# Patient Record
Sex: MALE | Race: WHITE
[De-identification: names, ages, dates, MRNs, and addresses within clinical notes are randomized per-mention and may not be internally consistent; named-entity substitution may affect disease eponyms.]

---

## 2017-01-13 RX ORDER — TEMAZEPAM 30 MG/1
CAPSULE ORAL
Qty: 30 CAPSULE | Refills: 0 | Status: SHIPPED | OUTPATIENT
Start: 2017-01-13 | End: 2017-02-10

## 2017-01-30 ENCOUNTER — PRIOR ORIGINAL RECORDS (OUTPATIENT)
Dept: OTHER | Age: 67
End: 2017-01-30

## 2017-01-31 RX ORDER — GABAPENTIN 100 MG/1
200 CAPSULE ORAL 2 TIMES DAILY
Qty: 120 CAPSULE | Refills: 5 | Status: SHIPPED | OUTPATIENT
Start: 2017-01-31 | End: 2017-08-02

## 2017-02-11 ENCOUNTER — OFFICE VISIT (OUTPATIENT)
Dept: INTERNAL MEDICINE CLINIC | Facility: CLINIC | Age: 67
End: 2017-02-11

## 2017-02-11 VITALS
BODY MASS INDEX: 26.04 KG/M2 | SYSTOLIC BLOOD PRESSURE: 132 MMHG | WEIGHT: 186 LBS | HEART RATE: 56 BPM | DIASTOLIC BLOOD PRESSURE: 76 MMHG | HEIGHT: 71 IN

## 2017-02-11 DIAGNOSIS — E78.00 HYPERCHOLESTEROLEMIA: ICD-10-CM

## 2017-02-11 DIAGNOSIS — I25.10 ASHD (ARTERIOSCLEROTIC HEART DISEASE): Primary | ICD-10-CM

## 2017-02-11 DIAGNOSIS — Z86.010 HISTORY OF COLON POLYPS: ICD-10-CM

## 2017-02-11 DIAGNOSIS — Z13.6 SCREENING FOR ABDOMINAL AORTIC ANEURYSM: ICD-10-CM

## 2017-02-11 DIAGNOSIS — I10 ESSENTIAL HYPERTENSION: ICD-10-CM

## 2017-02-11 DIAGNOSIS — M48.061 LUMBAR SPINAL STENOSIS: ICD-10-CM

## 2017-02-11 PROCEDURE — 99212 OFFICE O/P EST SF 10 MIN: CPT | Performed by: INTERNAL MEDICINE

## 2017-02-11 PROCEDURE — 99213 OFFICE O/P EST LOW 20 MIN: CPT | Performed by: INTERNAL MEDICINE

## 2017-02-11 NOTE — PROGRESS NOTES
Varun Gomez is a 69year old male. Patient presents with:  Low Back Pain: Gabapentin has helped a little bit, but hasn't cured it  HTN: Follow up  Leg Pain: Bump on left leg    HPI:   Mr. Nancy West presents this morning for follow-up.   Labs or daily.   Disp:  Rfl:    Atorvastatin Calcium (LIPITOR) 80 MG Oral Tab Take 80 mg by mouth daily.    Disp:  Rfl:      No Known Allergies   Past Medical History   Diagnosis Date   • ASHD (arteriosclerotic heart disease) 2013     Acute inferolateral MI s/p christal MD  2/11/2017  10:07 AM

## 2017-02-11 NOTE — PATIENT INSTRUCTIONS
Please continue your current medications. Please schedule an ultrasound to check for an abdominal aortic aneurysm. Please contact GI as you are due for repeat colonoscopy. Return visit in 6 months.

## 2017-02-14 ENCOUNTER — PATIENT MESSAGE (OUTPATIENT)
Dept: INTERNAL MEDICINE CLINIC | Facility: CLINIC | Age: 67
End: 2017-02-14

## 2017-02-15 RX ORDER — TEMAZEPAM 30 MG/1
CAPSULE ORAL
Qty: 30 CAPSULE | Refills: 0 | OUTPATIENT
Start: 2017-02-15 | End: 2017-03-15

## 2017-02-15 NOTE — TELEPHONE ENCOUNTER
From: Erick Ocasio  To: Gail Rendon MD  Sent: 2/14/2017 1:15 PM CST  Subject: Prescription Question    Could someone from this office get to the bottom of why no one can call in a script for my 's Temazapam!    He has one left for yovanny

## 2017-02-25 ENCOUNTER — HOSPITAL ENCOUNTER (OUTPATIENT)
Dept: ULTRASOUND IMAGING | Facility: HOSPITAL | Age: 67
Discharge: HOME | End: 2017-02-25
Attending: INTERNAL MEDICINE
Payer: COMMERCIAL

## 2017-02-25 DIAGNOSIS — Z13.6 SCREENING FOR ABDOMINAL AORTIC ANEURYSM: ICD-10-CM

## 2017-02-25 PROCEDURE — 76706 US ABDL AORTA SCREEN AAA: CPT

## 2017-03-15 RX ORDER — TEMAZEPAM 30 MG/1
CAPSULE ORAL
Qty: 30 CAPSULE | Refills: 0 | Status: SHIPPED | OUTPATIENT
Start: 2017-03-15 | End: 2017-04-08

## 2017-03-17 ENCOUNTER — PATIENT MESSAGE (OUTPATIENT)
Dept: INTERNAL MEDICINE CLINIC | Facility: CLINIC | Age: 67
End: 2017-03-17

## 2017-03-18 NOTE — TELEPHONE ENCOUNTER
Patient called to check on medication.    Temazepam called into the [facility] as ordered on 3/15/17 to [facility]

## 2017-03-20 RX ORDER — TEMAZEPAM 30 MG/1
CAPSULE ORAL
Qty: 30 CAPSULE | Refills: 0 | Status: CANCELLED | OUTPATIENT
Start: 2017-03-20

## 2017-03-20 NOTE — TELEPHONE ENCOUNTER
From: Alisa Ocasio  To: Wesley Tam MD  Sent: 3/15/2017 7:30 AM CDT  Subject: Medication Renewal Request    Original authorizing provider: MD Natalio Jacinto would like a refill of the following medications:  TEMAZEPAM

## 2017-03-22 NOTE — TELEPHONE ENCOUNTER
From: Erick Ocasio  To: Gail Rendon MD  Sent: 3/17/2017 6:57 PM CDT  Subject: Prescription Question    I am trying to get my 's Temazapam refilled. The pharmacy had requested it, I called on Thursday and now I am writing again.  He has o

## 2017-04-09 RX ORDER — TEMAZEPAM 30 MG/1
CAPSULE ORAL
Qty: 30 CAPSULE | Refills: 0 | Status: SHIPPED | OUTPATIENT
Start: 2017-04-09 | End: 2017-05-15

## 2017-05-01 ENCOUNTER — PATIENT MESSAGE (OUTPATIENT)
Dept: INTERNAL MEDICINE CLINIC | Facility: CLINIC | Age: 67
End: 2017-05-01

## 2017-05-03 ENCOUNTER — TELEPHONE (OUTPATIENT)
Dept: INTERNAL MEDICINE CLINIC | Facility: CLINIC | Age: 67
End: 2017-05-03

## 2017-05-03 NOTE — TELEPHONE ENCOUNTER
[facility]    Please transfer x [phone] Carlos Holland RN) until 4pm today or [phone] anytime. Thank you. Pt needs further triage. Noted copied from [address] below.     From: Maria L Ocasio  To: Andreas Goyal MD  Sent: 5/1/2017 9:23 AM CDT  Subject: Jaziel Villaseñor

## 2017-05-03 NOTE — TELEPHONE ENCOUNTER
Please see Acute telephone encounter 5/3/17. LMTCB for further triage. Message also sent via [employer] to call the office to speak with a nurse.         From: Cathy Ocasio  To: Tj Trivedi MD  Sent: 5/1/2017 9:23 AM CDT  Subject: Non-Urgent

## 2017-05-04 NOTE — TELEPHONE ENCOUNTER
Pt called, message per Dr given.   Verbalized understanding and compliance  Has appt pending 5/8/17 w/ Taylor Franco

## 2017-05-08 ENCOUNTER — OFFICE VISIT (OUTPATIENT)
Dept: SURGERY | Facility: CLINIC | Age: 67
End: 2017-05-08

## 2017-05-08 VITALS
RESPIRATION RATE: 20 BRPM | HEIGHT: 71 IN | BODY MASS INDEX: 26.18 KG/M2 | DIASTOLIC BLOOD PRESSURE: 82 MMHG | SYSTOLIC BLOOD PRESSURE: 130 MMHG | WEIGHT: 187 LBS | HEART RATE: 68 BPM

## 2017-05-08 DIAGNOSIS — K40.90 RIGHT INGUINAL HERNIA: Primary | ICD-10-CM

## 2017-05-08 PROCEDURE — 99244 OFF/OP CNSLTJ NEW/EST MOD 40: CPT | Performed by: SURGERY

## 2017-05-08 PROCEDURE — 99212 OFFICE O/P EST SF 10 MIN: CPT | Performed by: SURGERY

## 2017-05-08 NOTE — H&P
History and Physical      Varun Gomez is a 69year old male.       HPI   Patient presents with:  Consult: R inguinal hernia; work related injury 4/28    No fam hx hernias, discomfort 4/24/17 while lifting at work, seen by clinic doctorchristiane noti History    Marital Status:              Spouse Name:                       Years of Education:                 Number of children: 3             Occupational History  Occupation          [employer],* hernia. H/o CAD and cardiomyopathy, on ASA and plavix. Discussed in detail with patient and spouse, options reviewed and literature provided. Plan (open) R inguinal hernia repair with mesh - they understand risks.   They may call to schedule after f/u ca

## 2017-05-15 RX ORDER — TEMAZEPAM 30 MG/1
CAPSULE ORAL
Qty: 30 CAPSULE | Refills: 0 | Status: SHIPPED | OUTPATIENT
Start: 2017-05-15 | End: 2017-06-12

## 2017-05-24 ENCOUNTER — PRIOR ORIGINAL RECORDS (OUTPATIENT)
Dept: OTHER | Age: 67
End: 2017-05-24

## 2017-05-24 ENCOUNTER — TELEPHONE (OUTPATIENT)
Dept: SURGERY | Facility: CLINIC | Age: 67
End: 2017-05-24

## 2017-05-24 NOTE — TELEPHONE ENCOUNTER
Pts spouse would like to schedule hernia sx, pt has received clearance from cardiologist. Pls call thank you.

## 2017-05-24 NOTE — TELEPHONE ENCOUNTER
Contacted patient's wife, Violet Faria. Patient's cardiologist, Dr. Kerrie Green cleared patient for hernia surgery.  We have not yet received cardiology note, patient's wife states she will send via Mychart along with EKG or will drop off at office mg  tomorrow, 05/25

## 2017-05-25 ENCOUNTER — TELEPHONE (OUTPATIENT)
Dept: SURGERY | Facility: CLINIC | Age: 67
End: 2017-05-25

## 2017-05-25 NOTE — TELEPHONE ENCOUNTER
[address] [facility] wife Tyrone Torrez [facility]  5/25/17 - the results of his current Cardiology report for dr Colleen Moreno to review prior to patient surgery with General Surgery.

## 2017-05-30 ENCOUNTER — TELEPHONE (OUTPATIENT)
Dept: SURGERY | Facility: CLINIC | Age: 67
End: 2017-05-30

## 2017-05-30 ENCOUNTER — PATIENT MESSAGE (OUTPATIENT)
Dept: INTERNAL MEDICINE CLINIC | Facility: CLINIC | Age: 67
End: 2017-05-30

## 2017-05-30 NOTE — TELEPHONE ENCOUNTER
L/M that his surgery is on June 21st and the note that would come from Dr. Narciso Tracey would be for and time post operatively. Note prior to surgery would be directed to Dr. Sherri Rosado.

## 2017-06-02 ENCOUNTER — TELEPHONE (OUTPATIENT)
Dept: INTERNAL MEDICINE CLINIC | Facility: CLINIC | Age: 67
End: 2017-06-02

## 2017-06-02 NOTE — TELEPHONE ENCOUNTER
CS- please assist as pt wants to  at [facility]-  Thank you      Tiffany Siddiqui MD at 6/2/2017  7:48 AM        Status: Signed         Expand All Collapse All    OK to provide letter as requested                     Steven Marquez RN at 6/1/2017  7:5

## 2017-06-02 NOTE — TELEPHONE ENCOUNTER
From: Jarod Ocasio  To: Mounika Harman MD  Sent: 5/30/2017 3:40 PM CDT  Subject: Non-Urgent Medical Question    Hello! On June 21st. I will be having surgery to repair a hernia, by Dr. Gordon Jimenez.  I need a doctor's letter saying I need to be on ligh

## 2017-06-03 ENCOUNTER — PATIENT MESSAGE (OUTPATIENT)
Dept: INTERNAL MEDICINE CLINIC | Facility: CLINIC | Age: 67
End: 2017-06-03

## 2017-06-03 NOTE — TELEPHONE ENCOUNTER
From: Anaid Ocasio  To: Stephanie Lawrence MD  Sent: 6/3/2017 9:56 AM CDT  Subject: Other    Thank you! We finally got it printed. We're highly technological...not! Have a good one!

## 2017-06-12 RX ORDER — TEMAZEPAM 30 MG/1
CAPSULE ORAL
Qty: 30 CAPSULE | Refills: 0 | Status: SHIPPED | OUTPATIENT
Start: 2017-06-12 | End: 2017-07-16

## 2017-06-14 NOTE — TELEPHONE ENCOUNTER
Rx for TEMAZEPAM 30 MG Oral Cap called in to pharmacy via pharmacist - Alberto Thomason, per [facility]'s order.

## 2017-06-21 ENCOUNTER — ANESTHESIA (OUTPATIENT)
Dept: SURGERY | Facility: HOSPITAL | Age: 67
End: 2017-06-21
Payer: OTHER MISCELLANEOUS

## 2017-06-21 ENCOUNTER — SURGERY (OUTPATIENT)
Age: 67
End: 2017-06-21

## 2017-06-21 ENCOUNTER — HOSPITAL ENCOUNTER (OUTPATIENT)
Facility: HOSPITAL | Age: 67
Setting detail: HOSPITAL OUTPATIENT SURGERY
Discharge: HOME | End: 2017-06-21
Attending: SURGERY | Admitting: SURGERY
Payer: OTHER MISCELLANEOUS

## 2017-06-21 ENCOUNTER — ANESTHESIA EVENT (OUTPATIENT)
Dept: SURGERY | Facility: HOSPITAL | Age: 67
End: 2017-06-21
Payer: OTHER MISCELLANEOUS

## 2017-06-21 VITALS
OXYGEN SATURATION: 96 % | TEMPERATURE: 98 F | RESPIRATION RATE: 14 BRPM | SYSTOLIC BLOOD PRESSURE: 116 MMHG | HEART RATE: 62 BPM | BODY MASS INDEX: 26.06 KG/M2 | WEIGHT: 186.19 LBS | DIASTOLIC BLOOD PRESSURE: 68 MMHG | HEIGHT: 71 IN

## 2017-06-21 DIAGNOSIS — K40.90 RIGHT INGUINAL HERNIA: Primary | ICD-10-CM

## 2017-06-21 PROCEDURE — 49505 PRP I/HERN INIT REDUC >5 YR: CPT | Performed by: SURGERY

## 2017-06-21 PROCEDURE — 0YU50JZ SUPPLEMENT RIGHT INGUINAL REGION WITH SYNTHETIC SUBSTITUTE, OPEN APPROACH: ICD-10-PCS | Performed by: SURGERY

## 2017-06-21 DEVICE — POLYPROPLYLENE NONABSORBABLE SYNTHETIC SURGICAL MESH
Type: IMPLANTABLE DEVICE | Site: INGUINAL | Status: FUNCTIONAL
Brand: PROLENE

## 2017-06-21 RX ORDER — ONDANSETRON 2 MG/ML
4 INJECTION INTRAMUSCULAR; INTRAVENOUS ONCE AS NEEDED
Status: DISCONTINUED | OUTPATIENT
Start: 2017-06-21 | End: 2017-06-21

## 2017-06-21 RX ORDER — HYDROMORPHONE HYDROCHLORIDE 1 MG/ML
0.4 INJECTION, SOLUTION INTRAMUSCULAR; INTRAVENOUS; SUBCUTANEOUS EVERY 5 MIN PRN
Status: DISCONTINUED | OUTPATIENT
Start: 2017-06-21 | End: 2017-06-21

## 2017-06-21 RX ORDER — SODIUM CHLORIDE, SODIUM LACTATE, POTASSIUM CHLORIDE, CALCIUM CHLORIDE 600; 310; 30; 20 MG/100ML; MG/100ML; MG/100ML; MG/100ML
INJECTION, SOLUTION INTRAVENOUS CONTINUOUS
Status: DISCONTINUED | OUTPATIENT
Start: 2017-06-21 | End: 2017-06-21

## 2017-06-21 RX ORDER — HYDROMORPHONE HYDROCHLORIDE 1 MG/ML
0.2 INJECTION, SOLUTION INTRAMUSCULAR; INTRAVENOUS; SUBCUTANEOUS EVERY 5 MIN PRN
Status: DISCONTINUED | OUTPATIENT
Start: 2017-06-21 | End: 2017-06-21

## 2017-06-21 RX ORDER — METOCLOPRAMIDE 10 MG/1
10 TABLET ORAL ONCE
Status: DISCONTINUED | OUTPATIENT
Start: 2017-06-21 | End: 2017-06-21 | Stop reason: HOSPADM

## 2017-06-21 RX ORDER — HYDROMORPHONE HYDROCHLORIDE 1 MG/ML
0.6 INJECTION, SOLUTION INTRAMUSCULAR; INTRAVENOUS; SUBCUTANEOUS EVERY 5 MIN PRN
Status: DISCONTINUED | OUTPATIENT
Start: 2017-06-21 | End: 2017-06-21

## 2017-06-21 RX ORDER — MIDAZOLAM HYDROCHLORIDE 1 MG/ML
INJECTION INTRAMUSCULAR; INTRAVENOUS AS NEEDED
Status: DISCONTINUED | OUTPATIENT
Start: 2017-06-21 | End: 2017-06-21 | Stop reason: SURG

## 2017-06-21 RX ORDER — SUCCINYLCHOLINE CHLORIDE 20 MG/ML
INJECTION INTRAMUSCULAR; INTRAVENOUS AS NEEDED
Status: DISCONTINUED | OUTPATIENT
Start: 2017-06-21 | End: 2017-06-21 | Stop reason: SURG

## 2017-06-21 RX ORDER — MORPHINE SULFATE 2 MG/ML
2 INJECTION, SOLUTION INTRAMUSCULAR; INTRAVENOUS EVERY 10 MIN PRN
Status: DISCONTINUED | OUTPATIENT
Start: 2017-06-21 | End: 2017-06-21

## 2017-06-21 RX ORDER — MORPHINE SULFATE 4 MG/ML
4 INJECTION, SOLUTION INTRAMUSCULAR; INTRAVENOUS EVERY 10 MIN PRN
Status: DISCONTINUED | OUTPATIENT
Start: 2017-06-21 | End: 2017-06-21

## 2017-06-21 RX ORDER — HYDROCODONE BITARTRATE AND ACETAMINOPHEN 5; 325 MG/1; MG/1
2 TABLET ORAL AS NEEDED
Status: DISCONTINUED | OUTPATIENT
Start: 2017-06-21 | End: 2017-06-21

## 2017-06-21 RX ORDER — NALOXONE HYDROCHLORIDE 0.4 MG/ML
80 INJECTION, SOLUTION INTRAMUSCULAR; INTRAVENOUS; SUBCUTANEOUS AS NEEDED
Status: DISCONTINUED | OUTPATIENT
Start: 2017-06-21 | End: 2017-06-21

## 2017-06-21 RX ORDER — ACETAMINOPHEN 325 MG/1
650 TABLET ORAL ONCE
Status: COMPLETED | OUTPATIENT
Start: 2017-06-21 | End: 2017-06-21

## 2017-06-21 RX ORDER — EPHEDRINE SULFATE 50 MG/ML
INJECTION, SOLUTION INTRAVENOUS AS NEEDED
Status: DISCONTINUED | OUTPATIENT
Start: 2017-06-21 | End: 2017-06-21 | Stop reason: SURG

## 2017-06-21 RX ORDER — HALOPERIDOL 5 MG/ML
0.25 INJECTION INTRAMUSCULAR ONCE AS NEEDED
Status: DISCONTINUED | OUTPATIENT
Start: 2017-06-21 | End: 2017-06-21

## 2017-06-21 RX ORDER — ACETAMINOPHEN 500 MG
500 TABLET ORAL EVERY 6 HOURS PRN
COMMUNITY

## 2017-06-21 RX ORDER — HYDROCODONE BITARTRATE AND ACETAMINOPHEN 5; 325 MG/1; MG/1
1 TABLET ORAL EVERY 6 HOURS PRN
Qty: 30 TABLET | Refills: 0 | Status: SHIPPED | OUTPATIENT
Start: 2017-06-21 | End: 2017-08-14

## 2017-06-21 RX ORDER — METOPROLOL TARTRATE 5 MG/5ML
2.5 INJECTION INTRAVENOUS ONCE
Status: DISCONTINUED | OUTPATIENT
Start: 2017-06-21 | End: 2017-06-21

## 2017-06-21 RX ORDER — HYDROCODONE BITARTRATE AND ACETAMINOPHEN 5; 325 MG/1; MG/1
1 TABLET ORAL AS NEEDED
Status: DISCONTINUED | OUTPATIENT
Start: 2017-06-21 | End: 2017-06-21

## 2017-06-21 RX ORDER — ONDANSETRON 2 MG/ML
INJECTION INTRAMUSCULAR; INTRAVENOUS AS NEEDED
Status: DISCONTINUED | OUTPATIENT
Start: 2017-06-21 | End: 2017-06-21 | Stop reason: SURG

## 2017-06-21 RX ORDER — DEXAMETHASONE SODIUM PHOSPHATE 4 MG/ML
VIAL (ML) INJECTION AS NEEDED
Status: DISCONTINUED | OUTPATIENT
Start: 2017-06-21 | End: 2017-06-21 | Stop reason: SURG

## 2017-06-21 RX ORDER — LIDOCAINE HYDROCHLORIDE 10 MG/ML
INJECTION, SOLUTION EPIDURAL; INFILTRATION; INTRACAUDAL; PERINEURAL AS NEEDED
Status: DISCONTINUED | OUTPATIENT
Start: 2017-06-21 | End: 2017-06-21 | Stop reason: SURG

## 2017-06-21 RX ORDER — MORPHINE SULFATE 10 MG/ML
6 INJECTION, SOLUTION INTRAMUSCULAR; INTRAVENOUS EVERY 10 MIN PRN
Status: DISCONTINUED | OUTPATIENT
Start: 2017-06-21 | End: 2017-06-21

## 2017-06-21 RX ORDER — FAMOTIDINE 20 MG/1
20 TABLET ORAL ONCE
Status: DISCONTINUED | OUTPATIENT
Start: 2017-06-21 | End: 2017-06-21 | Stop reason: HOSPADM

## 2017-06-21 RX ORDER — BUPIVACAINE HYDROCHLORIDE AND EPINEPHRINE 5; 5 MG/ML; UG/ML
INJECTION, SOLUTION PERINEURAL AS NEEDED
Status: DISCONTINUED | OUTPATIENT
Start: 2017-06-21 | End: 2017-06-21 | Stop reason: HOSPADM

## 2017-06-21 RX ADMIN — EPHEDRINE SULFATE 10 MG: 50 INJECTION, SOLUTION INTRAVENOUS at 07:37:00

## 2017-06-21 RX ADMIN — SODIUM CHLORIDE, SODIUM LACTATE, POTASSIUM CHLORIDE, CALCIUM CHLORIDE: 600; 310; 30; 20 INJECTION, SOLUTION INTRAVENOUS at 08:49:00

## 2017-06-21 RX ADMIN — SODIUM CHLORIDE, SODIUM LACTATE, POTASSIUM CHLORIDE, CALCIUM CHLORIDE: 600; 310; 30; 20 INJECTION, SOLUTION INTRAVENOUS at 07:25:00

## 2017-06-21 RX ADMIN — DEXAMETHASONE SODIUM PHOSPHATE 4 MG: 4 MG/ML VIAL (ML) INJECTION at 07:50:00

## 2017-06-21 RX ADMIN — LIDOCAINE HYDROCHLORIDE 50 MG: 10 INJECTION, SOLUTION EPIDURAL; INFILTRATION; INTRACAUDAL; PERINEURAL at 07:32:00

## 2017-06-21 RX ADMIN — EPHEDRINE SULFATE 10 MG: 50 INJECTION, SOLUTION INTRAVENOUS at 08:11:00

## 2017-06-21 RX ADMIN — SUCCINYLCHOLINE CHLORIDE 120 MG: 20 INJECTION INTRAMUSCULAR; INTRAVENOUS at 07:32:00

## 2017-06-21 RX ADMIN — ONDANSETRON 4 MG: 2 INJECTION INTRAMUSCULAR; INTRAVENOUS at 07:50:00

## 2017-06-21 RX ADMIN — EPHEDRINE SULFATE 10 MG: 50 INJECTION, SOLUTION INTRAVENOUS at 08:25:00

## 2017-06-21 RX ADMIN — MIDAZOLAM HYDROCHLORIDE 2 MG: 1 INJECTION INTRAMUSCULAR; INTRAVENOUS at 07:32:00

## 2017-06-21 NOTE — ANESTHESIA PREPROCEDURE EVALUATION
Anesthesia PreOp Note    HPI:     Angella Aguilar is a 69year old male who presents for preoperative consultation requested by: Buzz Matos MD    Date of Surgery: 6/21/2017    Procedure(s):   HERNIA INGUINAL REPAIR ADULT  Indication: Right inguina times daily. Disp: 120 capsule Rfl: 5 6/21/2017 at 0400   hydrochlorothiazide 25 MG Oral Tab Take 1 tablet by mouth daily. Disp:  Rfl: 3 6/20/2017 at 0800   Clopidogrel Bisulfate 75 MG Oral Tab Take 75 mg by mouth every other day.  Disp:  Rfl: 3 6/17/2017 Not on file  Other Topics Concern    Caffeine Concern Yes    Comment: Coffee, 4 cups daily; Social History Narrative       Available pre-op labs reviewed. Vital Signs: Body mass index is 25.98 kg/(m^2).    height is 1.803 m (5' 11\") and

## 2017-06-21 NOTE — OPERATIVE REPORT
[facility]    PATIENT'S NAME: Brunilda Luque   ATTENDING PHYSICIAN: Brent Hartman MD   OPERATING PHYSICIAN: Brent Hartman MD   PATIENT ACCOUNT#:   [id number]    LOCATION:  [facility] [address] PACU [facility] 10  MEDICAL RECORD #:   [id number] placed into this space and the posterior disc deployed. Posterior wall fibers were then re-secured around the connector using 2-0 Vicryl suture. The onlay mesh was secured across the entire inguinal floor using interrupted 0 Vicryl suture.   Good coverage

## 2017-06-21 NOTE — BRIEF OP NOTE
Pre-Operative Diagnosis: Right inguinal hernia      Post-Operative Diagnosis: Right inguinal hernia      Procedure Performed:   Procedure(s):  Right inguinal hernia repair with mesh     Surgeon(s) and Role:     Giovanna Stafford MD - Primary    Assistant

## 2017-06-21 NOTE — H&P
Expand All Collapse All    History and Physical      Milla Mejia is a 69year old male.        HPI    Patient presents with:  Consult: R inguinal hernia; work related injury 4/28    No fam hx hernias, discomfort 4/24/17 while lifting at work, se Take 80 mg by mouth daily.    Disp:   Rfl:         ALLERGIES  No Known Allergies    SOCIAL HISTORY  Social History    Marital Status:              Spouse Name:                        Years of [place] Number of children: 2         age       DIAGNOSTICS        ASSESSMENT/PLAN    Assessment  Right inguinal hernia  (primary encounter diagnosis)    68 y/o male with a mildly symptomatic R inguinal hernia.  H/o CAD and cardiomyopathy, on ASA and plavix.   Discussed in detail with patient a

## 2017-06-21 NOTE — ANESTHESIA POSTPROCEDURE EVALUATION
Patient: Henry Matta    Procedure Summary     Date Anesthesia Start Anesthesia Stop Room / Location    06/21/17 0725 0849 [facility] MAIN OR 02 / 300 [facility] MAIN OR       Procedure Diagnosis Surgeon Responsible Provider    HERNIA INGUINAL REPAIR ADULT (Right )

## 2017-06-21 NOTE — INTERVAL H&P NOTE
Pre-op Diagnosis: Right inguinal hernia     The above referenced H&P was reviewed by Gissel Jackson MD on 6/21/2017, the patient was examined and no significant changes have occurred in the patient's condition since the H&P was performed.   I discussed with

## 2017-07-06 ENCOUNTER — OFFICE VISIT (OUTPATIENT)
Dept: SURGERY | Facility: CLINIC | Age: 67
End: 2017-07-06

## 2017-07-06 DIAGNOSIS — K40.90 RIGHT INGUINAL HERNIA: Primary | ICD-10-CM

## 2017-07-06 PROCEDURE — 99024 POSTOP FOLLOW-UP VISIT: CPT | Performed by: SURGERY

## 2017-07-06 PROCEDURE — 99212 OFFICE O/P EST SF 10 MIN: CPT | Performed by: SURGERY

## 2017-07-06 NOTE — PROGRESS NOTES
Postoperative Patient Follow-up      7/6/2017    Gloria Ocasio 69year old      HPI  Patient presents with:  Post-Op: First post op. S/P Right inguinal hernia repair with mesh on 06/21/2017.  Patient experiencing minimal pain currently, states pain

## 2017-07-18 RX ORDER — TEMAZEPAM 30 MG/1
30 CAPSULE ORAL NIGHTLY PRN
Qty: 30 CAPSULE | Refills: 0 | OUTPATIENT
Start: 2017-07-18 | End: 2017-08-14

## 2017-07-18 NOTE — TELEPHONE ENCOUNTER
Pt wife is calling back state that pt is unable to sleep really need prescription filled ASAP please

## 2017-07-19 NOTE — ANESTHESIA POSTPROCEDURE EVALUATION
Patient: Millicent Hobbs    Procedure Summary     Date:  06/21/17 Room / Location:  [address] [place] [address] MAIN OR 02 / 300 [facility] MAIN OR    Anesthesia Start:  [place] Anesthesia Stop:  5670    Procedure:   HERNIA INGUINAL REPAIR ADULT (Right ) Diagnosis:  (Right inguinal hernia

## 2017-07-19 NOTE — TELEPHONE ENCOUNTER
Contacted [facility] pharmacist in [facility] and stated tempazepam 30 mg was already called in by another clinical staff.

## 2017-07-27 ENCOUNTER — TELEPHONE (OUTPATIENT)
Dept: SURGERY | Facility: CLINIC | Age: 67
End: 2017-07-27

## 2017-08-01 ENCOUNTER — OFFICE VISIT (OUTPATIENT)
Dept: SURGERY | Facility: CLINIC | Age: 67
End: 2017-08-01

## 2017-08-01 VITALS — WEIGHT: 186 LBS | HEIGHT: 71 IN | BODY MASS INDEX: 26.04 KG/M2

## 2017-08-01 DIAGNOSIS — K40.90 RIGHT INGUINAL HERNIA: Primary | ICD-10-CM

## 2017-08-01 PROCEDURE — 99024 POSTOP FOLLOW-UP VISIT: CPT | Performed by: SURGERY

## 2017-08-01 PROCEDURE — 99212 OFFICE O/P EST SF 10 MIN: CPT | Performed by: SURGERY

## 2017-08-02 NOTE — PROGRESS NOTES
Postoperative Patient Follow-up      8/1/2017    Geraldo Ocasio 69year old      HPI  Patient presents with:  Post-Op: second post OP. S/p right inguinal hernia repair with mesh on 6/21/17.  Pt experiencing minimal pain, states increases throughout t

## 2017-08-03 RX ORDER — GABAPENTIN 100 MG/1
CAPSULE ORAL
Qty: 120 CAPSULE | Refills: 5 | Status: SHIPPED | OUTPATIENT
Start: 2017-08-03 | End: 2018-01-18

## 2017-08-14 ENCOUNTER — APPOINTMENT (OUTPATIENT)
Dept: LAB | Facility: HOSPITAL | Age: 67
End: 2017-08-14
Attending: INTERNAL MEDICINE
Payer: COMMERCIAL

## 2017-08-14 ENCOUNTER — OFFICE VISIT (OUTPATIENT)
Dept: INTERNAL MEDICINE CLINIC | Facility: CLINIC | Age: 67
End: 2017-08-14

## 2017-08-14 ENCOUNTER — TELEPHONE (OUTPATIENT)
Dept: INTERNAL MEDICINE CLINIC | Facility: CLINIC | Age: 67
End: 2017-08-14

## 2017-08-14 VITALS
WEIGHT: 185 LBS | DIASTOLIC BLOOD PRESSURE: 72 MMHG | BODY MASS INDEX: 25.9 KG/M2 | HEART RATE: 56 BPM | HEIGHT: 71 IN | SYSTOLIC BLOOD PRESSURE: 136 MMHG

## 2017-08-14 DIAGNOSIS — N50.89 SWELLING OF RIGHT TESTICLE: ICD-10-CM

## 2017-08-14 DIAGNOSIS — N50.89 SWELLING OF RIGHT TESTICLE: Primary | ICD-10-CM

## 2017-08-14 PROCEDURE — 99212 OFFICE O/P EST SF 10 MIN: CPT | Performed by: INTERNAL MEDICINE

## 2017-08-14 PROCEDURE — 99213 OFFICE O/P EST LOW 20 MIN: CPT | Performed by: INTERNAL MEDICINE

## 2017-08-14 RX ORDER — TEMAZEPAM 30 MG/1
30 CAPSULE ORAL NIGHTLY PRN
Qty: 30 CAPSULE | Refills: 0 | Status: SHIPPED | OUTPATIENT
Start: 2017-08-14 | End: 2017-09-16

## 2017-08-14 NOTE — PROGRESS NOTES
Carol Estes is a 69year old male. Patient presents with:  Swelling: Pt c/o swelling in his right testicle     HPI:   He underwent mesh repair of a right inguinal hernia in June.   Since that time, he has had persistent swelling of his right testi status: Former Smoker                                                              Packs/day: 1.00      Years: 50.00        Types: Cigarettes     Quit date: 9/3/2013  Alcohol use: Yes           0.0 oz/week     Comment: 3 beers weekly       EXAM:   GENERAL:

## 2017-08-15 ENCOUNTER — LAB ENCOUNTER (OUTPATIENT)
Dept: LAB | Facility: HOSPITAL | Age: 67
End: 2017-08-15
Attending: INTERNAL MEDICINE
Payer: COMMERCIAL

## 2017-08-15 DIAGNOSIS — N50.89 CHYLOCELE OF TUNICA VAGINALIS: Primary | ICD-10-CM

## 2017-08-15 LAB
BACTERIA UR QL AUTO: NEGATIVE /HPF
BILIRUB UR QL: NEGATIVE
CLARITY UR: CLEAR
COLOR UR: YELLOW
GLUCOSE UR-MCNC: NEGATIVE MG/DL
HGB UR QL STRIP.AUTO: NEGATIVE
KETONES UR-MCNC: NEGATIVE MG/DL
NITRITE UR QL STRIP.AUTO: NEGATIVE
PH UR: 5 [PH] (ref 5–8)
PROT UR-MCNC: NEGATIVE MG/DL
RBC #/AREA URNS AUTO: 1 /HPF
SP GR UR STRIP: 1.02 (ref 1–1.03)
UROBILINOGEN UR STRIP-ACNC: <2
VIT C UR-MCNC: NEGATIVE MG/DL
WBC #/AREA URNS AUTO: 6 /HPF

## 2017-08-15 PROCEDURE — 81001 URINALYSIS AUTO W/SCOPE: CPT

## 2017-08-15 PROCEDURE — 87086 URINE CULTURE/COLONY COUNT: CPT

## 2017-08-16 ENCOUNTER — HOSPITAL ENCOUNTER (OUTPATIENT)
Dept: ULTRASOUND IMAGING | Facility: HOSPITAL | Age: 67
Discharge: HOME | End: 2017-08-16
Attending: INTERNAL MEDICINE
Payer: COMMERCIAL

## 2017-08-16 DIAGNOSIS — N50.89 SWELLING OF RIGHT TESTICLE: ICD-10-CM

## 2017-08-16 PROCEDURE — 76870 US EXAM SCROTUM: CPT | Performed by: INTERNAL MEDICINE

## 2017-08-16 PROCEDURE — 93975 VASCULAR STUDY: CPT | Performed by: INTERNAL MEDICINE

## 2017-09-09 ENCOUNTER — APPOINTMENT (OUTPATIENT)
Dept: LAB | Facility: HOSPITAL | Age: 67
End: 2017-09-09
Attending: INTERNAL MEDICINE
Payer: COMMERCIAL

## 2017-09-09 ENCOUNTER — PRIOR ORIGINAL RECORDS (OUTPATIENT)
Dept: OTHER | Age: 67
End: 2017-09-09

## 2017-09-09 DIAGNOSIS — I10 ESSENTIAL HYPERTENSION: ICD-10-CM

## 2017-09-09 DIAGNOSIS — E78.00 HYPERCHOLESTEROLEMIA: ICD-10-CM

## 2017-09-09 LAB
ALT SERPL-CCNC: 19 U/L (ref 17–63)
ANION GAP SERPL CALC-SCNC: 7 MMOL/L (ref 0–18)
AST SERPL-CCNC: 26 U/L (ref 15–41)
BUN SERPL-MCNC: 19 MG/DL (ref 8–20)
BUN/CREAT SERPL: 17.4 (ref 10–20)
CALCIUM SERPL-MCNC: 9.2 MG/DL (ref 8.5–10.5)
CHLORIDE SERPL-SCNC: 105 MMOL/L (ref 95–110)
CHOLEST SERPL-MCNC: 119 MG/DL (ref 110–200)
CO2 SERPL-SCNC: 28 MMOL/L (ref 22–32)
CREAT SERPL-MCNC: 1.09 MG/DL (ref 0.5–1.5)
GLUCOSE SERPL-MCNC: 98 MG/DL (ref 70–99)
HDLC SERPL-MCNC: 33 MG/DL
LDLC SERPL CALC-MCNC: 65 MG/DL (ref 0–99)
NONHDLC SERPL-MCNC: 86 MG/DL
OSMOLALITY UR CALC.SUM OF ELEC: 292 MOSM/KG (ref 275–295)
POTASSIUM SERPL-SCNC: 4.1 MMOL/L (ref 3.3–5.1)
SODIUM SERPL-SCNC: 140 MMOL/L (ref 136–144)
TRIGL SERPL-MCNC: 107 MG/DL (ref 1–149)

## 2017-09-09 PROCEDURE — 36415 COLL VENOUS BLD VENIPUNCTURE: CPT

## 2017-09-09 PROCEDURE — 84460 ALANINE AMINO (ALT) (SGPT): CPT

## 2017-09-09 PROCEDURE — 84450 TRANSFERASE (AST) (SGOT): CPT

## 2017-09-09 PROCEDURE — 80048 BASIC METABOLIC PNL TOTAL CA: CPT

## 2017-09-09 PROCEDURE — 80061 LIPID PANEL: CPT

## 2017-09-13 ENCOUNTER — PATIENT MESSAGE (OUTPATIENT)
Dept: INTERNAL MEDICINE CLINIC | Facility: CLINIC | Age: 67
End: 2017-09-13

## 2017-09-13 RX ORDER — TEMAZEPAM 30 MG/1
30 CAPSULE ORAL NIGHTLY PRN
Qty: 30 CAPSULE | Refills: 0
Start: 2017-09-13

## 2017-09-14 NOTE — TELEPHONE ENCOUNTER
From: Derick Ocasio  To: Reyna Jacobs MD  Sent: 9/13/2017 3:19 PM CDT  Subject: Other    Could you ask Dr. Radha Olsen if he can submit whatever information is needed for me to get a handicap license [place].  I was told I could get one because of my

## 2017-09-14 NOTE — TELEPHONE ENCOUNTER
Recommend he obtain a handicapped parking form from the [place] and schedule an appointment with me to discuss

## 2017-09-15 ENCOUNTER — PATIENT MESSAGE (OUTPATIENT)
Dept: INTERNAL MEDICINE CLINIC | Facility: CLINIC | Age: 67
End: 2017-09-15

## 2017-09-15 RX ORDER — TEMAZEPAM 30 MG/1
30 CAPSULE ORAL NIGHTLY PRN
Qty: 30 CAPSULE | Refills: 0 | Status: CANCELLED
Start: 2017-09-15

## 2017-09-16 NOTE — TELEPHONE ENCOUNTER
Routed to Dr Kai Goldstein for advise, thanks.    8-14-17 # 30        Refill Protocol Appointment Criteria  · Appointment scheduled in the past 6 months or in the next 3 months  Recent Outpatient Visits            1 month ago Swelling of right testicle    Sunny

## 2017-09-16 NOTE — TELEPHONE ENCOUNTER
From: Gloria Ocasio  To: Krys Aparicio MD  Sent: 9/15/2017 8:03 PM CDT  Subject: Prescription Question    I don't understand why every month I have make so many calls, my chart requests, pharmacy calls to get this prescription filled.  Last month

## 2017-09-17 RX ORDER — TEMAZEPAM 30 MG/1
30 CAPSULE ORAL NIGHTLY PRN
Qty: 30 CAPSULE | Refills: 5 | OUTPATIENT
Start: 2017-09-17 | End: 2018-04-10

## 2017-09-30 ENCOUNTER — OFFICE VISIT (OUTPATIENT)
Dept: INTERNAL MEDICINE CLINIC | Facility: CLINIC | Age: 67
End: 2017-09-30

## 2017-09-30 VITALS
HEIGHT: 71 IN | HEART RATE: 59 BPM | BODY MASS INDEX: 26.32 KG/M2 | WEIGHT: 188 LBS | SYSTOLIC BLOOD PRESSURE: 134 MMHG | DIASTOLIC BLOOD PRESSURE: 76 MMHG

## 2017-09-30 DIAGNOSIS — M54.50 ACUTE BILATERAL LOW BACK PAIN WITHOUT SCIATICA: Primary | ICD-10-CM

## 2017-09-30 PROCEDURE — 99212 OFFICE O/P EST SF 10 MIN: CPT | Performed by: INTERNAL MEDICINE

## 2017-09-30 PROCEDURE — 99213 OFFICE O/P EST LOW 20 MIN: CPT | Performed by: INTERNAL MEDICINE

## 2017-09-30 RX ORDER — CYCLOBENZAPRINE HCL 10 MG
10 TABLET ORAL 3 TIMES DAILY PRN
Qty: 30 TABLET | Refills: 0 | Status: SHIPPED | OUTPATIENT
Start: 2017-09-30 | End: 2017-10-20

## 2017-09-30 RX ORDER — NAPROXEN 500 MG/1
500 TABLET ORAL 2 TIMES DAILY WITH MEALS
Qty: 30 TABLET | Refills: 0 | Status: SHIPPED | OUTPATIENT
Start: 2017-09-30 | End: 2017-10-10 | Stop reason: ALTCHOICE

## 2017-09-30 NOTE — PATIENT INSTRUCTIONS
Please rest your back and apply heat 3-4 times daily. Please take naproxen 500 mg twice daily with meals. Please take cyclobenzaprine 10 mg 3 times daily as needed, watching for drowsiness. Call if no better. Please schedule a physical for November.

## 2017-09-30 NOTE — PROGRESS NOTES
Marianna Baca is a 69year old male. Patient presents with:  Low Back Pain    HPI:   For the past week he has had persistent pain in his lower back bilaterally. He recalls no recent injury or unusual activity.   Pain is worse with movement such as (benign prostatic hyperplasia)    • Erectile dysfunction    • Essential hypertension    • History of colon polyps 4/2005   • Hypercholesterolemia    • Ischemic cardiomyopathy     EF 45-50% on 9/2015   • Lumbar spinal stenosis 9/2016   • Osteoarthritis

## 2017-10-04 ENCOUNTER — TELEPHONE (OUTPATIENT)
Dept: OTHER | Age: 67
End: 2017-10-04

## 2017-10-04 ENCOUNTER — HOSPITAL ENCOUNTER (EMERGENCY)
Facility: HOSPITAL | Age: 67
Discharge: HOME | End: 2017-10-04
Attending: EMERGENCY MEDICINE
Payer: COMMERCIAL

## 2017-10-04 ENCOUNTER — APPOINTMENT (OUTPATIENT)
Dept: GENERAL RADIOLOGY | Facility: HOSPITAL | Age: 67
End: 2017-10-04
Attending: EMERGENCY MEDICINE
Payer: COMMERCIAL

## 2017-10-04 VITALS
OXYGEN SATURATION: 96 % | BODY MASS INDEX: 25.2 KG/M2 | RESPIRATION RATE: 18 BRPM | SYSTOLIC BLOOD PRESSURE: 139 MMHG | HEIGHT: 71 IN | HEART RATE: 50 BPM | DIASTOLIC BLOOD PRESSURE: 84 MMHG | WEIGHT: 180 LBS

## 2017-10-04 DIAGNOSIS — M54.41 ACUTE BILATERAL LOW BACK PAIN WITH RIGHT-SIDED SCIATICA: Primary | ICD-10-CM

## 2017-10-04 PROCEDURE — 96374 THER/PROPH/DIAG INJ IV PUSH: CPT

## 2017-10-04 PROCEDURE — 96375 TX/PRO/DX INJ NEW DRUG ADDON: CPT

## 2017-10-04 PROCEDURE — 99284 EMERGENCY DEPT VISIT MOD MDM: CPT

## 2017-10-04 PROCEDURE — 96376 TX/PRO/DX INJ SAME DRUG ADON: CPT

## 2017-10-04 PROCEDURE — 72100 X-RAY EXAM L-S SPINE 2/3 VWS: CPT | Performed by: EMERGENCY MEDICINE

## 2017-10-04 RX ORDER — MORPHINE SULFATE 4 MG/ML
4 INJECTION, SOLUTION INTRAMUSCULAR; INTRAVENOUS ONCE
Status: COMPLETED | OUTPATIENT
Start: 2017-10-04 | End: 2017-10-04

## 2017-10-04 RX ORDER — HYDROCODONE BITARTRATE AND ACETAMINOPHEN 5; 325 MG/1; MG/1
1-2 TABLET ORAL EVERY 4 HOURS PRN
Qty: 20 TABLET | Refills: 0 | Status: SHIPPED | OUTPATIENT
Start: 2017-10-04 | End: 2017-10-10

## 2017-10-04 RX ORDER — KETOROLAC TROMETHAMINE 15 MG/ML
15 INJECTION, SOLUTION INTRAMUSCULAR; INTRAVENOUS ONCE
Status: COMPLETED | OUTPATIENT
Start: 2017-10-04 | End: 2017-10-04

## 2017-10-04 NOTE — ED PROVIDER NOTES
Patient Seen in: [facility] AND [facility] Emergency Department    History   Patient presents with:  Back Pain (musculoskeletal)    Stated Complaint: Back Pain    HPI    20-year-old male with history of  coronary artery disease post myocardial infarction and mul Packs/day: 1.00      Years: 50.00        Types: Cigarettes     Quit date: 9/3/2013  Smokeless tobacco: Never Used                      Alcohol use: Yes           0.0 oz/week     Comment: 3 maidas weekly      Review of Systems    Positiv tract infection        ED Course   Labs Reviewed - No data to display    ============================================================  ED Course  ------------------------------------------------------------  MDM     X-ray results noted.   Patient reports so

## 2017-10-04 NOTE — TELEPHONE ENCOUNTER
Spouse making F/U call. Pt prescribed naproxen and cyclobenzaprine on 9/30/17, taking as directed and feeling no relief. Sciatic pain score 10/10 cannot walk. Advised spouse when pain score is 10/10 needs immediate eval in ED.   Verbalized understanding

## 2017-10-04 NOTE — ED INITIAL ASSESSMENT (HPI)
C/O low back pain for a few weeks now. Onset 1ye ago. No relief with pain meds.  Sent to ED by his PMD

## 2017-10-06 ENCOUNTER — PATIENT MESSAGE (OUTPATIENT)
Dept: INTERNAL MEDICINE CLINIC | Facility: CLINIC | Age: 67
End: 2017-10-06

## 2017-10-10 ENCOUNTER — OFFICE VISIT (OUTPATIENT)
Dept: INTERNAL MEDICINE CLINIC | Facility: CLINIC | Age: 67
End: 2017-10-10

## 2017-10-10 VITALS
WEIGHT: 191 LBS | SYSTOLIC BLOOD PRESSURE: 144 MMHG | HEIGHT: 71 IN | DIASTOLIC BLOOD PRESSURE: 82 MMHG | HEART RATE: 59 BPM | BODY MASS INDEX: 26.74 KG/M2

## 2017-10-10 DIAGNOSIS — M54.50 ACUTE BILATERAL LOW BACK PAIN WITHOUT SCIATICA: Primary | ICD-10-CM

## 2017-10-10 PROCEDURE — 99212 OFFICE O/P EST SF 10 MIN: CPT | Performed by: INTERNAL MEDICINE

## 2017-10-10 PROCEDURE — 99213 OFFICE O/P EST LOW 20 MIN: CPT | Performed by: INTERNAL MEDICINE

## 2017-10-10 RX ORDER — CYCLOBENZAPRINE HCL 10 MG
10 TABLET ORAL 3 TIMES DAILY PRN
Qty: 30 TABLET | Refills: 0 | Status: SHIPPED | OUTPATIENT
Start: 2017-10-10 | End: 2017-10-31

## 2017-10-10 RX ORDER — NABUMETONE 750 MG/1
750 TABLET, FILM COATED ORAL 2 TIMES DAILY WITH MEALS
Qty: 30 TABLET | Refills: 0 | Status: SHIPPED | OUTPATIENT
Start: 2017-10-10 | End: 2017-11-13

## 2017-10-10 NOTE — PROGRESS NOTES
Moira Schwarz is a 69year old male. Patient presents with:  ER F/U: Pt stts he was seen at the ER on 10/4 due to back pain    HPI:   Mr. Rene Bustamante presents this morning for ER follow-up.     After his last visit with me September 30, low back pa Tablet 24 Hr Take 25 mg by mouth daily. Disp:  Rfl: 3   Cholecalciferol (VITAMIN D) 1000 UNITS Oral Cap Take 1 capsule by mouth daily. Disp:  Rfl:    Atorvastatin Calcium (LIPITOR) 80 MG Oral Tab Take 80 mg by mouth nightly.    Disp:  Rfl:      No Known better. Note provided for his employer requesting he lift nothing heavier than 10 pounds. Physical in November. The patient indicates understanding of these issues and agrees to the plan.     Maribel Rodgers MD  10/10/2017  10:29 AM

## 2017-10-10 NOTE — PATIENT INSTRUCTIONS
Continue to rest your back and apply heat 3 times daily. Please take nabumetone 750 mg twice daily with meals. Please take cyclobenzaprine 10 mg 3 times daily as needed, watching for drowsiness. Lift nothing heavier than 10 pounds at work.   Schedule migue

## 2017-10-10 NOTE — TELEPHONE ENCOUNTER
From: Cisco Ocasio  To: Cary Mehta MD  Sent: 10/6/2017 3:20 PM CDT  Subject: Prescription Question    Hello,  I went to the ER as was told to do on Wednesday. They gave me Norco 325mg which I will be out of on Monday.  Could you please call in

## 2017-10-23 ENCOUNTER — PATIENT MESSAGE (OUTPATIENT)
Dept: INTERNAL MEDICINE CLINIC | Facility: CLINIC | Age: 67
End: 2017-10-23

## 2017-10-24 NOTE — TELEPHONE ENCOUNTER
From: Portia Ocasio  To: Anay Wang MD  Sent: 10/23/2017 8:07 PM CDT  Subject: Non-Urgent Medical Question    I have gone to three rehab visits with little to no relief.  The PT said there is a shot they can give in my spine that sometimes help

## 2017-10-24 NOTE — TELEPHONE ENCOUNTER
Recommend he schedule an appointment with Dr. Roland Pepper or with Dr. Stephen Tavares of Physiatry, who can evaluate him for a corticosteroid injection. Please provide him with contact information.

## 2017-10-27 ENCOUNTER — MED REC SCAN ONLY (OUTPATIENT)
Dept: INTERNAL MEDICINE CLINIC | Facility: CLINIC | Age: 67
End: 2017-10-27

## 2017-10-31 RX ORDER — CYCLOBENZAPRINE HCL 10 MG
TABLET ORAL
Qty: 30 TABLET | Refills: 0 | Status: SHIPPED | OUTPATIENT
Start: 2017-10-31 | End: 2017-11-13

## 2017-11-09 ENCOUNTER — APPOINTMENT (OUTPATIENT)
Dept: PHYSICAL THERAPY | Facility: HOSPITAL | Age: 67
End: 2017-11-09
Attending: INTERNAL MEDICINE
Payer: COMMERCIAL

## 2017-11-13 ENCOUNTER — MED REC SCAN ONLY (OUTPATIENT)
Dept: INTERNAL MEDICINE CLINIC | Facility: CLINIC | Age: 67
End: 2017-11-13

## 2017-11-13 ENCOUNTER — OFFICE VISIT (OUTPATIENT)
Dept: INTERNAL MEDICINE CLINIC | Facility: CLINIC | Age: 67
End: 2017-11-13

## 2017-11-13 ENCOUNTER — APPOINTMENT (OUTPATIENT)
Dept: PHYSICAL THERAPY | Facility: HOSPITAL | Age: 67
End: 2017-11-13
Attending: INTERNAL MEDICINE
Payer: COMMERCIAL

## 2017-11-13 VITALS
BODY MASS INDEX: 31.92 KG/M2 | HEIGHT: 64 IN | DIASTOLIC BLOOD PRESSURE: 82 MMHG | SYSTOLIC BLOOD PRESSURE: 128 MMHG | WEIGHT: 187 LBS | HEART RATE: 42 BPM

## 2017-11-13 DIAGNOSIS — I70.0 ATHEROSCLEROSIS OF AORTA (HCC): ICD-10-CM

## 2017-11-13 DIAGNOSIS — E78.00 HYPERCHOLESTEROLEMIA: ICD-10-CM

## 2017-11-13 DIAGNOSIS — Z86.010 HISTORY OF COLON POLYPS: ICD-10-CM

## 2017-11-13 DIAGNOSIS — M48.061 SPINAL STENOSIS OF LUMBAR REGION, UNSPECIFIED WHETHER NEUROGENIC CLAUDICATION PRESENT: ICD-10-CM

## 2017-11-13 DIAGNOSIS — I10 ESSENTIAL HYPERTENSION: ICD-10-CM

## 2017-11-13 DIAGNOSIS — I25.10 ASHD (ARTERIOSCLEROTIC HEART DISEASE): ICD-10-CM

## 2017-11-13 DIAGNOSIS — Z00.00 ANNUAL PHYSICAL EXAM: Primary | ICD-10-CM

## 2017-11-13 DIAGNOSIS — N40.0 BENIGN PROSTATIC HYPERPLASIA WITHOUT LOWER URINARY TRACT SYMPTOMS: ICD-10-CM

## 2017-11-13 DIAGNOSIS — I25.5 ISCHEMIC CARDIOMYOPATHY: ICD-10-CM

## 2017-11-13 PROCEDURE — 99397 PER PM REEVAL EST PAT 65+ YR: CPT | Performed by: INTERNAL MEDICINE

## 2017-11-13 RX ORDER — CLOPIDOGREL BISULFATE 75 MG/1
75 TABLET ORAL DAILY
COMMUNITY
End: 2021-02-12

## 2017-11-13 NOTE — PATIENT INSTRUCTIONS
Please continue your current medications. Await  upcoming appointment with Dr. Kennedi Alfaro. Return visit in 6 months.

## 2017-11-13 NOTE — H&P
Ramya Cornelius is a 69year old male who presents for a complete physical exam.   HPI:   Symptoms of lumbar spinal stenosis persist, and have been refractory to NSAIDs, muscle relaxants and physical therapy.   He has an appointment scheduled with  Rfl: 5   Metoprolol Succinate ER (TOPROL XL) 25 MG Oral Tablet 24 Hr Take 25 mg by mouth daily. Disp:  Rfl: 3   Cholecalciferol (VITAMIN D) 1000 UNITS Oral Cap Take 1 capsule by mouth daily.    Disp:  Rfl:    Atorvastatin Calcium (LIPITOR) 80 MG Oral Tab Anicteric, conjunctiva pink, oropharynx normal except poor dentition  NECK: Supple without mass or thyromegaly  NODES: No peripheral adenopathy  LUNGS: Resonant to percussion and clear to auscultation  CARDIAC: Rhythm regular S1 S2 normal without murmur or

## 2017-11-15 ENCOUNTER — APPOINTMENT (OUTPATIENT)
Dept: PHYSICAL THERAPY | Facility: HOSPITAL | Age: 67
End: 2017-11-15
Attending: INTERNAL MEDICINE
Payer: COMMERCIAL

## 2017-11-20 ENCOUNTER — APPOINTMENT (OUTPATIENT)
Dept: PHYSICAL THERAPY | Facility: HOSPITAL | Age: 67
End: 2017-11-20
Attending: INTERNAL MEDICINE
Payer: COMMERCIAL

## 2017-11-21 ENCOUNTER — PRIOR ORIGINAL RECORDS (OUTPATIENT)
Dept: OTHER | Age: 67
End: 2017-11-21

## 2017-11-21 LAB
CHOLESTEROL, TOTAL: 119 MG/DL
HDL CHOLESTEROL: 33 MG/DL
LDL CHOLESTEROL: 65 MG/DL
NON-HDL CHOLESTEROL: 86 MG/DL
TRIGLYCERIDES: 107 MG/DL

## 2017-11-22 ENCOUNTER — APPOINTMENT (OUTPATIENT)
Dept: PHYSICAL THERAPY | Facility: HOSPITAL | Age: 67
End: 2017-11-22
Attending: INTERNAL MEDICINE
Payer: COMMERCIAL

## 2017-11-24 ENCOUNTER — LAB ENCOUNTER (OUTPATIENT)
Dept: LAB | Facility: HOSPITAL | Age: 67
End: 2017-11-24
Attending: PHYSICAL MEDICINE & REHABILITATION
Payer: COMMERCIAL

## 2017-11-24 DIAGNOSIS — Z51.81 ENCOUNTER FOR THERAPEUTIC DRUG LEVEL MONITORING: Primary | ICD-10-CM

## 2017-11-24 PROCEDURE — 36415 COLL VENOUS BLD VENIPUNCTURE: CPT

## 2017-11-24 PROCEDURE — 85610 PROTHROMBIN TIME: CPT

## 2017-11-24 PROCEDURE — 85730 THROMBOPLASTIN TIME PARTIAL: CPT

## 2017-11-27 ENCOUNTER — PRIOR ORIGINAL RECORDS (OUTPATIENT)
Dept: OTHER | Age: 67
End: 2017-11-27

## 2017-11-28 ENCOUNTER — APPOINTMENT (OUTPATIENT)
Dept: PHYSICAL THERAPY | Facility: HOSPITAL | Age: 67
End: 2017-11-28
Attending: INTERNAL MEDICINE
Payer: COMMERCIAL

## 2017-11-30 ENCOUNTER — APPOINTMENT (OUTPATIENT)
Dept: PHYSICAL THERAPY | Facility: HOSPITAL | Age: 67
End: 2017-11-30
Attending: INTERNAL MEDICINE
Payer: COMMERCIAL

## 2017-12-20 ENCOUNTER — PATIENT MESSAGE (OUTPATIENT)
Dept: INTERNAL MEDICINE CLINIC | Facility: CLINIC | Age: 67
End: 2017-12-20

## 2017-12-20 RX ORDER — HYDROCODONE BITARTRATE AND ACETAMINOPHEN 5; 325 MG/1; MG/1
1 TABLET ORAL 2 TIMES DAILY PRN
Qty: 20 TABLET | Refills: 0 | Status: SHIPPED | OUTPATIENT
Start: 2017-12-20 | End: 2018-02-01

## 2017-12-20 NOTE — TELEPHONE ENCOUNTER
From: Alisa Ocasio  To: Wesley Tam MD  Sent: 12/20/2017 9:35 AM CST  Subject: Prescription Question    Can Dr. Franklin Guevara prescribe a pain med for me.  I still have not received my cortisone shot as I have to go thru subrogation with workmans comp

## 2017-12-20 NOTE — TELEPHONE ENCOUNTER
Prescription for hydrocodone/acetaminophen 5/325 mg #20 1 pill twice daily as needed signed, after querying the [facility] Prescription Drug monitoring website. He will need to come in to  the prescription.

## 2017-12-21 NOTE — TELEPHONE ENCOUNTER
Contacted pt and informed him that rx has been signed and ready for . Will place at [facility] THE [facility]  for .   He verbalized understanding

## 2018-01-18 RX ORDER — GABAPENTIN 100 MG/1
CAPSULE ORAL
Qty: 120 CAPSULE | Refills: 4 | Status: SHIPPED | OUTPATIENT
Start: 2018-01-18 | End: 2018-06-24

## 2018-02-03 RX ORDER — HYDROCODONE BITARTRATE AND ACETAMINOPHEN 5; 325 MG/1; MG/1
1 TABLET ORAL 2 TIMES DAILY PRN
Qty: 20 TABLET | Refills: 0
Start: 2018-02-03 | End: 2018-02-03

## 2018-02-03 RX ORDER — HYDROCODONE BITARTRATE AND ACETAMINOPHEN 5; 325 MG/1; MG/1
1 TABLET ORAL 2 TIMES DAILY PRN
Qty: 20 TABLET | Refills: 0 | Status: SHIPPED | OUTPATIENT
Start: 2018-02-03 | End: 2018-04-10

## 2018-02-03 NOTE — TELEPHONE ENCOUNTER
Prescription for generic Norco 5/325 mg #20 tablets signed, after Sylvia's queried. He will need to come in to  prescription.   Recommend appointment in the office for reevaluation prior to any additional refills

## 2018-02-05 NOTE — TELEPHONE ENCOUNTER
Pls call patient and inform him that script has been signed and ready for  at [facility] [employer] .   Also, schedule an appt for reevaluation prior to any additional refills

## 2018-02-26 ENCOUNTER — PRIOR ORIGINAL RECORDS (OUTPATIENT)
Dept: OTHER | Age: 68
End: 2018-02-26

## 2018-04-02 RX ORDER — HYDROCODONE BITARTRATE AND ACETAMINOPHEN 5; 325 MG/1; MG/1
1 TABLET ORAL 2 TIMES DAILY PRN
Qty: 20 TABLET | Refills: 0 | OUTPATIENT
Start: 2018-04-02

## 2018-04-02 NOTE — TELEPHONE ENCOUNTER
LOV: 11/13/17 Last Rx: 2/3/18    Please advise in regards to refill request. Thank You    Please respond to pool: [facility] LPN/ANTONIA

## 2018-04-06 RX ORDER — HYDROCODONE BITARTRATE AND ACETAMINOPHEN 5; 325 MG/1; MG/1
1 TABLET ORAL 2 TIMES DAILY PRN
Qty: 20 TABLET | Refills: 0 | OUTPATIENT
Start: 2018-04-06

## 2018-04-06 NOTE — TELEPHONE ENCOUNTER
Pt is calling for status of his medication refill request. Per pt he is out of medication and can be reached at [phone].

## 2018-04-10 ENCOUNTER — OFFICE VISIT (OUTPATIENT)
Dept: INTERNAL MEDICINE CLINIC | Facility: CLINIC | Age: 68
End: 2018-04-10

## 2018-04-10 VITALS
DIASTOLIC BLOOD PRESSURE: 84 MMHG | HEIGHT: 64 IN | WEIGHT: 203 LBS | HEART RATE: 63 BPM | BODY MASS INDEX: 34.66 KG/M2 | SYSTOLIC BLOOD PRESSURE: 125 MMHG

## 2018-04-10 DIAGNOSIS — M48.061 SPINAL STENOSIS OF LUMBAR REGION, UNSPECIFIED WHETHER NEUROGENIC CLAUDICATION PRESENT: Primary | ICD-10-CM

## 2018-04-10 PROCEDURE — 99213 OFFICE O/P EST LOW 20 MIN: CPT | Performed by: INTERNAL MEDICINE

## 2018-04-10 PROCEDURE — 99212 OFFICE O/P EST SF 10 MIN: CPT | Performed by: INTERNAL MEDICINE

## 2018-04-10 RX ORDER — TEMAZEPAM 30 MG/1
CAPSULE ORAL
Qty: 30 CAPSULE | Refills: 4 | Status: CANCELLED | OUTPATIENT
Start: 2018-04-10

## 2018-04-10 RX ORDER — HYDROCODONE BITARTRATE AND ACETAMINOPHEN 5; 325 MG/1; MG/1
1 TABLET ORAL
Qty: 10 TABLET | Refills: 0 | Status: SHIPPED | OUTPATIENT
Start: 2018-04-10 | End: 2018-08-14

## 2018-04-10 RX ORDER — TEMAZEPAM 30 MG/1
30 CAPSULE ORAL NIGHTLY PRN
Qty: 30 CAPSULE | Refills: 5 | Status: SHIPPED | OUTPATIENT
Start: 2018-04-10 | End: 2018-09-26

## 2018-04-10 NOTE — PROGRESS NOTES
Natalio Crawley is a 77year old male. Patient presents with:  Neck Pain  Ear Pain    HPI:   After his last visit with me in November, he saw Dr. Gabriella Dickinson of Physiatry once. A lumbar epidural steroid injection was recommended.   However, treatment is on Rfl: 3   Cholecalciferol (VITAMIN D) 1000 UNITS Oral Cap Take 1 capsule by mouth daily. Disp:  Rfl:    Atorvastatin Calcium (LIPITOR) 80 MG Oral Tab Take 80 mg by mouth nightly.    Disp:  Rfl:      No Known Allergies   Past Medical History:   Diagnosis Da treatment of lumbar spinal stenosis. Hopefully LESI soon. Prescription for hydrocodone/acetaminophen 5/325 mg #10 pills given, with recommendations to moderate use. Prescription refill for 6 months of temazepam given.   Both prescriptions given after que

## 2018-06-24 RX ORDER — GABAPENTIN 100 MG/1
CAPSULE ORAL
Qty: 60 CAPSULE | Refills: 3 | Status: SHIPPED | OUTPATIENT
Start: 2018-06-24 | End: 2018-08-24

## 2018-08-14 ENCOUNTER — TELEPHONE (OUTPATIENT)
Dept: NEUROLOGY | Facility: CLINIC | Age: 68
End: 2018-08-14

## 2018-08-14 ENCOUNTER — OFFICE VISIT (OUTPATIENT)
Dept: NEUROLOGY | Facility: CLINIC | Age: 68
End: 2018-08-14
Payer: COMMERCIAL

## 2018-08-14 VITALS
DIASTOLIC BLOOD PRESSURE: 74 MMHG | WEIGHT: 195 LBS | RESPIRATION RATE: 16 BRPM | HEIGHT: 71 IN | HEART RATE: 64 BPM | SYSTOLIC BLOOD PRESSURE: 110 MMHG | BODY MASS INDEX: 27.3 KG/M2

## 2018-08-14 DIAGNOSIS — G89.29 CHRONIC RIGHT-SIDED LOW BACK PAIN WITHOUT SCIATICA: Primary | ICD-10-CM

## 2018-08-14 DIAGNOSIS — M54.50 CHRONIC RIGHT-SIDED LOW BACK PAIN WITHOUT SCIATICA: Primary | ICD-10-CM

## 2018-08-14 PROCEDURE — 99204 OFFICE O/P NEW MOD 45 MIN: CPT | Performed by: PHYSICAL MEDICINE & REHABILITATION

## 2018-08-14 RX ORDER — HYDROCODONE BITARTRATE AND ACETAMINOPHEN 5; 325 MG/1; MG/1
1 TABLET ORAL 2 TIMES DAILY PRN
Qty: 40 TABLET | Refills: 0 | Status: SHIPPED | OUTPATIENT
Start: 2018-08-14 | End: 2018-10-09

## 2018-08-14 NOTE — H&P
[address], [address], [facility]    History and Physical    [facility] Patient Status:  No patient class for patient encounter    1950 MRN [id number]   Location [address] [place] [address] 200mg BID. No lumbosacral injections or surgery. Norco 5/325 1 tab as needed. He was using this infrequently, and not every day.   Current Pain: 8      History     Past Medical History:   Diagnosis Date   • ASHD (arteriosclerotic heart disease) 2013    Ac 16, height 71\", weight 195 lb. Nursing note and vitals reviewed. Constitutional: He appears well-developed and well-nourished. HENT:   Head: Normocephalic.    Eyes: Conjunctivae and EOM are normal.   Cardiovascular: Normal rate, regular rhythm and n interruption of the expected lumbar lordosis with                        slight anterolisthesis of L4 on L5. BONES:                No fracture or suspicious osseous lesion is evident. Heterogeneous marrow signal is demonstrated. associated buckling         of the ligamentum flavum. These findings result in moderate to         severe spinal canal stenosis and severe bilateral neuroforaminal         encroachment.  There is bilateral subarticular zone impingement of         the descen through L5-S1. No apparent spondylolysis. Atherosclerotic calcifications abdominal aorta without apparent aneurysmal dilatation.        Dictated by (CST): Waleska Montes MD on 10/04/2017 at 17:40       Approved by (CST): Waleska Montes MD on 10/04/2017

## 2018-08-14 NOTE — TELEPHONE ENCOUNTER
[employer] for authorization of approval for MRI L-spine wo. Approval was given with  Authorization # [id number] effective 08/14/18 to 09/12/18. Will call Pt. To inform. L/m advising of approval. Can proceed with scheduling appt.

## 2018-08-14 NOTE — PATIENT INSTRUCTIONS
1) Ask your cardiologist if you can hold the plavix for 7 days for an epidural steroid injection. You may continue the low dose aspirin for the procedure. 2) Have the MRI of the lumbar spine done so I know where best to do the injection.      3) Norco 5 procedure days in the hospitals. · Patient must present photo ID at time of . If a designated family member will be picking up prescription, office must be given name of individual in advance and they must present an ID as well.   · The name of the

## 2018-08-20 ENCOUNTER — PRIOR ORIGINAL RECORDS (OUTPATIENT)
Dept: OTHER | Age: 68
End: 2018-08-20

## 2018-08-20 ENCOUNTER — LAB ENCOUNTER (OUTPATIENT)
Dept: LAB | Facility: HOSPITAL | Age: 68
End: 2018-08-20
Attending: INTERNAL MEDICINE
Payer: COMMERCIAL

## 2018-08-20 DIAGNOSIS — E78.00 PURE HYPERCHOLESTEROLEMIA: Primary | ICD-10-CM

## 2018-08-20 LAB
ALBUMIN SERPL BCP-MCNC: 3.6 G/DL (ref 3.5–4.8)
ALBUMIN/GLOB SERPL: 1.3 {RATIO} (ref 1–2)
ALP SERPL-CCNC: 84 U/L (ref 32–100)
ALT SERPL-CCNC: 10 U/L (ref 17–63)
ANION GAP SERPL CALC-SCNC: 6 MMOL/L (ref 0–18)
AST SERPL-CCNC: 20 U/L (ref 15–41)
BILIRUB SERPL-MCNC: 0.9 MG/DL (ref 0.3–1.2)
BUN SERPL-MCNC: 17 MG/DL (ref 8–20)
BUN/CREAT SERPL: 15.7 (ref 10–20)
CALCIUM SERPL-MCNC: 9.2 MG/DL (ref 8.5–10.5)
CHLORIDE SERPL-SCNC: 101 MMOL/L (ref 95–110)
CHOLEST SERPL-MCNC: 119 MG/DL (ref 110–200)
CO2 SERPL-SCNC: 29 MMOL/L (ref 22–32)
CREAT SERPL-MCNC: 1.08 MG/DL (ref 0.5–1.5)
GLOBULIN PLAS-MCNC: 2.8 G/DL (ref 2.5–3.7)
GLUCOSE SERPL-MCNC: 95 MG/DL (ref 70–99)
HDLC SERPL-MCNC: 30 MG/DL
LDLC SERPL CALC-MCNC: 58 MG/DL (ref 0–99)
NONHDLC SERPL-MCNC: 89 MG/DL
OSMOLALITY UR CALC.SUM OF ELEC: 283 MOSM/KG (ref 275–295)
PATIENT FASTING: YES
POTASSIUM SERPL-SCNC: 3.6 MMOL/L (ref 3.3–5.1)
PROT SERPL-MCNC: 6.4 G/DL (ref 5.9–8.4)
SODIUM SERPL-SCNC: 136 MMOL/L (ref 136–144)
TRIGL SERPL-MCNC: 154 MG/DL (ref 1–149)

## 2018-08-20 PROCEDURE — 36415 COLL VENOUS BLD VENIPUNCTURE: CPT

## 2018-08-20 PROCEDURE — 80053 COMPREHEN METABOLIC PANEL: CPT

## 2018-08-20 PROCEDURE — 80061 LIPID PANEL: CPT

## 2018-08-22 ENCOUNTER — PRIOR ORIGINAL RECORDS (OUTPATIENT)
Dept: OTHER | Age: 68
End: 2018-08-22

## 2018-08-22 LAB
ALBUMIN: 3.6 G/DL
ALKALINE PHOSPHATATE(ALK PHOS): 84 IU/L
ALT (SGPT): 21 U/L
AST (SGOT): 20 U/L
BILIRUBIN TOTAL: 0.9 MG/DL
BUN: 17 MG/DL
CALCIUM: 9.2 MG/DL
CHLORIDE: 101 MEQ/L
CHOLESTEROL, TOTAL: 119 MG/DL
CREATININE, SERUM: 1.08 MG/DL
GLOBULIN: 2.8 G/DL
GLUCOSE: 95 MG/DL
GLUCOSE: 95 MG/DL
HDL CHOLESTEROL: 30 MG/DL
LDL CHOLESTEROL: 58 MG/DL
POTASSIUM, SERUM: 3.6 MEQ/L
PROTEIN, TOTAL: 6.4 G/DL
SGOT (AST): 20 IU/L
SGPT (ALT): 21 IU/L
SODIUM: 136 MEQ/L
TRIGLYCERIDES: 154 MG/DL

## 2018-08-23 ENCOUNTER — HOSPITAL ENCOUNTER (OUTPATIENT)
Dept: MRI IMAGING | Facility: HOSPITAL | Age: 68
Discharge: HOME | End: 2018-08-23
Attending: PHYSICAL MEDICINE & REHABILITATION
Payer: COMMERCIAL

## 2018-08-23 DIAGNOSIS — M54.50 CHRONIC RIGHT-SIDED LOW BACK PAIN WITHOUT SCIATICA: ICD-10-CM

## 2018-08-23 DIAGNOSIS — G89.29 CHRONIC RIGHT-SIDED LOW BACK PAIN WITHOUT SCIATICA: ICD-10-CM

## 2018-08-23 PROCEDURE — 72148 MRI LUMBAR SPINE W/O DYE: CPT | Performed by: PHYSICAL MEDICINE & REHABILITATION

## 2018-08-24 ENCOUNTER — PRIOR ORIGINAL RECORDS (OUTPATIENT)
Dept: OTHER | Age: 68
End: 2018-08-24

## 2018-08-24 ENCOUNTER — TELEPHONE (OUTPATIENT)
Dept: NEUROLOGY | Facility: CLINIC | Age: 68
End: 2018-08-24

## 2018-08-24 DIAGNOSIS — M48.062 LUMBAR STENOSIS WITH NEUROGENIC CLAUDICATION: Primary | ICD-10-CM

## 2018-08-24 RX ORDER — GABAPENTIN 100 MG/1
CAPSULE ORAL
Qty: 60 CAPSULE | Refills: 2 | Status: SHIPPED | OUTPATIENT
Start: 2018-08-24 | End: 2018-10-06

## 2018-08-24 NOTE — TELEPHONE ENCOUNTER
Right L4 TFESI ordered due to L3-4 severe central stenosis. Needs permission to hold plavix prior to scheduling.

## 2018-08-24 NOTE — TELEPHONE ENCOUNTER
Right L4 transforaminal epidural steroid injection under fluoroscopy. Please note that he needs permission to hold plavix for 7 days prior to scheduling him for procedure.     With CPT codes 51989 / [facility] [phone] for Authorization of Ap

## 2018-08-27 ENCOUNTER — TELEPHONE (OUTPATIENT)
Dept: NEUROLOGY | Facility: CLINIC | Age: 68
End: 2018-08-27

## 2018-08-27 ENCOUNTER — MED REC SCAN ONLY (OUTPATIENT)
Dept: NEUROLOGY | Facility: CLINIC | Age: 68
End: 2018-08-27

## 2018-08-27 NOTE — TELEPHONE ENCOUNTER
Spoke to patient's wife and notified her of below. She was understanding. She states that they did check with Dr. Mcginnis Handing and received okay to hold plavix 7 days prior to procedure. Per Dr. Brad Ervin, okay for patient to continue with low dose aspirin.  Al

## 2018-08-27 NOTE — TELEPHONE ENCOUNTER
Sent him a [employer] message on 8/24. I've copy/pasted the message below:    Good morning,   I've reviewed the lumbar spine results and you have narrowing or stenosis at several levels of the lumbar spine.  In most levels the narrowing is mild, but at one par

## 2018-08-28 NOTE — TELEPHONE ENCOUNTER
Called [facility] to check on status  for authorization of approval of right L4 TFESI. Talked to [id number]. who states it is still pending. Procedure is scheduled on 09/05/18.

## 2018-08-30 NOTE — TELEPHONE ENCOUNTER
Called [facility] to check on status of approval for right L4 TFESI. T/t Alonso Negron states it was approved with Authorization # [id number] for DOS: 09/05/18.

## 2018-09-04 NOTE — TELEPHONE ENCOUNTER
Called [facility] Intake to verify DOS for this patients' procedure, t/t Claudia WATT, stated that they are showing DOS 09/05/2018

## 2018-09-05 ENCOUNTER — OFFICE VISIT (OUTPATIENT)
Dept: SURGERY | Facility: CLINIC | Age: 68
End: 2018-09-05
Payer: COMMERCIAL

## 2018-09-05 DIAGNOSIS — M48.062 LUMBAR STENOSIS WITH NEUROGENIC CLAUDICATION: Primary | ICD-10-CM

## 2018-09-05 PROCEDURE — 64483 NJX AA&/STRD TFRM EPI L/S 1: CPT | Performed by: PHYSICAL MEDICINE & REHABILITATION

## 2018-09-05 NOTE — PROCEDURES
[address].    LUMBAR TRANSFORAMINAL   NAME:  Marianna Baca    MR #:    [id number] :  1950     PHYSICIAN:  Nolene Saint        Operative Report    DATE OF PROCEDURE: 2018   PREOPERATIVE DIAGNOSES: 1.  Lumbar stenos in the clinic as scheduled. Throughout the whole procedure, the patient's pulse oximetry and vital signs were monitored and they remained completely stable.   Also, throughout the whole procedure, prior to injection of any medication, aspiration was perfor

## 2018-09-27 RX ORDER — TEMAZEPAM 30 MG/1
30 CAPSULE ORAL NIGHTLY PRN
Qty: 30 CAPSULE | Refills: 0 | OUTPATIENT
Start: 2018-09-27 | End: 2018-10-27

## 2018-09-27 NOTE — TELEPHONE ENCOUNTER
OK to refill temazepam once. Please call in.  [facility] prescription drug monitoring website queried

## 2018-09-29 RX ORDER — TEMAZEPAM 30 MG/1
CAPSULE ORAL
Qty: 30 CAPSULE | Refills: 4 | OUTPATIENT
Start: 2018-09-29

## 2018-10-06 RX ORDER — GABAPENTIN 100 MG/1
CAPSULE ORAL
Qty: 60 CAPSULE | Refills: 0 | Status: SHIPPED | OUTPATIENT
Start: 2018-10-06 | End: 2018-10-20

## 2018-10-09 ENCOUNTER — TELEPHONE (OUTPATIENT)
Dept: NEUROLOGY | Facility: CLINIC | Age: 68
End: 2018-10-09

## 2018-10-09 ENCOUNTER — OFFICE VISIT (OUTPATIENT)
Dept: NEUROLOGY | Facility: CLINIC | Age: 68
End: 2018-10-09
Payer: COMMERCIAL

## 2018-10-09 VITALS
SYSTOLIC BLOOD PRESSURE: 98 MMHG | HEIGHT: 71 IN | HEART RATE: 50 BPM | BODY MASS INDEX: 26.6 KG/M2 | WEIGHT: 190 LBS | RESPIRATION RATE: 16 BRPM | DIASTOLIC BLOOD PRESSURE: 62 MMHG

## 2018-10-09 DIAGNOSIS — M71.38 SYNOVIAL CYST OF LUMBAR FACET JOINT: ICD-10-CM

## 2018-10-09 DIAGNOSIS — M71.38 SYNOVIAL CYST OF LUMBAR FACET JOINT: Primary | ICD-10-CM

## 2018-10-09 DIAGNOSIS — M48.062 LUMBAR STENOSIS WITH NEUROGENIC CLAUDICATION: ICD-10-CM

## 2018-10-09 PROCEDURE — 99213 OFFICE O/P EST LOW 20 MIN: CPT | Performed by: PHYSICAL MEDICINE & REHABILITATION

## 2018-10-09 RX ORDER — HYDROCODONE BITARTRATE AND ACETAMINOPHEN 5; 325 MG/1; MG/1
1 TABLET ORAL 2 TIMES DAILY PRN
Qty: 60 TABLET | Refills: 0 | Status: SHIPPED | OUTPATIENT
Start: 2018-10-09 | End: 2018-12-04

## 2018-10-09 NOTE — PROGRESS NOTES
HPI:    Patient ID: Ramya Cornelius is a 77year old male. He presents with axial right-sided low back pain that worsens with ambulation, improves with sitting.   MRI of the lumbar spine shows a right L1-L2 medially directed synovial cyst with mi Cholecalciferol (VITAMIN D) 1000 UNITS Oral Cap Take 1 capsule by mouth daily. Disp:  Rfl:    Atorvastatin Calcium (LIPITOR) 80 MG Oral Tab Take 80 mg by mouth nightly.    Disp:  Rfl:    acetaminophen 500 MG Oral Tab Take 500 mg by mouth every 6 (six) h

## 2018-10-09 NOTE — TELEPHONE ENCOUNTER
Called [facility] for authorization of approval of right L1-2 facet joint aspiration and injection cpt  code 37102. Office is now closed.  Will call in the am.

## 2018-10-10 ENCOUNTER — PRIOR ORIGINAL RECORDS (OUTPATIENT)
Dept: OTHER | Age: 68
End: 2018-10-10

## 2018-10-10 NOTE — TELEPHONE ENCOUNTER
Called [facility] for authorization of approval of right L1-2 facet joint aspiration and injection cpt  code 09710,27913. T/t Damián Schulz who initiated request. Reference # [id number], will fax clinical notes. Faxed notes pending approval.

## 2018-10-12 ENCOUNTER — MED REC SCAN ONLY (OUTPATIENT)
Dept: INTERNAL MEDICINE CLINIC | Facility: CLINIC | Age: 68
End: 2018-10-12

## 2018-10-12 ENCOUNTER — PRIOR ORIGINAL RECORDS (OUTPATIENT)
Dept: OTHER | Age: 68
End: 2018-10-12

## 2018-10-12 ENCOUNTER — OFFICE VISIT (OUTPATIENT)
Dept: INTERNAL MEDICINE CLINIC | Facility: CLINIC | Age: 68
End: 2018-10-12
Payer: COMMERCIAL

## 2018-10-12 ENCOUNTER — MYAURORA ACCOUNT LINK (OUTPATIENT)
Dept: OTHER | Age: 68
End: 2018-10-12

## 2018-10-12 VITALS
HEIGHT: 71 IN | BODY MASS INDEX: 27.3 KG/M2 | WEIGHT: 195 LBS | SYSTOLIC BLOOD PRESSURE: 128 MMHG | HEART RATE: 49 BPM | DIASTOLIC BLOOD PRESSURE: 68 MMHG

## 2018-10-12 DIAGNOSIS — I70.0 ATHEROSCLEROSIS OF AORTA (HCC): ICD-10-CM

## 2018-10-12 DIAGNOSIS — I25.5 ISCHEMIC CARDIOMYOPATHY: ICD-10-CM

## 2018-10-12 DIAGNOSIS — M48.062 LUMBAR STENOSIS WITH NEUROGENIC CLAUDICATION: ICD-10-CM

## 2018-10-12 DIAGNOSIS — I25.10 ASHD (ARTERIOSCLEROTIC HEART DISEASE): Primary | ICD-10-CM

## 2018-10-12 DIAGNOSIS — E78.00 HYPERCHOLESTEROLEMIA: ICD-10-CM

## 2018-10-12 DIAGNOSIS — I10 ESSENTIAL HYPERTENSION: ICD-10-CM

## 2018-10-12 DIAGNOSIS — Z86.010 HISTORY OF COLON POLYPS: ICD-10-CM

## 2018-10-12 PROCEDURE — 90472 IMMUNIZATION ADMIN EACH ADD: CPT | Performed by: INTERNAL MEDICINE

## 2018-10-12 PROCEDURE — 99214 OFFICE O/P EST MOD 30 MIN: CPT | Performed by: INTERNAL MEDICINE

## 2018-10-12 PROCEDURE — 99212 OFFICE O/P EST SF 10 MIN: CPT | Performed by: INTERNAL MEDICINE

## 2018-10-12 PROCEDURE — 90653 IIV ADJUVANT VACCINE IM: CPT | Performed by: INTERNAL MEDICINE

## 2018-10-12 PROCEDURE — 90670 PCV13 VACCINE IM: CPT | Performed by: INTERNAL MEDICINE

## 2018-10-12 PROCEDURE — 90471 IMMUNIZATION ADMIN: CPT | Performed by: INTERNAL MEDICINE

## 2018-10-12 NOTE — H&P
Yulia Mendoza is a 77year old male who presents this morning for follow-up of ASHD, ischemic cardiomyopathy, hypertension and hypercholesterolemia. HPI:   Lately he has been feeling fairly well.   Chronic low back pain without leg pain numbness ti Rfl:    hydrochlorothiazide 25 MG Oral Tab Take 1 tablet by mouth daily. Disp:  Rfl: 3   lisinopril 20 MG Oral Tab Take 20 mg by mouth daily. Disp:  Rfl: 5   Metoprolol Succinate ER (TOPROL XL) 25 MG Oral Tablet 24 Hr Take 25 mg by mouth daily.    Disp:  Rf cough wheezing or shortness of breath  CARDIAC: No lightheadedness palpitations or chest pain  GI: No anorexia heartburn dysphagia nausea vomiting abdominal pain diarrhea constipation or rectal bleeding  : No urinary frequency dysuria or hematuria  MUSCU

## 2018-10-12 NOTE — PATIENT INSTRUCTIONS
You received a flu shot and Prevnar, the second pneumonia shot, today. Continue current medications. Continue follow up with Dr. Wing Russell and Dr. Anjelica Crystal. Return visit in 6 months.

## 2018-10-15 ENCOUNTER — TELEPHONE (OUTPATIENT)
Dept: NEUROLOGY | Facility: CLINIC | Age: 68
End: 2018-10-15

## 2018-10-15 NOTE — TELEPHONE ENCOUNTER
Received note from Dr. Seamus Sosa office stating it is okay for patient to hold plavix for 7 days prior to procedure and aspirin 3 days prior to procedure. Will await approval before scheduling patient.

## 2018-10-16 ENCOUNTER — MED REC SCAN ONLY (OUTPATIENT)
Dept: NEUROLOGY | Facility: CLINIC | Age: 68
End: 2018-10-16

## 2018-10-16 NOTE — TELEPHONE ENCOUNTER
Called [facility] for authorization of approval of right L1-2 facet joint aspiration and injection cpt  code 21512,94410. T/t Bessy Garner Nurse reviewer who approved with Authorization # [id number] for one unit/DOS. . Will need appt/DOS to complete authorization.  Will info

## 2018-10-17 NOTE — TELEPHONE ENCOUNTER
Spoke to patient and his wife and notified them of approval.     Patient was scheduled for right L1-2 facet joint aspiration and injection under fluoroscopy on Wednesday, October 24, 2018. Medications and allergies reviewed.  Patient informed to hold aspir

## 2018-10-20 RX ORDER — GABAPENTIN 100 MG/1
CAPSULE ORAL
Qty: 120 CAPSULE | Refills: 5 | Status: SHIPPED | OUTPATIENT
Start: 2018-10-20 | End: 2019-04-29

## 2018-10-22 NOTE — TELEPHONE ENCOUNTER
Called [facility] to provide DOS. T/t Guillermo SAENZ who was informed right L1-2 facet joint injection was scheduled on 10/24/18.

## 2018-10-24 ENCOUNTER — OFFICE VISIT (OUTPATIENT)
Dept: SURGERY | Facility: CLINIC | Age: 68
End: 2018-10-24

## 2018-10-24 DIAGNOSIS — M71.38 SYNOVIAL CYST OF LUMBAR FACET JOINT: Primary | ICD-10-CM

## 2018-10-24 PROCEDURE — 64493 INJ PARAVERT F JNT L/S 1 LEV: CPT | Performed by: PHYSICAL MEDICINE & REHABILITATION

## 2018-10-24 NOTE — PROCEDURES
[facility] Z-JOINT/FACET INJECTIONS  NAME:  Monique Chen    MR #:    [id number] :  1950     PHYSICIAN:  Kip Everett        Operative Report    DATE OF PROCEDURE: 10/24/2018   PREOPERATIVE DIAGNOSES: 1.  Ri whole procedure, the patient's pulse oximetry and vital signs were monitored and they remained completely stable. Also, throughout the whole procedure, prior to injection of any medication, aspiration was performed.   No blood, fluid, or air was aspirated

## 2018-10-27 RX ORDER — TEMAZEPAM 30 MG/1
CAPSULE ORAL
Qty: 30 CAPSULE | Refills: 0 | OUTPATIENT
Start: 2018-10-27 | End: 2018-11-23

## 2018-10-27 NOTE — TELEPHONE ENCOUNTER
No Protocol on this med.        Requested Prescriptions     Pending Prescriptions Disp Refills   • TEMAZEPAM 30 MG Oral Cap [Pharmacy Med Name: Temazepam Oral Capsule 30 MG] 30 capsule 0     Sig: TAKE ONE CAPSULE BY MOUTH AT BEDTIME AS NEEDED FOR SLEEP

## 2018-10-28 NOTE — TELEPHONE ENCOUNTER
pls see MD message on 10-27-18.   Duplicate request.         Requested Prescriptions     Pending Prescriptions Disp Refills   • TEMAZEPAM 30 MG Oral Cap [Pharmacy Med Name: Temazepam Oral Capsule 30 MG] 30 capsule 0     Sig: TAKE ONE CAPSULE BY MOUTH AT BED

## 2018-10-29 NOTE — TELEPHONE ENCOUNTER
RX Temazepam phoned into [address] spoke to [facility] pharmacist.

## 2018-11-05 ENCOUNTER — PRIOR ORIGINAL RECORDS (OUTPATIENT)
Dept: OTHER | Age: 68
End: 2018-11-05

## 2018-11-23 RX ORDER — TEMAZEPAM 30 MG/1
CAPSULE ORAL
Qty: 30 CAPSULE | Refills: 0 | OUTPATIENT
Start: 2018-11-23 | End: 2018-12-22

## 2018-11-24 RX ORDER — TEMAZEPAM 30 MG/1
CAPSULE ORAL
Qty: 30 CAPSULE | Refills: 0 | OUTPATIENT
Start: 2018-11-24

## 2018-12-04 ENCOUNTER — TELEPHONE (OUTPATIENT)
Dept: NEUROLOGY | Facility: CLINIC | Age: 68
End: 2018-12-04

## 2018-12-04 ENCOUNTER — OFFICE VISIT (OUTPATIENT)
Dept: NEUROLOGY | Facility: CLINIC | Age: 68
End: 2018-12-04
Payer: COMMERCIAL

## 2018-12-04 ENCOUNTER — MED REC SCAN ONLY (OUTPATIENT)
Dept: NEUROLOGY | Facility: CLINIC | Age: 68
End: 2018-12-04

## 2018-12-04 VITALS
HEART RATE: 60 BPM | RESPIRATION RATE: 16 BRPM | WEIGHT: 180 LBS | BODY MASS INDEX: 25.2 KG/M2 | HEIGHT: 71 IN | DIASTOLIC BLOOD PRESSURE: 62 MMHG | SYSTOLIC BLOOD PRESSURE: 102 MMHG

## 2018-12-04 DIAGNOSIS — M48.062 LUMBAR STENOSIS WITH NEUROGENIC CLAUDICATION: ICD-10-CM

## 2018-12-04 DIAGNOSIS — M79.18 MYOFASCIAL PAIN: Primary | ICD-10-CM

## 2018-12-04 PROCEDURE — 99213 OFFICE O/P EST LOW 20 MIN: CPT | Performed by: PHYSICAL MEDICINE & REHABILITATION

## 2018-12-04 PROCEDURE — 20552 NJX 1/MLT TRIGGER POINT 1/2: CPT | Performed by: PHYSICAL MEDICINE & REHABILITATION

## 2018-12-04 RX ORDER — LIDOCAINE HYDROCHLORIDE 10 MG/ML
5 INJECTION, SOLUTION INFILTRATION; PERINEURAL ONCE
Status: COMPLETED | OUTPATIENT
Start: 2018-12-04 | End: 2018-12-04

## 2018-12-04 RX ORDER — HYDROCODONE BITARTRATE AND ACETAMINOPHEN 5; 325 MG/1; MG/1
1 TABLET ORAL 2 TIMES DAILY PRN
Qty: 60 TABLET | Refills: 0 | Status: SHIPPED | OUTPATIENT
Start: 2018-12-04 | End: 2019-04-12

## 2018-12-04 NOTE — TELEPHONE ENCOUNTER
Called [facility] for authorization of approval of Left lumbar paraspinal trigger point injection cpt code . Call center is now closed.  Will call in the am.

## 2018-12-04 NOTE — PATIENT INSTRUCTIONS
Call the office in one week for a status update. If needed we can either repeat the trigger point injection or the epidural steroid injection.

## 2018-12-04 NOTE — PROGRESS NOTES
HPI:    Patient ID: Evaristo Moreno is a 77year old male. He has a history of CAD and is on dual antiplatelet therapy. He presents with axial right-sided low back pain that worsens with ambulation, improves with sitting.   MRI of the lumbar spine found. No orders of the defined types were placed in this encounter.       Meds This Visit:  Requested Prescriptions      No prescriptions requested or ordered in this encounter       Imaging & Referrals:  None       #[phone]

## 2018-12-05 NOTE — TELEPHONE ENCOUNTER
Called [facility] for authorization of retro approval of Left lumbar paraspinal trigger point injection cpt code . T/t Jp Garcia who initiated request. Reference #  [id number], will fax clinical notes pending approval. Printed referral/order,clinical note.  Will i

## 2018-12-23 RX ORDER — TEMAZEPAM 30 MG/1
CAPSULE ORAL
Qty: 30 CAPSULE | Refills: 0 | OUTPATIENT
Start: 2018-12-23 | End: 2018-12-23

## 2018-12-24 RX ORDER — TEMAZEPAM 30 MG/1
CAPSULE ORAL
Qty: 30 CAPSULE | Refills: 0 | Status: SHIPPED | OUTPATIENT
Start: 2018-12-24 | End: 2019-01-23

## 2018-12-27 NOTE — TELEPHONE ENCOUNTER
Called [facility] to check on status for authorization of retro approval of Left lumbar paraspinal trigger point injection cpt code . Reference #  [id number], T/t   Neris SWANSONwho states they never received clinicals. Will ask JOE Diaz PSR to fax with above refer

## 2019-01-02 NOTE — TELEPHONE ENCOUNTER
Called [facility] to check on status for authorization of retro approval of Left lumbar paraspinal trigger point injection cpt code . Case # [id number]  T/t Destiny Simmons. who states she will send for medical review. They will advise of status after review.

## 2019-01-10 NOTE — TELEPHONE ENCOUNTER
Called [facility] to check status of TPI approval. Spoke to Yulissa 26 1-10-19 who stated the procedure has been denied. Transferred to Kassie Phan RN and confirmed that clinicals were sent. She checked for documentation and informed me that a  spoke to ANKIT BERGER

## 2019-01-21 NOTE — TELEPHONE ENCOUNTER
From: Ernesto Zambrano  Sent: 4/2/2018 8:36 AM CDT  Subject: Medication Renewal Request    Ernesto Zambrano would like a refill of the following medications:     HYDROcodone-acetaminophen 5-325 MG Oral Tab Lyle Power MD]    Preferred pharmacy: [facility] [address] [phone] OT 5C  Discharge Planner OT   Patient plan for discharge: Home  Current status: Eval complete, treatment indicated. Pt dependently transferred from wc to bed with A x2 and lift. Pt completed PROM of BUE while supine in bed. Max A for bilateral rolling in bed.  Barriers to return to prior living situation: weakness, acute medical needs, deconditioning  Recommendations for discharge: Home with current level of PCA services pending completion of a safe pivot transfer with A x1  Rationale for recommendations: Pt is near functional baseline, however would benefit from continued skilled therapy to increase independence with transfers.        Entered by: Kathryn Ventura 01/21/2019 3:56 PM

## 2019-01-23 RX ORDER — TEMAZEPAM 30 MG/1
CAPSULE ORAL
Qty: 30 CAPSULE | Refills: 0 | OUTPATIENT
Start: 2019-01-23 | End: 2019-02-19

## 2019-01-25 RX ORDER — TEMAZEPAM 30 MG/1
CAPSULE ORAL
Qty: 30 CAPSULE | Refills: 0 | OUTPATIENT
Start: 2019-01-25

## 2019-02-19 RX ORDER — TEMAZEPAM 30 MG/1
CAPSULE ORAL
Qty: 30 CAPSULE | Refills: 0 | OUTPATIENT
Start: 2019-02-19 | End: 2019-02-20

## 2019-02-19 NOTE — TELEPHONE ENCOUNTER
Okay to refill temazepam once. [facility] prescription drug monitoring website queried.   Please phone in to pharmacy

## 2019-02-20 RX ORDER — TEMAZEPAM 30 MG/1
CAPSULE ORAL
Qty: 30 CAPSULE | Refills: 0 | OUTPATIENT
Start: 2019-02-20 | End: 2019-03-21

## 2019-02-21 NOTE — TELEPHONE ENCOUNTER
Please respond to pool: EM [facility] LPN/ANTONIA-- please call in medication    Pharmacy     [id number] - [facility] [place] [phone]   Medication Detail      Disp Refills Start End    TEMAZEPAM 30 MG Oral Cap 30 capsule 0 2/20/2019     Sig: TAKE ONE CAPSULE BY MOUTH AT BEDTIME AS NEEDED FOR SLEEP     Class: Phone in

## 2019-02-28 VITALS
HEART RATE: 54 BPM | SYSTOLIC BLOOD PRESSURE: 110 MMHG | WEIGHT: 194 LBS | HEIGHT: 71 IN | BODY MASS INDEX: 27.16 KG/M2 | DIASTOLIC BLOOD PRESSURE: 60 MMHG

## 2019-02-28 VITALS
DIASTOLIC BLOOD PRESSURE: 80 MMHG | BODY MASS INDEX: 25.9 KG/M2 | SYSTOLIC BLOOD PRESSURE: 118 MMHG | OXYGEN SATURATION: 95 % | HEIGHT: 71 IN | HEART RATE: 74 BPM | WEIGHT: 185 LBS

## 2019-02-28 VITALS
HEART RATE: 62 BPM | DIASTOLIC BLOOD PRESSURE: 60 MMHG | BODY MASS INDEX: 27.72 KG/M2 | WEIGHT: 198 LBS | HEIGHT: 71 IN | SYSTOLIC BLOOD PRESSURE: 100 MMHG | OXYGEN SATURATION: 97 %

## 2019-03-01 VITALS
WEIGHT: 187 LBS | RESPIRATION RATE: 16 BRPM | SYSTOLIC BLOOD PRESSURE: 128 MMHG | DIASTOLIC BLOOD PRESSURE: 74 MMHG | HEIGHT: 71 IN | HEART RATE: 53 BPM | OXYGEN SATURATION: 96 % | BODY MASS INDEX: 26.18 KG/M2

## 2019-03-01 VITALS
SYSTOLIC BLOOD PRESSURE: 116 MMHG | HEART RATE: 59 BPM | WEIGHT: 190 LBS | DIASTOLIC BLOOD PRESSURE: 74 MMHG | BODY MASS INDEX: 26.6 KG/M2 | OXYGEN SATURATION: 98 % | HEIGHT: 71 IN

## 2019-03-18 RX ORDER — GABAPENTIN 100 MG/1
CAPSULE ORAL
COMMUNITY
Start: 2017-01-30

## 2019-03-18 RX ORDER — ATORVASTATIN CALCIUM 80 MG/1
1 TABLET, FILM COATED ORAL AT BEDTIME
COMMUNITY
Start: 2019-01-23 | End: 2019-04-17 | Stop reason: SDUPTHER

## 2019-03-18 RX ORDER — HYDROCHLOROTHIAZIDE 25 MG/1
TABLET ORAL
COMMUNITY
Start: 2019-02-04 | End: 2019-04-29 | Stop reason: SDUPTHER

## 2019-03-18 RX ORDER — METOPROLOL SUCCINATE 25 MG/1
1 TABLET, EXTENDED RELEASE ORAL DAILY
COMMUNITY
Start: 2019-01-23 | End: 2019-06-01 | Stop reason: SDUPTHER

## 2019-03-18 RX ORDER — LISINOPRIL 20 MG/1
TABLET ORAL
COMMUNITY
Start: 2018-10-25 | End: 2019-11-05 | Stop reason: SDUPTHER

## 2019-03-18 RX ORDER — CLOPIDOGREL BISULFATE 75 MG/1
1 TABLET ORAL EVERY OTHER DAY
COMMUNITY
Start: 2018-05-07 | End: 2019-07-25 | Stop reason: SDUPTHER

## 2019-03-21 RX ORDER — TEMAZEPAM 30 MG/1
CAPSULE ORAL
Qty: 30 CAPSULE | Refills: 0 | OUTPATIENT
Start: 2019-03-21 | End: 2019-04-18

## 2019-03-21 NOTE — TELEPHONE ENCOUNTER
Clinical staff please call in Rx and notify pt, thank you.  Please respond to pool: [facility] LPN/ANTONIA

## 2019-04-12 ENCOUNTER — OFFICE VISIT (OUTPATIENT)
Dept: INTERNAL MEDICINE CLINIC | Facility: CLINIC | Age: 69
End: 2019-04-12
Payer: COMMERCIAL

## 2019-04-12 VITALS
WEIGHT: 200.38 LBS | SYSTOLIC BLOOD PRESSURE: 118 MMHG | DIASTOLIC BLOOD PRESSURE: 64 MMHG | HEIGHT: 71 IN | BODY MASS INDEX: 28.05 KG/M2 | HEART RATE: 57 BPM

## 2019-04-12 DIAGNOSIS — I25.10 ASHD (ARTERIOSCLEROTIC HEART DISEASE): Primary | ICD-10-CM

## 2019-04-12 DIAGNOSIS — I10 ESSENTIAL HYPERTENSION: ICD-10-CM

## 2019-04-12 DIAGNOSIS — Z86.010 HISTORY OF COLON POLYPS: ICD-10-CM

## 2019-04-12 PROCEDURE — 99212 OFFICE O/P EST SF 10 MIN: CPT | Performed by: INTERNAL MEDICINE

## 2019-04-12 PROCEDURE — 99213 OFFICE O/P EST LOW 20 MIN: CPT | Performed by: INTERNAL MEDICINE

## 2019-04-12 NOTE — PROGRESS NOTES
Kenia Early is a 68year old male. Patient presents with: Follow - Up    HPI:   Mr. Angelica Hall presents this morning for follow-up of ASHD and hypertension. Lately he has been feeling well.   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

## 2019-04-12 NOTE — PATIENT INSTRUCTIONS
Please continue your current medications. Continue regular exercise. Return visit in 6 months. Please contact GI for repeat colonoscopy.

## 2019-04-17 RX ORDER — ATORVASTATIN CALCIUM 80 MG/1
TABLET, FILM COATED ORAL
Qty: 30 TABLET | Refills: 0 | Status: SHIPPED | OUTPATIENT
Start: 2019-04-17 | End: 2019-05-14 | Stop reason: SDUPTHER

## 2019-04-17 RX ORDER — TEMAZEPAM 30 MG/1
30 CAPSULE ORAL DAILY
Refills: 0 | COMMUNITY
Start: 2019-03-23

## 2019-04-17 RX ORDER — FAMOTIDINE 20 MG
1000 TABLET ORAL DAILY
COMMUNITY
Start: 2015-08-31

## 2019-04-18 RX ORDER — TEMAZEPAM 30 MG/1
CAPSULE ORAL
Qty: 30 CAPSULE | Refills: 0 | OUTPATIENT
Start: 2019-04-18 | End: 2019-05-20

## 2019-04-22 PROBLEM — R94.31 ABNORMAL EKG: Status: ACTIVE | Noted: 2019-04-22

## 2019-04-22 PROBLEM — I25.10 CAD (CORONARY ARTERY DISEASE): Status: ACTIVE | Noted: 2019-04-22

## 2019-04-22 PROBLEM — E78.2 HYPERLIPIDEMIA, MIXED: Status: ACTIVE | Noted: 2019-04-22

## 2019-04-22 PROBLEM — I10 BENIGN HYPERTENSION: Status: ACTIVE | Noted: 2019-04-22

## 2019-04-24 ENCOUNTER — OFFICE VISIT (OUTPATIENT)
Dept: CARDIOLOGY | Age: 69
End: 2019-04-24

## 2019-04-24 VITALS
WEIGHT: 196 LBS | BODY MASS INDEX: 27.44 KG/M2 | SYSTOLIC BLOOD PRESSURE: 102 MMHG | OXYGEN SATURATION: 93 % | DIASTOLIC BLOOD PRESSURE: 62 MMHG | HEIGHT: 71 IN | HEART RATE: 63 BPM

## 2019-04-24 DIAGNOSIS — R94.31 ABNORMAL EKG: ICD-10-CM

## 2019-04-24 DIAGNOSIS — I10 BENIGN HYPERTENSION: ICD-10-CM

## 2019-04-24 DIAGNOSIS — I25.10 CORONARY ARTERY DISEASE INVOLVING NATIVE CORONARY ARTERY OF NATIVE HEART WITHOUT ANGINA PECTORIS: Primary | ICD-10-CM

## 2019-04-24 DIAGNOSIS — E78.2 HYPERLIPIDEMIA, MIXED: ICD-10-CM

## 2019-04-24 PROCEDURE — 3078F DIAST BP <80 MM HG: CPT | Performed by: INTERNAL MEDICINE

## 2019-04-24 PROCEDURE — 3074F SYST BP LT 130 MM HG: CPT | Performed by: INTERNAL MEDICINE

## 2019-04-24 PROCEDURE — 99214 OFFICE O/P EST MOD 30 MIN: CPT | Performed by: INTERNAL MEDICINE

## 2019-04-29 RX ORDER — GABAPENTIN 100 MG/1
CAPSULE ORAL
Qty: 120 CAPSULE | Refills: 5 | Status: SHIPPED | OUTPATIENT
Start: 2019-04-29 | End: 2019-11-05

## 2019-04-30 RX ORDER — HYDROCHLOROTHIAZIDE 25 MG/1
TABLET ORAL
Qty: 30 TABLET | Refills: 11 | Status: SHIPPED | OUTPATIENT
Start: 2019-04-30 | End: 2020-04-01 | Stop reason: SDUPTHER

## 2019-05-14 RX ORDER — ATORVASTATIN CALCIUM 80 MG/1
TABLET, FILM COATED ORAL
Qty: 90 TABLET | Refills: 3 | Status: SHIPPED | OUTPATIENT
Start: 2019-05-14 | End: 2020-04-01 | Stop reason: SDUPTHER

## 2019-05-20 RX ORDER — TEMAZEPAM 30 MG/1
CAPSULE ORAL
Qty: 30 CAPSULE | Refills: 0 | OUTPATIENT
Start: 2019-05-20 | End: 2019-06-17

## 2019-05-20 NOTE — TELEPHONE ENCOUNTER
Review pended refill request as it does not fall under a protocol.     Last Rx: 4/18/19 #30    Refill Protocol Appointment Criteria  · Appointment scheduled in the past 6 months or in the next 3 months  Recent Outpatient Visits            1 month ago ANTONIO Portillo

## 2019-05-30 ENCOUNTER — LAB ENCOUNTER (OUTPATIENT)
Dept: LAB | Facility: HOSPITAL | Age: 69
End: 2019-05-30
Attending: INTERNAL MEDICINE
Payer: COMMERCIAL

## 2019-05-30 DIAGNOSIS — E78.00 PURE HYPERCHOLESTEROLEMIA: Primary | ICD-10-CM

## 2019-05-30 LAB
ALBUMIN SERPL-MCNC: 3.8 G/DL
ALBUMIN/GLOB SERPL: NORMAL {RATIO}
ALP SERPL-CCNC: 122 U/L
ALT SERPL-CCNC: 16 U/L
ANION GAP SERPL CALC-SCNC: NORMAL MMOL/L
AST SERPL-CCNC: 22 U/L
BILIRUB SERPL-MCNC: 0.6 MG/DL
BUN SERPL-MCNC: 22 MG/DL
BUN/CREAT SERPL: NORMAL
CALCIUM SERPL-MCNC: 9.5 MG/DL
CHLORIDE SERPL-SCNC: 103 MMOL/L
CHOLEST SERPL-MCNC: 112 MG/DL
CHOLEST/HDLC SERPL: NORMAL {RATIO}
CO2 SERPL-SCNC: NORMAL MMOL/L
CREAT SERPL-MCNC: 1.28 MG/DL
GLOBULIN SER-MCNC: 3.4 G/DL
GLUCOSE SERPL-MCNC: 95 MG/DL
HDLC SERPL-MCNC: 41 MG/DL
LDLC SERPL CALC-MCNC: 51 MG/DL
LENGTH OF FAST TIME PATIENT: NORMAL H
LENGTH OF FAST TIME PATIENT: NORMAL H
NONHDLC SERPL-MCNC: NORMAL MG/DL
POTASSIUM SERPL-SCNC: 3.7 MMOL/L
PROT SERPL-MCNC: 7.2 G/DL
SODIUM SERPL-SCNC: 138 MMOL/L
TRIGL SERPL-MCNC: 100 MG/DL
VLDLC SERPL CALC-MCNC: NORMAL MG/DL

## 2019-05-30 PROCEDURE — 36415 COLL VENOUS BLD VENIPUNCTURE: CPT

## 2019-05-30 PROCEDURE — 80053 COMPREHEN METABOLIC PANEL: CPT

## 2019-05-30 PROCEDURE — 80061 LIPID PANEL: CPT

## 2019-05-31 ENCOUNTER — CLINICAL ABSTRACT (OUTPATIENT)
Dept: CARDIOLOGY | Age: 69
End: 2019-05-31

## 2019-06-03 ENCOUNTER — TELEPHONE (OUTPATIENT)
Dept: CARDIOLOGY | Age: 69
End: 2019-06-03

## 2019-06-03 RX ORDER — METOPROLOL SUCCINATE 25 MG/1
TABLET, EXTENDED RELEASE ORAL
Qty: 30 TABLET | Refills: 6 | Status: SHIPPED | OUTPATIENT
Start: 2019-06-03 | End: 2020-01-06 | Stop reason: SDUPTHER

## 2019-06-18 RX ORDER — TEMAZEPAM 30 MG/1
CAPSULE ORAL
Qty: 30 CAPSULE | Refills: 0 | OUTPATIENT
Start: 2019-06-18 | End: 2019-07-15

## 2019-06-18 NOTE — TELEPHONE ENCOUNTER
Review pended refill request as it does not fall under a protocol.     Last Rx: 5/20/19 #30  Requested Prescriptions     Pending Prescriptions Disp Refills   • TEMAZEPAM 30 MG Oral Cap [Pharmacy Med Name: Temazepam 30 Mg Cap Asce] 30 capsule 0     Sig: TAKE

## 2019-06-20 RX ORDER — TEMAZEPAM 30 MG/1
CAPSULE ORAL
Qty: 30 CAPSULE | Refills: 0 | OUTPATIENT
Start: 2019-06-20

## 2019-07-16 RX ORDER — TEMAZEPAM 30 MG/1
CAPSULE ORAL
Qty: 30 CAPSULE | Refills: 0 | OUTPATIENT
Start: 2019-07-16 | End: 2019-08-14

## 2019-07-16 NOTE — TELEPHONE ENCOUNTER
Controlled medications pending for review. If approved needs to be called in or faxed by on site staff.     Last Rx: 6-18-19 # 30  LOV: 4-12-19    Requested Prescriptions     Pending Prescriptions Disp Refills   • TEMAZEPAM 30 MG Oral Cap [Pharmacy Med Name

## 2019-07-16 NOTE — TELEPHONE ENCOUNTER
Spoke to [place] pharmacist and gave verbal order to fill refill as authorized.   She verbalized understanding

## 2019-07-25 ENCOUNTER — TELEPHONE (OUTPATIENT)
Dept: CARDIOLOGY | Age: 69
End: 2019-07-25

## 2019-07-25 RX ORDER — CLOPIDOGREL BISULFATE 75 MG/1
75 TABLET ORAL EVERY OTHER DAY
Qty: 16 TABLET | Refills: 8 | Status: SHIPPED | OUTPATIENT
Start: 2019-07-25 | End: 2020-04-10

## 2019-08-14 RX ORDER — TEMAZEPAM 30 MG/1
CAPSULE ORAL
Qty: 30 CAPSULE | Refills: 0 | OUTPATIENT
Start: 2019-08-14 | End: 2019-09-09

## 2019-08-14 NOTE — TELEPHONE ENCOUNTER
Review pended refill request as it does not fall under a protocol.     Last Rx: 7/16/19 #30  Requested Prescriptions     Pending Prescriptions Disp Refills   • TEMAZEPAM 30 MG Oral Cap [Pharmacy Med Name: Temazepam 30 Mg Cap Asce] 30 capsule 0     Sig: TAKE

## 2019-09-10 RX ORDER — TEMAZEPAM 30 MG/1
CAPSULE ORAL
Qty: 30 CAPSULE | Refills: 0 | OUTPATIENT
Start: 2019-09-10 | End: 2019-10-09

## 2019-09-10 NOTE — TELEPHONE ENCOUNTER
Controlled medications pending for review. If approved needs to be called in or faxed by on site staff.     Last Rx: 8-14-19 # 30  LOV: 4-12-19    Requested Prescriptions     Pending Prescriptions Disp Refills   • TEMAZEPAM 30 MG Oral Cap [Pharmacy Med Name

## 2019-10-10 RX ORDER — TEMAZEPAM 30 MG/1
CAPSULE ORAL
Qty: 30 CAPSULE | Refills: 0 | OUTPATIENT
Start: 2019-10-10 | End: 2019-11-08

## 2019-10-10 NOTE — TELEPHONE ENCOUNTER
Controlled medication pending for review. Please change to phone in, fax, or print script if not being sent electronically.     Requested Prescriptions     Pending Prescriptions Disp Refills   • TEMAZEPAM 30 MG Oral Cap [Pharmacy Med Name: Temazepam 30 Mg

## 2019-11-05 RX ORDER — LISINOPRIL 20 MG/1
TABLET ORAL
Qty: 30 TABLET | Refills: 2 | Status: SHIPPED | OUTPATIENT
Start: 2019-11-05 | End: 2020-02-05 | Stop reason: SDUPTHER

## 2019-11-06 RX ORDER — GABAPENTIN 100 MG/1
CAPSULE ORAL
Qty: 120 CAPSULE | Refills: 3 | Status: SHIPPED | OUTPATIENT
Start: 2019-11-06 | End: 2020-03-07

## 2019-11-06 NOTE — TELEPHONE ENCOUNTER
Gabapentin 100 mg PASSED Neurology protocol. Rx filled per protocol.      Refill Protocol Appointment Criteria  · Appointment scheduled in the past 6 months or in the next 3 months  Recent Outpatient Visits            6 months ago ASHD (arteriosclerotic hea

## 2019-11-08 RX ORDER — TEMAZEPAM 30 MG/1
CAPSULE ORAL
Qty: 30 CAPSULE | Refills: 0 | OUTPATIENT
Start: 2019-11-08 | End: 2019-12-10

## 2019-11-08 NOTE — TELEPHONE ENCOUNTER
Okay to refill temazepam once. [facility] prescription drug monitoring website queried. Please phone in.

## 2019-11-08 NOTE — TELEPHONE ENCOUNTER
Controlled medication pending for review. Please change to phone in, fax, or print script if not being sent electronically.     Last Rx: 10/10/19  LOV: 4/12/19    Requested Prescriptions   Pending Prescriptions Disp Refills   • TEMAZEPAM 30 MG Oral Cap [Ph

## 2019-11-18 ENCOUNTER — OFFICE VISIT (OUTPATIENT)
Dept: INTERNAL MEDICINE CLINIC | Facility: CLINIC | Age: 69
End: 2019-11-18

## 2019-11-18 VITALS
DIASTOLIC BLOOD PRESSURE: 78 MMHG | BODY MASS INDEX: 26.83 KG/M2 | HEIGHT: 71 IN | WEIGHT: 191.63 LBS | SYSTOLIC BLOOD PRESSURE: 132 MMHG | HEART RATE: 52 BPM

## 2019-11-18 DIAGNOSIS — I70.0 ATHEROSCLEROSIS OF AORTA (HCC): ICD-10-CM

## 2019-11-18 DIAGNOSIS — E78.00 HYPERCHOLESTEROLEMIA: ICD-10-CM

## 2019-11-18 DIAGNOSIS — I25.10 ASHD (ARTERIOSCLEROTIC HEART DISEASE): Primary | ICD-10-CM

## 2019-11-18 DIAGNOSIS — Z86.010 HISTORY OF COLON POLYPS: ICD-10-CM

## 2019-11-18 DIAGNOSIS — I10 ESSENTIAL HYPERTENSION: ICD-10-CM

## 2019-11-18 DIAGNOSIS — I25.5 ISCHEMIC CARDIOMYOPATHY: ICD-10-CM

## 2019-11-18 PROCEDURE — 99214 OFFICE O/P EST MOD 30 MIN: CPT | Performed by: INTERNAL MEDICINE

## 2019-11-18 NOTE — H&P
Moira Schwarz is a 68year old male who presents this afternoon for follow-up of ASHD, ischemic cardiomyopathy, hypertension, hypercholesterolemia and prior colon polyps.   He is also due for his annual exam.  HPI:   Currently he is without medical Take 1 tablet by mouth daily. 3   • lisinopril 20 MG Oral Tab Take 20 mg by mouth daily. 5   • Metoprolol Succinate ER (TOPROL XL) 25 MG Oral Tablet 24 Hr Take 25 mg by mouth daily.     3   • Cholecalciferol (VITAMIN D) 1000 UNITS Oral Cap Take 1 capsule palpitations or chest pain  GI: No anorexia heartburn dysphagia nausea vomiting abdominal pain diarrhea constipation or rectal bleeding  : No urinary frequency dysuria or hematuria  MUSCULOSKELETAL: Chronic intermittent low back pain with certain activit

## 2019-11-18 NOTE — PATIENT INSTRUCTIONS
Continue current medications. You should get a flu shot soon. You are overdue for colonoscopy. Continue follow-up with Dr. Norton Overall. Return visit in 6 months.

## 2019-12-02 ENCOUNTER — OFFICE VISIT (OUTPATIENT)
Dept: CARDIOLOGY | Age: 69
End: 2019-12-02

## 2019-12-02 VITALS
SYSTOLIC BLOOD PRESSURE: 104 MMHG | DIASTOLIC BLOOD PRESSURE: 56 MMHG | HEART RATE: 55 BPM | HEIGHT: 71 IN | WEIGHT: 190 LBS | BODY MASS INDEX: 26.6 KG/M2 | OXYGEN SATURATION: 97 %

## 2019-12-02 DIAGNOSIS — I25.10 CORONARY ARTERY DISEASE INVOLVING NATIVE CORONARY ARTERY OF NATIVE HEART WITHOUT ANGINA PECTORIS: Primary | ICD-10-CM

## 2019-12-02 DIAGNOSIS — R94.31 ABNORMAL EKG: ICD-10-CM

## 2019-12-02 DIAGNOSIS — I10 BENIGN HYPERTENSION: ICD-10-CM

## 2019-12-02 DIAGNOSIS — E78.2 HYPERLIPIDEMIA, MIXED: ICD-10-CM

## 2019-12-02 PROCEDURE — 3074F SYST BP LT 130 MM HG: CPT | Performed by: INTERNAL MEDICINE

## 2019-12-02 PROCEDURE — 99214 OFFICE O/P EST MOD 30 MIN: CPT | Performed by: INTERNAL MEDICINE

## 2019-12-02 PROCEDURE — 3078F DIAST BP <80 MM HG: CPT | Performed by: INTERNAL MEDICINE

## 2019-12-02 SDOH — HEALTH STABILITY: MENTAL HEALTH: HOW MANY STANDARD DRINKS CONTAINING ALCOHOL DO YOU HAVE ON A TYPICAL DAY?: 1 OR 2

## 2019-12-02 SDOH — HEALTH STABILITY: MENTAL HEALTH: HOW OFTEN DO YOU HAVE A DRINK CONTAINING ALCOHOL?: MONTHLY OR LESS

## 2019-12-02 SDOH — HEALTH STABILITY: MENTAL HEALTH: HOW OFTEN DO YOU HAVE 6 OR MORE DRINKS ON ONE OCCASION?: NEVER

## 2019-12-02 ASSESSMENT — PATIENT HEALTH QUESTIONNAIRE - PHQ9
1. LITTLE INTEREST OR PLEASURE IN DOING THINGS: NOT AT ALL
SUM OF ALL RESPONSES TO PHQ9 QUESTIONS 1 AND 2: 0
2. FEELING DOWN, DEPRESSED OR HOPELESS: NOT AT ALL
SUM OF ALL RESPONSES TO PHQ9 QUESTIONS 1 AND 2: 0

## 2019-12-10 RX ORDER — TEMAZEPAM 30 MG/1
CAPSULE ORAL
Qty: 30 CAPSULE | Refills: 0 | OUTPATIENT
Start: 2019-12-10 | End: 2020-01-07

## 2019-12-10 NOTE — TELEPHONE ENCOUNTER
Okay to refill temazepam once. [facility] prescription drug monitoring website queried.   Please call in

## 2019-12-10 NOTE — TELEPHONE ENCOUNTER
Controlled medication pending for review. Please change to phone in, fax, or print script if not being sent electronically.     Last Rx: 11/08/19  LOV: 11/18/19

## 2020-01-06 RX ORDER — METOPROLOL SUCCINATE 25 MG/1
TABLET, EXTENDED RELEASE ORAL
Qty: 30 TABLET | Refills: 11 | Status: SHIPPED | OUTPATIENT
Start: 2020-01-06 | End: 2020-04-01 | Stop reason: SDUPTHER

## 2020-01-08 RX ORDER — TEMAZEPAM 30 MG/1
CAPSULE ORAL
Qty: 30 CAPSULE | Refills: 0 | OUTPATIENT
Start: 2020-01-08 | End: 2020-02-09

## 2020-02-05 RX ORDER — LISINOPRIL 20 MG/1
TABLET ORAL
Qty: 30 TABLET | Refills: 0 | Status: SHIPPED | OUTPATIENT
Start: 2020-02-05 | End: 2020-02-10 | Stop reason: SDUPTHER

## 2020-02-09 NOTE — TELEPHONE ENCOUNTER
Review pended refill request as it does not fall under a protocol.     Last Rx: 1/8/20  LOV: 2 months ago

## 2020-02-10 RX ORDER — TEMAZEPAM 30 MG/1
CAPSULE ORAL
Qty: 30 CAPSULE | Refills: 0 | OUTPATIENT
Start: 2020-02-10 | End: 2020-03-11

## 2020-02-10 RX ORDER — LISINOPRIL 20 MG/1
20 TABLET ORAL DAILY
Qty: 30 TABLET | Refills: 3 | Status: SHIPPED | OUTPATIENT
Start: 2020-02-10 | End: 2020-04-01 | Stop reason: SDUPTHER

## 2020-02-11 RX ORDER — TEMAZEPAM 30 MG/1
CAPSULE ORAL
Qty: 30 CAPSULE | Refills: 0 | OUTPATIENT
Start: 2020-02-11

## 2020-03-07 RX ORDER — GABAPENTIN 100 MG/1
CAPSULE ORAL
Qty: 120 CAPSULE | Refills: 0 | Status: SHIPPED | OUTPATIENT
Start: 2020-03-07 | End: 2020-04-02

## 2020-03-07 NOTE — TELEPHONE ENCOUNTER
Refill passed per [facility] [place] protocol.     Refill Protocol Appointment Criteria  · Appointment scheduled in the past 6 months or in the next 3 months  Recent Outpatient Visits            3 months ago ASHD (arteriosclerotic heart disease)    Zohreh Rascon

## 2020-03-11 RX ORDER — TEMAZEPAM 30 MG/1
CAPSULE ORAL
Qty: 30 CAPSULE | Refills: 0 | OUTPATIENT
Start: 2020-03-11

## 2020-03-11 RX ORDER — TEMAZEPAM 30 MG/1
CAPSULE ORAL
Qty: 30 CAPSULE | Refills: 0 | OUTPATIENT
Start: 2020-03-11 | End: 2020-04-04

## 2020-03-11 NOTE — TELEPHONE ENCOUNTER
Rx Temazepam phoned in to [facility] spoke to [facility] pharmacist.

## 2020-03-11 NOTE — TELEPHONE ENCOUNTER
· Please advise on refill. Thanks.     Last rx:  TEMAZEPAM 30 MG Oral Cap 30 capsule 0 2/10/2020    Sig:   TAKE ONE CAPSULE BY MOUTH AT BEDTIME AS NEEDED FOR SLEEP         Recent Outpatient Visits            3 months ago ASHD (arteriosclerotic heart disease

## 2020-04-01 ENCOUNTER — TELEPHONE (OUTPATIENT)
Dept: CARDIOLOGY | Age: 70
End: 2020-04-01

## 2020-04-01 RX ORDER — METOPROLOL SUCCINATE 25 MG/1
25 TABLET, EXTENDED RELEASE ORAL DAILY
Qty: 90 TABLET | Refills: 3 | Status: SHIPPED | OUTPATIENT
Start: 2020-04-01 | End: 2020-12-23

## 2020-04-01 RX ORDER — ATORVASTATIN CALCIUM 80 MG/1
80 TABLET, FILM COATED ORAL AT BEDTIME
Qty: 90 TABLET | Refills: 3 | Status: SHIPPED | OUTPATIENT
Start: 2020-04-01 | End: 2020-04-28

## 2020-04-01 RX ORDER — HYDROCHLOROTHIAZIDE 25 MG/1
25 TABLET ORAL DAILY
Qty: 90 TABLET | Refills: 3 | Status: SHIPPED | OUTPATIENT
Start: 2020-04-01 | End: 2020-04-28

## 2020-04-01 RX ORDER — LISINOPRIL 20 MG/1
20 TABLET ORAL DAILY
Qty: 90 TABLET | Refills: 3 | Status: SHIPPED | OUTPATIENT
Start: 2020-04-01 | End: 2020-06-30

## 2020-04-02 RX ORDER — GABAPENTIN 100 MG/1
CAPSULE ORAL
Qty: 120 CAPSULE | Refills: 5 | Status: SHIPPED | OUTPATIENT
Start: 2020-04-02 | End: 2020-10-01

## 2020-04-04 RX ORDER — TEMAZEPAM 30 MG/1
30 CAPSULE ORAL NIGHTLY PRN
Qty: 30 CAPSULE | Refills: 0 | OUTPATIENT
Start: 2020-04-04 | End: 2020-05-06

## 2020-04-06 NOTE — TELEPHONE ENCOUNTER
SUBJECTIVE:   Amanda Richardson is a 53 year old female who presents to clinic today for the following health issues:    Medication Followup - MS - Chronic pain - lumbar stenosis - stable    Taking Medication as prescribed: yes    Side Effects:  None    Medication Helping Symptoms:  Yes    Elavil/gabapentin/baclofen/cymblata/losartan-hctz/metoprolol/oxycodone     Gabapentin 600 mg three times daily.     HX T Cell Lymphoma -  No issues    Prediabetes    Lab Results   Component Value Date    A1C 6.5 10/02/2017    A1C 6.6 05/02/2016     Glucose   Date Value Ref Range Status   09/28/2017 150 (H) 70 - 99 mg/dL Final     Htn    BP Readings from Last 3 Encounters:   06/13/18 124/64   02/12/18 134/72   11/01/17 110/62     Creatinine   Date Value Ref Range Status   09/28/2017 0.59 0.52 - 1.04 mg/dL Final     Depression -  No issues    Tobacco Use -- Amanda is working on tobacco cessation - still smoking some. She thinks she has smoked about 1 pack a day for ~30 years.     Past/recent records reviewed and discussed for -- medications, allergies.      Problem list and histories reviewed & adjusted, as indicated.  Additional history: as documented    Reviewed and updated as needed this visit by clinical staff  Tobacco  Allergies  Meds  Med Hx  Surg Hx  Fam Hx  Soc Hx      Reviewed and updated as needed this visit by Provider       Wt Readings from Last 4 Encounters:   06/13/18 260 lb (117.9 kg)   02/12/18 260 lb (117.9 kg)   11/01/17 250 lb (113.4 kg)   10/17/17 250 lb (113.4 kg)       Health Maintenance    Health Maintenance Due   Topic Date Due     FIT Q1 YR  05/08/1973     DEPRESSION ACTION PLAN Q1 YR  05/08/1981     HIV SCREEN (SYSTEM ASSIGNED)  05/08/1981     HEPATITIS C SCREENING  05/08/1981     LIPID MONITORING Q1 YEAR  05/02/2017     WELLNESS VISIT Q1 YR  05/02/2017     DEXA Q5 YR  06/26/2017     MAMMO Q1 YR  08/16/2017       Current Problem List    Patient Active Problem List   Diagnosis     MS (multiple  RX called into pharm and talked to Charisma Neville sclerosis) (H)     Non Hodgkin's lymphoma (H)     Leukocytosis     Gallstone     Hypertension goal BP (blood pressure) < 140/90     Spinal stenosis, lumbar     Lumbago     Abnormality of gait     Nonallopathic lesion of thoracic region     Nonallopathic lesion of cervical region     Pain medication agreement- signed Nov 20,2012     Lumbar radiculopathy     Colon polyps     Neuralgia, neuritis, and radiculitis, unspecified     Encounter for long-term current use of medication     Health Care Home     Moderate depressive episode (H)     Prediabetes     Leg muscle spasm     Chronic pain syndrome     Controlled substance agreement signed     Severe obesity with body mass index (BMI) of 35.0 to 35.9 and comorbidity (H)     T-cell lymphoma (H)     Tobacco abuse       Past Medical History    Past Medical History:   Diagnosis Date     Abnormality of gait 7/27/2012     CARDIOVASCULAR SCREENING; LDL GOAL LESS THAN 100      Chronic pain     [facility] - yearly, next in summer 2018     Colon polyps 1/15    tubular adenomas x 2     Gallstone 6/11/2012     Hypertension goal BP (blood pressure) < 140/90      Leukocytosis 6/11/2012     Moderate depressive episode (H)      MS (multiple sclerosis) (H) 2003    Dr Vigil - [facility]     Non Hodgkin's lymphoma (H) 06/11/2012    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 - Dr Erickson - Stage IA - CD20 negative     Tobacco abuse 06/11/2012    former       Past Surgical History    Past Surgical  History:   Procedure Laterality Date     COLONOSCOPY N/A 1/7/2015    tubular adenomas x 2 - due 5 yrs     FUSION LUMBAR ANTERIOR, FUSION LUMBAR POSTERIOR TWO LEVELS, COMBINED  10/17/2013    lumbar fusion - Dr Floyd     INSERT PUMP BACLOFEN  04/2017    intrathecal baclofen pump implantation     IRRIGATION AND DEBRIDEMENT LOWER EXTREMITY, COMBINED Right 2015    Right ankle I&D d/t infection     OPEN REDUCTION INTERNAL FIXATION ANKLE  5/15    Right Bimalleolar ankle fx ORIF     OPEN REDUCTION INTERNAL FIXATION ANKLE Right 11/2015    Revision due to spasms pulling screws out of ankle     SINUS SURGERY  2011    Non hodgkins lymphoma - T cell - left nasal sinus     XR LUMBAR EPIDURAL INJECTION INCL IMAGING  3/14    Left L4-5 Epidural Dr Winter       Current Medications    Current Outpatient Prescriptions   Medication Sig Dispense Refill     amitriptyline (ELAVIL) 100 MG tablet Take 1 tablet (100 mg) by mouth At Bedtime 90 tablet 3     ASPIRIN PO Take 162 mg by mouth daily       baclofen (LIORESAL) 10 MG tablet Take 1 tablet (10 mg) by mouth 3 times daily May take an additional 10 mg as needed qd for total of 40 mg per day 120 tablet 1     Cholecalciferol (VITAMIN D PO) Take 1 tablet by mouth daily 1000 IU daily       DULoxetine (CYMBALTA) 30 MG EC capsule Take 2 capsules (60 mg) by mouth 2 times daily 360 capsule 3     gabapentin (NEURONTIN) 600 MG tablet TAKE ONE TABLET BY MOUTH THREE TIMES DAILY AND MAY TAKE ONE ADDITIONAL AS NEEDED 270 tablet 1     losartan-hydrochlorothiazide (HYZAAR) 100-25 MG per tablet TAKE ONE TABLET BY MOUTH ONCE DAILY 90 tablet 3     metoprolol succinate (TOPROL-XL) 50 MG 24 hr tablet TAKE ONE TABLET BY MOUTH ONCE DAILY 90 tablet 0     Multiple Vitamin (MULTI-VITAMIN PO) Take 1 tablet by mouth daily        omega-3 fatty acids (FISH OIL) 1200 MG capsule Take 1 capsule by mouth daily.       oxyCODONE IR (ROXICODONE) 15 MG tablet Take 0.5-1 tablets (7.5-15 mg) by mouth every 4 hours as  needed for moderate to severe pain Limit 4 tablet(s) per day. 120 tablet 0       Allergies    Allergies   Allergen Reactions     Lisinopril      Lip swelling     Cyclobenzaprine Other (See Comments)     Per 4-10-17 H&P by Yanna Dugan PA-C.     Flexeril [Cyclobenzaprine Hcl]      Got confused        Immunizations    Immunization History   Administered Date(s) Administered     Influenza (H1N1) 12/10/2009     Influenza (IIV3) PF 12/10/2009, 11/06/2012, 12/08/2014     Influenza Vaccine IM 3yrs+ 4 Valent IIV4 10/21/2013     Influenza Vaccine, 3 YRS +, IM (QUADRIVALENT W/PRESERVATIVES) 09/28/2017     Pneumococcal 23 valent 01/05/2011     Tdap (Adacel,Boostrix) 09/17/2008       Family History    Family History   Problem Relation Age of Onset     C.A.D. Father      with CHF      CANCER Father      ? unsure type - abdominal      Hypertension Mother      Thyroid Disease Mother      goiter      Breast Cancer Sister 58     Thyroid Disease Son      Cancer - colorectal No family hx of        Social History    Social History     Social History     Marital status:      Spouse name: Fernando     Number of children: 3     Years of education: 14     Occupational History      Unemployed     Social History Main Topics     Smoking status: Current Every Day Smoker     Packs/day: 0.50     Types: Cigarettes     Last attempt to quit: 8/4/2013     Smokeless tobacco: Never Used      Comment: since age 17     Alcohol use No     Drug use: No     Sexual activity: Yes     Partners: Male     Other Topics Concern     Parent/Sibling W/ Cabg, Mi Or Angioplasty Before 65f 55m? No     Caffeine Concern Yes     occas     Exercise Yes     as able     Seat Belt Yes     Social History Narrative       All above reviewed and updated, all stable unless otherwise noted    Recent labs reviewed    ROS:  Constitutional, HEENT, cardiovascular, pulmonary, GI, , musculoskeletal, neuro, skin, endocrine and psych systems are negative, except as in HPI or  "otherwise noted     This document serves as a record of the services and decisions personally performed and made by Julius Hassan MD [facility]. It was created on their behalf by Malick Lopez, a trained medical scribe. The creation of this document is based the provider's statements to the medical scribe.  Malick Lopez June 13, 2018 4:48 PM      OBJECTIVE:                                                    /64  Pulse 85  Temp 97.7  F (36.5  C) (Oral)  Ht 5' 8.5\" (1.74 m)  Wt 260 lb (117.9 kg)  LMP 12/11/2011  SpO2 94%  BMI 38.96 kg/m2  Body mass index is 38.96 kg/(m^2).  GENERAL: healthy, alert and no distress, morbidly obese, pt in wheelchair  RESP: lungs clear to auscultation - no rales, no rhonchi, no wheezes  CV: regular rates and rhythm, normal S1 S2, no S3 or S4 and no murmur, no click or rub -  SKIN: no suspicious lesions, no rashes to visible skin  NEURO: mentation intact and speech normal  PSYCH: affect normal    DIAGNOSTICS/PROCEDURES:                                                      No results found for this or any previous visit (from the past 24 hour(s)).     ASSESSMENT/PLAN:                                                        ICD-10-CM    1. MS (multiple sclerosis) (H) G35 Comprehensive metabolic panel     CBC with platelets     PHYSICAL THERAPY REFERRAL     OCCUPATIONAL THERAPY REFERRAL   2. Chronic pain of both lower extremities M79.604 baclofen (LIORESAL) 10 MG tablet    M79.605 Comprehensive metabolic panel    G89.29 CBC with platelets   3. Abnormality of gait R26.9 Comprehensive metabolic panel     CBC with platelets     PHYSICAL THERAPY REFERRAL     OCCUPATIONAL THERAPY REFERRAL   4. Other specified type of non-Hodgkin lymphoma of head (H) C85.81 Comprehensive metabolic panel     CBC with platelets   5. T-cell lymphoma (H) C85.90 Comprehensive metabolic panel     CBC with platelets   6. Prediabetes R73.03 Comprehensive metabolic panel     Lipid panel reflex to direct LDL Fasting     " Hemoglobin A1c     Albumin Random Urine Quantitative with Creat Ratio     *UA reflex to Microscopic and Culture ([facility] (except [place] and [place])   7. Spinal stenosis of lumbar region, unspecified whether neurogenic claudication present M48.061    8. Hypertension goal BP (blood pressure) < 140/90 I10    9. Moderate depressive episode (H) F32.1    10. Severe obesity with body mass index (BMI) of 35.0 to 35.9 and comorbidity (H) E66.01     Z68.35    11. Tobacco abuse Z72.0    12. Medication monitoring encounter Z51.81 Comprehensive metabolic panel     Lipid panel reflex to direct LDL Fasting     CK total     CBC with platelets     TSH with free T4 reflex     Hemoglobin A1c     Albumin Random Urine Quantitative with Creat Ratio     *UA reflex to Microscopic and Culture ([facility] (except [place] and [place])     Drug  Screen Comprehensive , Urine with Reported Meds ([facility]) (Pain Care Package)   13. Screen for colon cancer Z12.11 Fecal colorectal cancer screen (FIT)   14. Screening for breast cancer Z12.31 *MA Screening Digital Bilateral   15. Screening for osteoporosis Z13.820 DX Hip/Pelvis/Spine   16. Need for hepatitis C screening test Z11.59 Hepatitis C Screen Reflex to HCV RNA Quant and Genotype   17. Screening for human immunodeficiency virus Z11.4 **HIV Antigen Antibody Combo FUTURE anytime     Discussed treatment/modality options, including risk and benefits, she desires further health care maintenance, further lab(s), further imaging (Mammo and DEXA), medication refill(s), OTC meds, referrals (OT), and observation. All diagnosis above reviewed and noted above, otherwise stable.  See [facility] orders for further details.      Return in about 3 months (around 9/13/2018), or if symptoms worsen or fail to improve, for Lab Work, Med Check.    Health Maintenance Due   Topic Date Due     FIT Q1 YR  05/08/1973     DEPRESSION ACTION PLAN Q1 YR  05/08/1981     HIV SCREEN  (SYSTEM ASSIGNED)  05/08/1981     HEPATITIS C SCREENING  05/08/1981     LIPID MONITORING Q1 YEAR  05/02/2017     WELLNESS VISIT Q1 YR  05/02/2017     DEXA Q5 YR  06/26/2017     MAMMO Q1 YR  08/16/2017     Patient Instructions     Follow up for lab-only fasting appointment, when able     Follow up with Occupational Therapy to be fitted for a 4-wheeled mobility scooter     Follow up for Mammogram and DEXA bone density scan, as discussed    The information in this document, created by the medical scribe for me, accurately reflects the services I personally performed and the decisions made by me. I have reviewed and approved this document for accuracy.   Julius Hassan MD [facility]            Julius Hassan MD [address]  [id number] [phone] Fax

## 2020-04-10 RX ORDER — CLOPIDOGREL BISULFATE 75 MG/1
75 TABLET ORAL EVERY OTHER DAY
Qty: 16 TABLET | Refills: 3 | Status: SHIPPED | OUTPATIENT
Start: 2020-04-10 | End: 2020-08-03

## 2020-04-28 RX ORDER — HYDROCHLOROTHIAZIDE 25 MG/1
TABLET ORAL
Qty: 90 TABLET | Refills: 0 | Status: SHIPPED | OUTPATIENT
Start: 2020-04-28 | End: 2020-08-04

## 2020-04-28 RX ORDER — ATORVASTATIN CALCIUM 80 MG/1
TABLET, FILM COATED ORAL
Qty: 90 TABLET | Refills: 0 | Status: SHIPPED | OUTPATIENT
Start: 2020-04-28 | End: 2020-08-06

## 2020-05-06 RX ORDER — TEMAZEPAM 30 MG/1
CAPSULE ORAL
Qty: 30 CAPSULE | Refills: 1 | OUTPATIENT
Start: 2020-05-06 | End: 2020-07-06

## 2020-06-30 RX ORDER — LISINOPRIL 20 MG/1
TABLET ORAL
Qty: 30 TABLET | Refills: 0 | Status: SHIPPED | OUTPATIENT
Start: 2020-06-30 | End: 2020-07-07

## 2020-07-05 DIAGNOSIS — I25.10 CORONARY ARTERY DISEASE INVOLVING NATIVE CORONARY ARTERY OF NATIVE HEART WITHOUT ANGINA PECTORIS: Primary | ICD-10-CM

## 2020-07-05 DIAGNOSIS — I10 BENIGN HYPERTENSION: ICD-10-CM

## 2020-07-06 RX ORDER — TEMAZEPAM 30 MG/1
CAPSULE ORAL
Qty: 30 CAPSULE | Refills: 0 | OUTPATIENT
Start: 2020-07-06

## 2020-07-06 RX ORDER — TEMAZEPAM 30 MG/1
CAPSULE ORAL
Qty: 30 CAPSULE | Refills: 0 | OUTPATIENT
Start: 2020-07-06 | End: 2020-08-05

## 2020-07-06 NOTE — TELEPHONE ENCOUNTER
Temazepam called into the [address] as ordered. Left message to call back please transfer to triage. A mychart message was also sent. Call center please assist in scheduling the appointment. Thank You.

## 2020-07-07 RX ORDER — LISINOPRIL 20 MG/1
20 TABLET ORAL DAILY
Qty: 30 TABLET | Refills: 0 | Status: SHIPPED | OUTPATIENT
Start: 2020-07-07 | End: 2020-08-03

## 2020-07-13 ENCOUNTER — OFFICE VISIT (OUTPATIENT)
Dept: INTERNAL MEDICINE CLINIC | Facility: CLINIC | Age: 70
End: 2020-07-13
Payer: MEDICARE

## 2020-07-13 VITALS
SYSTOLIC BLOOD PRESSURE: 136 MMHG | BODY MASS INDEX: 27.3 KG/M2 | HEART RATE: 58 BPM | DIASTOLIC BLOOD PRESSURE: 72 MMHG | HEIGHT: 71 IN | WEIGHT: 195 LBS

## 2020-07-13 DIAGNOSIS — I10 ESSENTIAL HYPERTENSION: ICD-10-CM

## 2020-07-13 DIAGNOSIS — I25.10 ASHD (ARTERIOSCLEROTIC HEART DISEASE): Primary | ICD-10-CM

## 2020-07-13 DIAGNOSIS — E78.00 HYPERCHOLESTEROLEMIA: ICD-10-CM

## 2020-07-13 DIAGNOSIS — N50.3 EPIDIDYMAL CYST: ICD-10-CM

## 2020-07-13 PROCEDURE — G0463 HOSPITAL OUTPT CLINIC VISIT: HCPCS | Performed by: INTERNAL MEDICINE

## 2020-07-13 PROCEDURE — 99213 OFFICE O/P EST LOW 20 MIN: CPT | Performed by: INTERNAL MEDICINE

## 2020-07-13 NOTE — PATIENT INSTRUCTIONS
Please continue current medications. Follow-up soon with Cardiology. Please obtain blood tests soon. Call if your burning discomfort does not soon improve. Return visit in 6 months.

## 2020-07-13 NOTE — PROGRESS NOTES
Lynne Mcclain is a 65year old male. Patient presents with:  Medication Follow-Up    HPI:   His last visit was November 2019. Last labs were done in May 2019.   For the past 1 month he has had intermittent \"burning\" discomfort in his perineal are prostatic hyperplasia)    • Erectile dysfunction    • Essential hypertension    • History of colon polyps 4/2005   • Hypercholesterolemia    • Ischemic cardiomyopathy     EF 45-50% on 9/2015   • Lumbar spinal stenosis 09/2016    MRI 8-18 with lumbar spinal labs  - COMP METABOLIC PANEL (14); Future  - LIPID PANEL; Future    4. Epididymal cyst  Monitor symptoms closely. Recommend he call soon if symptoms do not soon improve at which time he would be referred to Urology.       The patient indicates understandin

## 2020-07-15 ENCOUNTER — LAB ENCOUNTER (OUTPATIENT)
Dept: LAB | Facility: HOSPITAL | Age: 70
End: 2020-07-15
Attending: INTERNAL MEDICINE
Payer: MEDICARE

## 2020-07-15 DIAGNOSIS — I25.10 CORONARY ATHEROSCLEROSIS OF NATIVE CORONARY ARTERY: Primary | ICD-10-CM

## 2020-07-15 DIAGNOSIS — I25.10 ASHD (ARTERIOSCLEROTIC HEART DISEASE): ICD-10-CM

## 2020-07-15 DIAGNOSIS — I10 BENIGN HYPERTENSION: ICD-10-CM

## 2020-07-15 DIAGNOSIS — E78.00 HYPERCHOLESTEROLEMIA: ICD-10-CM

## 2020-07-15 LAB
ALBUMIN SERPL-MCNC: 3.6 G/DL
ALBUMIN SERPL-MCNC: 3.6 G/DL (ref 3.4–5)
ALBUMIN/GLOB SERPL: 1 {RATIO}
ALBUMIN/GLOB SERPL: 1 {RATIO} (ref 1–2)
ALP LIVER SERPL-CCNC: 99 U/L (ref 45–117)
ALP SERPL-CCNC: 99 U/L
ALT SERPL-CCNC: 16 U/L (ref 16–61)
ALT SERPL-CCNC: 16 UNITS/L
ANION GAP SERPL CALC-SCNC: 6 MMOL/L
ANION GAP SERPL CALC-SCNC: 6 MMOL/L (ref 0–18)
AST SERPL-CCNC: 21 U/L (ref 15–37)
AST SERPL-CCNC: 21 UNITS/L
BILIRUB SERPL-MCNC: 0.9 MG/DL
BILIRUB SERPL-MCNC: 0.9 MG/DL (ref 0.1–2)
BUN BLD-MCNC: 19 MG/DL (ref 7–18)
BUN SERPL-MCNC: 19 MG/DL
BUN/CREAT SERPL: 15
BUN/CREAT SERPL: 15 (ref 10–20)
CALCIUM BLD-MCNC: 9.4 MG/DL (ref 8.5–10.1)
CALCIUM SERPL-MCNC: 9.4 MG/DL
CHLORIDE SERPL-SCNC: 102 MMOL/L
CHLORIDE SERPL-SCNC: 102 MMOL/L (ref 98–112)
CHOLEST SERPL-MCNC: 112 MG/DL
CHOLEST SMN-MCNC: 112 MG/DL (ref ?–200)
CO2 SERPL-SCNC: 31 MMOL/L
CO2 SERPL-SCNC: 31 MMOL/L (ref 21–32)
CREAT BLD-MCNC: 1.27 MG/DL (ref 0.7–1.3)
CREAT SERPL-MCNC: 1.27 MG/DL
DEPRECATED RDW RBC AUTO: 44.9 FL (ref 35.1–46.3)
ERYTHROCYTE [DISTWIDTH] IN BLOOD BY AUTOMATED COUNT: 12.6 % (ref 11–15)
GLOBULIN PLAS-MCNC: 3.5 G/DL (ref 2.8–4.4)
GLOBULIN SER-MCNC: 3.5 G/DL
GLUCOSE BLD-MCNC: 93 MG/DL (ref 70–99)
GLUCOSE SERPL-MCNC: 93 MG/DL
HCT VFR BLD AUTO: 45.7 % (ref 39–53)
HCT VFR BLD CALC: 45.7 %
HDLC SERPL-MCNC: 33 MG/DL
HDLC SERPL-MCNC: 33 MG/DL (ref 40–59)
HGB BLD-MCNC: 15.6 G/DL
HGB BLD-MCNC: 15.6 G/DL (ref 13–17.5)
LDLC SERPL CALC-MCNC: 40 MG/DL
LDLC SERPL CALC-MCNC: 40 MG/DL (ref ?–100)
LENGTH OF FAST TIME PATIENT: YES H
LENGTH OF FAST TIME PATIENT: YES H
M PROTEIN MFR SERPL ELPH: 7.1 G/DL (ref 6.4–8.2)
MCH RBC QN AUTO: 32.8 PG
MCH RBC QN AUTO: 32.8 PG (ref 26–34)
MCHC RBC AUTO-ENTMCNC: 34.1 G/DL
MCHC RBC AUTO-ENTMCNC: 34.1 G/DL (ref 31–37)
MCV RBC AUTO: 96.2 FL
MCV RBC AUTO: 96.2 FL (ref 80–100)
NONHDLC SERPL-MCNC: 79 MG/DL
NONHDLC SERPL-MCNC: 79 MG/DL (ref ?–130)
OSMOLALITY SERPL CALC.SUM OF ELEC: 290 MOSM/KG (ref 275–295)
PATIENT FASTING Y/N/NP: YES
PATIENT FASTING Y/N/NP: YES
PLATELET # BLD AUTO: 209 10(3)UL (ref 150–450)
PLATELET # BLD: 209 K/MCL
POTASSIUM SERPL-SCNC: 3.8 MMOL/L
POTASSIUM SERPL-SCNC: 3.8 MMOL/L (ref 3.5–5.1)
PROT SERPL-MCNC: 7.1 G/DL
RBC # BLD AUTO: 4.75 X10(6)UL (ref 3.8–5.8)
RBC # BLD: 4.75 10*6/UL
SODIUM SERPL-SCNC: 139 MMOL/L
SODIUM SERPL-SCNC: 139 MMOL/L (ref 136–145)
TRIGL SERPL-MCNC: 197 MG/DL
TRIGL SERPL-MCNC: 197 MG/DL (ref 30–149)
VLDLC SERPL CALC-MCNC: 39 MG/DL
VLDLC SERPL CALC-MCNC: 39 MG/DL (ref 0–30)
WBC # BLD AUTO: 6.9 X10(3) UL (ref 4–11)
WBC # BLD: 6.9 K/MCL

## 2020-07-15 PROCEDURE — 80053 COMPREHEN METABOLIC PANEL: CPT

## 2020-07-15 PROCEDURE — 80061 LIPID PANEL: CPT

## 2020-07-15 PROCEDURE — 36415 COLL VENOUS BLD VENIPUNCTURE: CPT

## 2020-07-15 PROCEDURE — 85027 COMPLETE CBC AUTOMATED: CPT

## 2020-08-03 ENCOUNTER — TELEPHONE (OUTPATIENT)
Dept: CARDIOLOGY | Age: 70
End: 2020-08-03

## 2020-08-03 RX ORDER — CLOPIDOGREL BISULFATE 75 MG/1
TABLET ORAL
Qty: 16 TABLET | Refills: 4 | Status: SHIPPED | OUTPATIENT
Start: 2020-08-03 | End: 2020-09-25 | Stop reason: SDUPTHER

## 2020-08-03 RX ORDER — LISINOPRIL 20 MG/1
TABLET ORAL
Qty: 30 TABLET | Refills: 0 | Status: SHIPPED | OUTPATIENT
Start: 2020-08-03 | End: 2020-09-01

## 2020-08-04 ENCOUNTER — TELEPHONE (OUTPATIENT)
Dept: CARDIOLOGY | Age: 70
End: 2020-08-04

## 2020-08-04 RX ORDER — HYDROCHLOROTHIAZIDE 25 MG/1
TABLET ORAL
Qty: 90 TABLET | Refills: 0 | Status: SHIPPED | OUTPATIENT
Start: 2020-08-04 | End: 2020-10-29

## 2020-08-05 RX ORDER — TEMAZEPAM 30 MG/1
CAPSULE ORAL
Qty: 30 CAPSULE | Refills: 0 | Status: SHIPPED | OUTPATIENT
Start: 2020-08-05 | End: 2020-09-06

## 2020-08-06 RX ORDER — ATORVASTATIN CALCIUM 80 MG/1
TABLET, FILM COATED ORAL
Qty: 90 TABLET | Refills: 0 | Status: SHIPPED | OUTPATIENT
Start: 2020-08-06 | End: 2020-11-02

## 2020-08-31 ENCOUNTER — OFFICE VISIT (OUTPATIENT)
Dept: CARDIOLOGY | Age: 70
End: 2020-08-31

## 2020-08-31 VITALS
HEIGHT: 71 IN | WEIGHT: 195 LBS | OXYGEN SATURATION: 95 % | DIASTOLIC BLOOD PRESSURE: 64 MMHG | HEART RATE: 58 BPM | BODY MASS INDEX: 27.3 KG/M2 | SYSTOLIC BLOOD PRESSURE: 110 MMHG

## 2020-08-31 DIAGNOSIS — I25.10 CORONARY ARTERY DISEASE INVOLVING NATIVE CORONARY ARTERY OF NATIVE HEART WITHOUT ANGINA PECTORIS: Primary | ICD-10-CM

## 2020-08-31 DIAGNOSIS — E78.2 HYPERLIPIDEMIA, MIXED: ICD-10-CM

## 2020-08-31 DIAGNOSIS — R94.31 ABNORMAL EKG: ICD-10-CM

## 2020-08-31 DIAGNOSIS — I10 BENIGN HYPERTENSION: ICD-10-CM

## 2020-08-31 PROCEDURE — 99214 OFFICE O/P EST MOD 30 MIN: CPT | Performed by: INTERNAL MEDICINE

## 2020-08-31 ASSESSMENT — PATIENT HEALTH QUESTIONNAIRE - PHQ9
1. LITTLE INTEREST OR PLEASURE IN DOING THINGS: NOT AT ALL
CLINICAL INTERPRETATION OF PHQ9 SCORE: NO FURTHER SCREENING NEEDED
CLINICAL INTERPRETATION OF PHQ2 SCORE: NO FURTHER SCREENING NEEDED
SUM OF ALL RESPONSES TO PHQ9 QUESTIONS 1 AND 2: 0
SUM OF ALL RESPONSES TO PHQ9 QUESTIONS 1 AND 2: 0
2. FEELING DOWN, DEPRESSED OR HOPELESS: NOT AT ALL

## 2020-09-01 ENCOUNTER — MED REC SCAN ONLY (OUTPATIENT)
Dept: INTERNAL MEDICINE CLINIC | Facility: CLINIC | Age: 70
End: 2020-09-01

## 2020-09-01 RX ORDER — LISINOPRIL 20 MG/1
TABLET ORAL
Qty: 30 TABLET | Refills: 0 | Status: SHIPPED | OUTPATIENT
Start: 2020-09-01 | End: 2020-10-02

## 2020-09-06 RX ORDER — TEMAZEPAM 30 MG/1
CAPSULE ORAL
Qty: 30 CAPSULE | Refills: 0 | Status: SHIPPED | OUTPATIENT
Start: 2020-09-06 | End: 2020-10-04

## 2020-09-25 ENCOUNTER — TELEPHONE (OUTPATIENT)
Dept: CARDIOLOGY | Age: 70
End: 2020-09-25

## 2020-09-25 RX ORDER — CLOPIDOGREL BISULFATE 75 MG/1
75 TABLET ORAL EVERY OTHER DAY
Qty: 45 TABLET | Refills: 2 | Status: SHIPPED | OUTPATIENT
Start: 2020-09-25 | End: 2020-12-23

## 2020-10-01 RX ORDER — GABAPENTIN 100 MG/1
200 CAPSULE ORAL 2 TIMES DAILY
Qty: 120 CAPSULE | Refills: 5 | Status: SHIPPED | OUTPATIENT
Start: 2020-10-01 | End: 2021-03-27

## 2020-10-02 RX ORDER — LISINOPRIL 20 MG/1
TABLET ORAL
Qty: 30 TABLET | Refills: 5 | Status: SHIPPED | OUTPATIENT
Start: 2020-10-02 | End: 2021-03-01 | Stop reason: DRUGHIGH

## 2020-10-04 RX ORDER — TEMAZEPAM 30 MG/1
CAPSULE ORAL
Qty: 30 CAPSULE | Refills: 0 | Status: SHIPPED | OUTPATIENT
Start: 2020-10-04 | End: 2020-11-02

## 2020-10-29 RX ORDER — HYDROCHLOROTHIAZIDE 25 MG/1
TABLET ORAL
Qty: 90 TABLET | Refills: 0 | Status: SHIPPED | OUTPATIENT
Start: 2020-10-29 | End: 2021-01-28

## 2020-11-02 RX ORDER — TEMAZEPAM 30 MG/1
CAPSULE ORAL
Qty: 30 CAPSULE | Refills: 0 | Status: SHIPPED | OUTPATIENT
Start: 2020-11-02 | End: 2020-11-30

## 2020-11-02 RX ORDER — ATORVASTATIN CALCIUM 80 MG/1
TABLET, FILM COATED ORAL
Qty: 90 TABLET | Refills: 0 | Status: SHIPPED | OUTPATIENT
Start: 2020-11-02 | End: 2021-01-28

## 2020-11-30 RX ORDER — TEMAZEPAM 30 MG/1
CAPSULE ORAL
Qty: 30 CAPSULE | Refills: 0 | Status: SHIPPED | OUTPATIENT
Start: 2020-11-30 | End: 2021-01-03

## 2020-12-23 RX ORDER — CLOPIDOGREL BISULFATE 75 MG/1
TABLET ORAL
Qty: 16 TABLET | Refills: 0 | Status: SHIPPED | OUTPATIENT
Start: 2020-12-23 | End: 2021-01-28

## 2020-12-23 RX ORDER — METOPROLOL SUCCINATE 25 MG/1
TABLET, EXTENDED RELEASE ORAL
Qty: 30 TABLET | Refills: 0 | Status: SHIPPED | OUTPATIENT
Start: 2020-12-23

## 2021-01-03 RX ORDER — TEMAZEPAM 30 MG/1
CAPSULE ORAL
Qty: 30 CAPSULE | Refills: 0 | Status: SHIPPED | OUTPATIENT
Start: 2021-01-03 | End: 2021-01-27

## 2021-01-27 RX ORDER — TEMAZEPAM 30 MG/1
CAPSULE ORAL
Qty: 30 CAPSULE | Refills: 0 | Status: SHIPPED | OUTPATIENT
Start: 2021-01-27 | End: 2021-02-12

## 2021-01-28 RX ORDER — HYDROCHLOROTHIAZIDE 25 MG/1
TABLET ORAL
Qty: 90 TABLET | Refills: 0 | Status: SHIPPED | OUTPATIENT
Start: 2021-01-28

## 2021-01-28 RX ORDER — ATORVASTATIN CALCIUM 80 MG/1
TABLET, FILM COATED ORAL
Qty: 90 TABLET | Refills: 0 | Status: SHIPPED | OUTPATIENT
Start: 2021-01-28

## 2021-01-28 RX ORDER — CLOPIDOGREL BISULFATE 75 MG/1
TABLET ORAL
Qty: 16 TABLET | Refills: 0 | Status: SHIPPED | OUTPATIENT
Start: 2021-01-28

## 2021-02-02 ENCOUNTER — TELEPHONE (OUTPATIENT)
Dept: CARDIOLOGY | Age: 71
End: 2021-02-02

## 2021-02-02 RX ORDER — METOPROLOL SUCCINATE 25 MG/1
25 TABLET, EXTENDED RELEASE ORAL DAILY
Qty: 90 TABLET | Refills: 1 | Status: SHIPPED | OUTPATIENT
Start: 2021-02-02

## 2021-02-02 RX ORDER — METOPROLOL SUCCINATE 25 MG/1
TABLET, EXTENDED RELEASE ORAL
Qty: 30 TABLET | Refills: 0 | OUTPATIENT
Start: 2021-02-02

## 2021-02-12 ENCOUNTER — TELEPHONE (OUTPATIENT)
Dept: INTERNAL MEDICINE CLINIC | Facility: CLINIC | Age: 71
End: 2021-02-12

## 2021-02-12 ENCOUNTER — OFFICE VISIT (OUTPATIENT)
Dept: INTERNAL MEDICINE CLINIC | Facility: CLINIC | Age: 71
End: 2021-02-12
Payer: MEDICARE

## 2021-02-12 VITALS
WEIGHT: 202 LBS | BODY MASS INDEX: 28.28 KG/M2 | HEIGHT: 71 IN | RESPIRATION RATE: 16 BRPM | HEART RATE: 62 BPM | SYSTOLIC BLOOD PRESSURE: 134 MMHG | DIASTOLIC BLOOD PRESSURE: 72 MMHG

## 2021-02-12 DIAGNOSIS — Z00.00 ANNUAL PHYSICAL EXAM: Primary | ICD-10-CM

## 2021-02-12 DIAGNOSIS — I70.0 ATHEROSCLEROSIS OF AORTA (HCC): ICD-10-CM

## 2021-02-12 DIAGNOSIS — E78.00 HYPERCHOLESTEROLEMIA: ICD-10-CM

## 2021-02-12 DIAGNOSIS — I10 ESSENTIAL HYPERTENSION: ICD-10-CM

## 2021-02-12 DIAGNOSIS — Z86.010 HISTORY OF COLON POLYPS: ICD-10-CM

## 2021-02-12 DIAGNOSIS — Z12.5 PROSTATE CANCER SCREENING: ICD-10-CM

## 2021-02-12 DIAGNOSIS — I25.5 ISCHEMIC CARDIOMYOPATHY: ICD-10-CM

## 2021-02-12 DIAGNOSIS — I25.10 ASHD (ARTERIOSCLEROTIC HEART DISEASE): ICD-10-CM

## 2021-02-12 DIAGNOSIS — Z00.00 ENCOUNTER FOR ANNUAL HEALTH EXAMINATION: ICD-10-CM

## 2021-02-12 PROCEDURE — G0439 PPPS, SUBSEQ VISIT: HCPCS | Performed by: INTERNAL MEDICINE

## 2021-02-12 PROCEDURE — 96160 PT-FOCUSED HLTH RISK ASSMT: CPT | Performed by: INTERNAL MEDICINE

## 2021-02-12 PROCEDURE — 99397 PER PM REEVAL EST PAT 65+ YR: CPT | Performed by: INTERNAL MEDICINE

## 2021-02-12 RX ORDER — METOPROLOL SUCCINATE 25 MG/1
25 TABLET, EXTENDED RELEASE ORAL DAILY
Qty: 90 TABLET | Refills: 1 | Status: SHIPPED | OUTPATIENT
Start: 2021-02-12 | End: 2022-01-15

## 2021-02-12 RX ORDER — ATORVASTATIN CALCIUM 80 MG/1
80 TABLET, FILM COATED ORAL NIGHTLY
Qty: 90 TABLET | Refills: 1 | Status: SHIPPED | OUTPATIENT
Start: 2021-02-12 | End: 2021-10-20

## 2021-02-12 RX ORDER — TEMAZEPAM 30 MG/1
30 CAPSULE ORAL NIGHTLY PRN
Qty: 30 CAPSULE | Refills: 0 | Status: SHIPPED | OUTPATIENT
Start: 2021-02-12 | End: 2021-03-29

## 2021-02-12 RX ORDER — HYDROCHLOROTHIAZIDE 25 MG/1
25 TABLET ORAL DAILY
Qty: 90 TABLET | Refills: 1 | Status: SHIPPED | OUTPATIENT
Start: 2021-02-12 | End: 2021-10-20

## 2021-02-12 RX ORDER — LISINOPRIL 20 MG/1
20 TABLET ORAL DAILY
Qty: 90 TABLET | Refills: 1 | Status: SHIPPED | OUTPATIENT
Start: 2021-02-12 | End: 2021-08-06

## 2021-02-12 RX ORDER — CLOPIDOGREL BISULFATE 75 MG/1
75 TABLET ORAL DAILY
Qty: 90 TABLET | Refills: 1 | Status: SHIPPED | OUTPATIENT
Start: 2021-02-12 | End: 2022-01-15

## 2021-02-12 NOTE — TELEPHONE ENCOUNTER
Informed [facility] of correct dosage per Dr. Jamison Briggs - every other day.

## 2021-02-12 NOTE — PROGRESS NOTES
HPI:   Moira Schwarz is a 66year old male who presents this morning for a Medicare Subsequent Annual Wellness visit (Pt already had Initial Annual Wellness). His last physical was November 2019. Lately he has been feeling well.   No specific is Smoker        Packs/day: 1.00        Years: 50.00        Pack years: 48        Types: Cigarettes        Quit date: 9/3/2013        Years since quittin.4      Smokeless tobacco: Never Used       Mr. Nancy West already takes aspirin and has it on his m Succinate ER 25 MG Oral Tablet 24 Hr, Take 1 tablet (25 mg total) by mouth daily. •  atorvastatin (LIPITOR) 80 MG Oral Tab, Take 1 tablet (80 mg total) by mouth nightly.     •  Clopidogrel Bisulfate 75 MG Oral Tab, Take 1 tablet (75 mg total) by mouth da bleeding  : Chronically weak urinary stream.  No urinary frequency dysuria or hematuria  MUSCULOSKELETAL: No leg swelling  NEURO: Occasional intention tremor left hand.   No headaches        EXAM:   /72   Pulse 62   Resp 16   Ht 5' 11\" (1.803 m) Chart Acuity: 20/40   Able To Tolerate Visual Acuity: Yes        EXAM:   GENERAL: Pleasant male appearing well in no distress  /72   Pulse 62   Resp 16   Ht 5' 11\" (1.803 m)   Wt 202 lb (91.6 kg)   BMI 28.17 kg/m²   HEENT: Anicteric, conjunctiva pin visit in 6 months. ASHD (arteriosclerotic heart disease)  Asymptomatic. Continue current medications including statin, clopidogrel and aspirin.   Regular Cardiology follow-up    Ischemic cardiomyopathy  Compensated without symptoms or signs of CHF  - None  How would you describe your current health state?: Fair  How do you maintain positive mental well-being?: Visiting Family    This section provided for quick review of chart, separate sheet to patient  PREVENTATIVE SERVICES  INDICATIONS AND SCHEDULE I drug abusers     Tetanus Toxoid  Only covered with a cut with metal- TD and TDaP Not covered by Medicare Part B) No vaccine history found This may be covered with your prescription benefits, but Medicare does not cover unless Medically needed    Zoster   N

## 2021-02-12 NOTE — PATIENT INSTRUCTIONS
Pediatric Well Child Exam: 24 Months of Age    Chief Complaint   Patient presents with   • Well Child     mom consurn about her sleeping patterns.       SUBJECTIVE:  Divya Mancini is a 31 month old female who presents for a 24 month well child exam.  Patient presents with Father and Mother.    CONCERNS RAISED TODAY: none    SLEEP:  Change in sleeping pattern 10-12    DIET:   Good, balance, fruit, veggies    VOIDING/STOOLING:  BM- 1x-2Voids well     CAREGIVER:   mom    FAMILY/HOME ENVIRONMENT:  good    SCREEN TIME:   1-2    DEVELOPMENT:  [x]  YES     []  NO     []  UNKNOWN  Walks up and down steps without help?   [x]  YES     []  NO     []  UNKNOWN  Removes shoes?   [x]  YES     []  NO     []  UNKNOWN   Builds tower of seven blocks?   [x]  YES     []  NO     []  UNKNOWN   Turns pages one at a time?   [x]  YES     []  NO     []  UNKNOWN   Imitates stroke with pen/pencil?  [x]  YES     []  NO     []  UNKNOWN  Follows 2-step command?   [x]  YES     []  NO     []  UNKNOWN  Uses pronouns inappropriately?   [x]  YES     []  NO     []  UNKNOWN   Two word sentences?   [x]  YES     []  NO     []  UNKNOWN   50-word vocabulary?  [x]  YES     []  NO     []  UNKNOWN   Parallel play?     Developmental milestones were reviewed.    ASQ:  ASQ-3 Screen    Results:    Communication:  Score: 60   Gross Motor:  Score: 60   Fine Motor:  Score: 60   Problem Solving:  Score: 60   Personal-Social:  Score: 60   Other Concerns:             Disposition:    M-CHAT Performed?: No            OBJECTIVE:  BIRTH HX:  No birth history on file.    SURGICAL HX:  History reviewed. No pertinent surgical history.     FAMILY HX:  Family History   Problem Relation Age of Onset   • Hyperlipidemia Father    • Hypertension Father    • Diabetes Maternal Grandfather        ALLERGIES:  ALLERGIES:   Allergen Reactions   • Eggs Or Egg-Derived Products   (Food Or Med) ANAPHYLAXIS     Successful oral baked/processed egg challenge 10/2019.   • Milk    (Food Or  Please get blood tests done soon when you can. Contact GI to arrange repeat colonoscopy. Continue regular follow-up with Cardiology. Continue current medications. Please try to eat healthy and exercise regularly. Return visit in 6 months.   Russell Ledezma Med) ANAPHYLAXIS   • Peanut   (Food Or Med) ANAPHYLAXIS   • Shellfish Allergy   (Food Or Med) ANAPHYLAXIS   • Tree Nuts    (Food) ANAPHYLAXIS   • Avocado   (Food Or Med) HIVES   • Flax Seed   (Food Or Med) RASH   • Soy Allergy   (Food Or Med) RASH          PAST HISTORIES:  Allergies, Medications, Medical HX, Surgical HX, Family HX reviewed and updated.  IMMUNIZATION STATUS: Up to date as of today's visit   IMMUNIZATION REACTIONS: Denied by caregiver      RECENT HEALTH EVENTS:  Illnesses: no  Hospitalizations:no  Injuries or Accidents:no    REVIEW OF SYSTEMS:    Review of Systems   Skin: Positive for rash.   Allergic/Immunologic: Positive for environmental allergies and food allergies.          PHYSICAL EXAM:    Physical Exam  Constitutional:       General: She is active.      Appearance: Normal appearance. She is well-developed.   HENT:      Head: Normocephalic.      Right Ear: Tympanic membrane normal.      Left Ear: Tympanic membrane normal.      Nose: Nose normal.      Mouth/Throat:      Mouth: Mucous membranes are moist.      Pharynx: Oropharynx is clear.   Eyes:      Extraocular Movements: Extraocular movements intact.      Conjunctiva/sclera: Conjunctivae normal.      Pupils: Pupils are equal, round, and reactive to light.   Neck:      Musculoskeletal: Normal range of motion and neck supple.   Cardiovascular:      Rate and Rhythm: Regular rhythm.      Pulses: Normal pulses.      Heart sounds: Normal heart sounds.   Pulmonary:      Effort: Pulmonary effort is normal.      Breath sounds: Normal breath sounds.   Abdominal:      General: Abdomen is flat.      Palpations: Abdomen is soft.   Genitourinary:     General: Normal vulva.   Musculoskeletal: Normal range of motion.   Skin:     General: Skin is warm.   Neurological:      General: No focal deficit present.      Mental Status: She is alert and oriented for age.          Visit Vitals  Temp 99 °F (37.2 °C)   Ht 2' 10.5\" (0.876 m)   Wt 10.9 kg (24 lb 0.5 oz)  criteria:   • Men who are 73-68 years old and have smoked more than 100 cigarettes in their lifetime   • Anyone with a family history    Colorectal Cancer Screening Covered up to Age 76     Colonoscopy Screen   Covered every 10 years- more often if abnorma   BMI 14.19 kg/m²        ASSESSMENT/PLAN:  31 month old female well child exam  Diagnoses and all orders for this visit:  Encounter for routine child health examination without abnormal findings  -     HEPATITIS A VACCINE PEDIATRIC / ADOLESCENT 2 DOSE IM     ED Diagnosis   1. Encounter for routine child health examination without abnormal findings  HEPATITIS A VACCINE PEDIATRIC / ADOLESCENT 2 DOSE IM        Orders Placed This Encounter   • Hepatitis A vaccine pediatric / adolescent 2 dose IM        5 %ile (Z= -1.61) based on CDC (Girls, 2-20 Years) BMI-for-age based on BMI available as of 9/14/2020.         1. All parental concerns and questions discussed.  2. Anticipatory guidance provided.   3. Immunizations per orders.  Risks, benefits, and side effects discussed.  4. VIS for Immunizations given.    Follow up:Return in about 5 months (around 2/14/2021).    Danielle Venegas MD                   virus carrier   Homosexual men   Illicit injectable drug abusers     Tetanus Toxoid- Only covered with a cut with metal- TD and TDaP Not covered by Medicare Part B) No orders found for this or any previous visit.  This may be covered with your prescription

## 2021-02-12 NOTE — TELEPHONE ENCOUNTER
Pharmacy calling to clarify directions, it came with two sets.  Please advise       Clopidogrel Bisulfate 75 MG Oral Tab

## 2021-02-15 ENCOUNTER — LAB ENCOUNTER (OUTPATIENT)
Dept: LAB | Facility: HOSPITAL | Age: 71
End: 2021-02-15
Attending: INTERNAL MEDICINE
Payer: MEDICARE

## 2021-02-15 DIAGNOSIS — I25.5 ISCHEMIC CARDIOMYOPATHY: ICD-10-CM

## 2021-02-15 DIAGNOSIS — E78.00 HYPERCHOLESTEROLEMIA: ICD-10-CM

## 2021-02-15 DIAGNOSIS — Z12.5 PROSTATE CANCER SCREENING: ICD-10-CM

## 2021-02-15 LAB
ALBUMIN SERPL-MCNC: 3.4 G/DL (ref 3.4–5)
ALBUMIN/GLOB SERPL: 1.1 {RATIO} (ref 1–2)
ALP LIVER SERPL-CCNC: 110 U/L
ALT SERPL-CCNC: 15 U/L
ANION GAP SERPL CALC-SCNC: 2 MMOL/L (ref 0–18)
AST SERPL-CCNC: 14 U/L (ref 15–37)
BILIRUB SERPL-MCNC: 0.7 MG/DL (ref 0.1–2)
BUN BLD-MCNC: 19 MG/DL (ref 7–18)
BUN/CREAT SERPL: 16.8 (ref 10–20)
CALCIUM BLD-MCNC: 9.2 MG/DL (ref 8.5–10.1)
CHLORIDE SERPL-SCNC: 104 MMOL/L (ref 98–112)
CHOLEST SMN-MCNC: 132 MG/DL (ref ?–200)
CO2 SERPL-SCNC: 33 MMOL/L (ref 21–32)
COMPLEXED PSA SERPL-MCNC: 2.84 NG/ML (ref ?–4)
CREAT BLD-MCNC: 1.13 MG/DL
DEPRECATED RDW RBC AUTO: 44.8 FL (ref 35.1–46.3)
ERYTHROCYTE [DISTWIDTH] IN BLOOD BY AUTOMATED COUNT: 12.3 % (ref 11–15)
GLOBULIN PLAS-MCNC: 3.2 G/DL (ref 2.8–4.4)
GLUCOSE BLD-MCNC: 92 MG/DL (ref 70–99)
HCT VFR BLD AUTO: 45.9 %
HDLC SERPL-MCNC: 39 MG/DL (ref 40–59)
HGB BLD-MCNC: 15.2 G/DL
LDLC SERPL CALC-MCNC: 63 MG/DL (ref ?–100)
M PROTEIN MFR SERPL ELPH: 6.6 G/DL (ref 6.4–8.2)
MCH RBC QN AUTO: 32.7 PG (ref 26–34)
MCHC RBC AUTO-ENTMCNC: 33.1 G/DL (ref 31–37)
MCV RBC AUTO: 98.7 FL
NONHDLC SERPL-MCNC: 93 MG/DL (ref ?–130)
OSMOLALITY SERPL CALC.SUM OF ELEC: 290 MOSM/KG (ref 275–295)
PATIENT FASTING Y/N/NP: YES
PATIENT FASTING Y/N/NP: YES
PLATELET # BLD AUTO: 227 10(3)UL (ref 150–450)
POTASSIUM SERPL-SCNC: 3.8 MMOL/L (ref 3.5–5.1)
RBC # BLD AUTO: 4.65 X10(6)UL
SODIUM SERPL-SCNC: 139 MMOL/L (ref 136–145)
TRIGL SERPL-MCNC: 150 MG/DL (ref 30–149)
TSI SER-ACNC: 1.44 MIU/ML (ref 0.36–3.74)
VLDLC SERPL CALC-MCNC: 30 MG/DL (ref 0–30)
WBC # BLD AUTO: 5.6 X10(3) UL (ref 4–11)

## 2021-02-15 PROCEDURE — 85027 COMPLETE CBC AUTOMATED: CPT

## 2021-02-15 PROCEDURE — 80053 COMPREHEN METABOLIC PANEL: CPT

## 2021-02-15 PROCEDURE — 36415 COLL VENOUS BLD VENIPUNCTURE: CPT

## 2021-02-15 PROCEDURE — 84443 ASSAY THYROID STIM HORMONE: CPT

## 2021-02-15 PROCEDURE — 80061 LIPID PANEL: CPT

## 2021-02-26 ENCOUNTER — TELEPHONE (OUTPATIENT)
Dept: CARDIOLOGY | Age: 71
End: 2021-02-26

## 2021-03-01 ENCOUNTER — MED REC SCAN ONLY (OUTPATIENT)
Dept: INTERNAL MEDICINE CLINIC | Facility: CLINIC | Age: 71
End: 2021-03-01

## 2021-03-01 ENCOUNTER — OFFICE VISIT (OUTPATIENT)
Dept: CARDIOLOGY | Age: 71
End: 2021-03-01

## 2021-03-01 VITALS
SYSTOLIC BLOOD PRESSURE: 102 MMHG | OXYGEN SATURATION: 95 % | BODY MASS INDEX: 27.2 KG/M2 | DIASTOLIC BLOOD PRESSURE: 68 MMHG | HEART RATE: 62 BPM | HEIGHT: 71 IN

## 2021-03-01 DIAGNOSIS — E78.2 HYPERLIPIDEMIA, MIXED: ICD-10-CM

## 2021-03-01 DIAGNOSIS — I25.10 CORONARY ARTERY DISEASE INVOLVING NATIVE CORONARY ARTERY OF NATIVE HEART WITHOUT ANGINA PECTORIS: Primary | ICD-10-CM

## 2021-03-01 DIAGNOSIS — R06.02 SHORTNESS OF BREATH: ICD-10-CM

## 2021-03-01 DIAGNOSIS — I10 BENIGN HYPERTENSION: ICD-10-CM

## 2021-03-01 PROCEDURE — 99214 OFFICE O/P EST MOD 30 MIN: CPT | Performed by: INTERNAL MEDICINE

## 2021-03-01 RX ORDER — LISINOPRIL 10 MG/1
10 TABLET ORAL DAILY
COMMUNITY

## 2021-03-01 ASSESSMENT — PATIENT HEALTH QUESTIONNAIRE - PHQ9
1. LITTLE INTEREST OR PLEASURE IN DOING THINGS: NOT AT ALL
SUM OF ALL RESPONSES TO PHQ9 QUESTIONS 1 AND 2: 0
CLINICAL INTERPRETATION OF PHQ9 SCORE: NO FURTHER SCREENING NEEDED
2. FEELING DOWN, DEPRESSED OR HOPELESS: NOT AT ALL
CLINICAL INTERPRETATION OF PHQ2 SCORE: NO FURTHER SCREENING NEEDED
SUM OF ALL RESPONSES TO PHQ9 QUESTIONS 1 AND 2: 0

## 2021-03-05 ENCOUNTER — ANCILLARY PROCEDURE (OUTPATIENT)
Dept: CARDIOLOGY | Age: 71
End: 2021-03-05
Attending: INTERNAL MEDICINE

## 2021-03-05 DIAGNOSIS — E78.2 HYPERLIPIDEMIA, MIXED: ICD-10-CM

## 2021-03-05 DIAGNOSIS — I10 BENIGN HYPERTENSION: ICD-10-CM

## 2021-03-05 DIAGNOSIS — R06.02 SHORTNESS OF BREATH: ICD-10-CM

## 2021-03-05 DIAGNOSIS — I25.10 CORONARY ARTERY DISEASE INVOLVING NATIVE CORONARY ARTERY OF NATIVE HEART WITHOUT ANGINA PECTORIS: ICD-10-CM

## 2021-03-05 PROCEDURE — 93306 TTE W/DOPPLER COMPLETE: CPT | Performed by: INTERNAL MEDICINE

## 2021-03-08 DIAGNOSIS — Z23 NEED FOR VACCINATION: ICD-10-CM

## 2021-03-09 ENCOUNTER — ANCILLARY PROCEDURE (OUTPATIENT)
Dept: CARDIOLOGY | Age: 71
End: 2021-03-09
Attending: INTERNAL MEDICINE

## 2021-03-09 VITALS — WEIGHT: 195 LBS | BODY MASS INDEX: 27.3 KG/M2 | HEIGHT: 71 IN

## 2021-03-09 DIAGNOSIS — I25.10 CORONARY ARTERY DISEASE INVOLVING NATIVE CORONARY ARTERY OF NATIVE HEART WITHOUT ANGINA PECTORIS: ICD-10-CM

## 2021-03-09 DIAGNOSIS — R06.02 SHORTNESS OF BREATH: ICD-10-CM

## 2021-03-09 DIAGNOSIS — I10 BENIGN HYPERTENSION: ICD-10-CM

## 2021-03-09 DIAGNOSIS — E78.2 HYPERLIPIDEMIA, MIXED: ICD-10-CM

## 2021-03-09 LAB
LV EF: 57 %
STRESS POST EXERCISE DUR SEC: 30 SEC
STRESS TARGET HR: 147 BPM

## 2021-03-09 PROCEDURE — 78452 HT MUSCLE IMAGE SPECT MULT: CPT | Performed by: INTERNAL MEDICINE

## 2021-03-09 PROCEDURE — 93015 CV STRESS TEST SUPVJ I&R: CPT | Performed by: INTERNAL MEDICINE

## 2021-03-09 PROCEDURE — A9502 TC99M TETROFOSMIN: HCPCS | Performed by: INTERNAL MEDICINE

## 2021-03-09 RX ORDER — REGADENOSON 0.08 MG/ML
0.4 INJECTION, SOLUTION INTRAVENOUS ONCE
Status: COMPLETED | OUTPATIENT
Start: 2021-03-09 | End: 2021-03-09

## 2021-03-09 RX ADMIN — REGADENOSON 0.4 MG: 0.08 INJECTION, SOLUTION INTRAVENOUS at 10:50

## 2021-03-09 ASSESSMENT — EXERCISE STRESS TEST
PEAK_RPP: 9360
PEAK_BP: 120/64
PEAK_RPP: 9230
PEAK_BP: 130/80
PEAK_HR: 81
COMMENTS: 4 MINUTE RECOVERY
STAGE_CATEGORIES: RECOVERY 0
PEAK_HR: 71
PEAK_BP: 118/74
PEAK_HR: 60
STOPPAGE_REASON: PROTOCOL COMPLETE
PEAK_RPP: 10044
PEAK_HR: 78
STAGE_CATEGORIES: RECOVERY 2
PEAK_HR: 57
COMMENTS: 20 SECOND RECOVERY
PEAK_BP: 124/70
STAGE_CATEGORIES: RECOVERY 1
PEAK_RPP: 6726
STAGE_CATEGORIES: 1
STRESS_SYMPTOMS: DYSPNEA
STAGE_CATEGORIES: RESTING

## 2021-03-10 ENCOUNTER — TELEPHONE (OUTPATIENT)
Dept: CARDIOLOGY | Age: 71
End: 2021-03-10

## 2021-03-10 ENCOUNTER — IMMUNIZATION (OUTPATIENT)
Dept: LAB | Age: 71
End: 2021-03-10
Attending: HOSPITALIST
Payer: MEDICARE

## 2021-03-10 DIAGNOSIS — Z23 NEED FOR VACCINATION: Primary | ICD-10-CM

## 2021-03-10 PROCEDURE — 0001A SARSCOV2 VAC 30MCG/0.3ML IM: CPT

## 2021-03-27 RX ORDER — GABAPENTIN 100 MG/1
200 CAPSULE ORAL 2 TIMES DAILY
Qty: 120 CAPSULE | Refills: 5 | Status: SHIPPED | OUTPATIENT
Start: 2021-03-27 | End: 2021-09-18

## 2021-03-29 ENCOUNTER — OFFICE VISIT (OUTPATIENT)
Dept: CARDIOLOGY | Age: 71
End: 2021-03-29

## 2021-03-29 VITALS
OXYGEN SATURATION: 94 % | BODY MASS INDEX: 28.42 KG/M2 | HEIGHT: 71 IN | WEIGHT: 203 LBS | DIASTOLIC BLOOD PRESSURE: 72 MMHG | SYSTOLIC BLOOD PRESSURE: 118 MMHG | HEART RATE: 62 BPM

## 2021-03-29 DIAGNOSIS — I10 BENIGN HYPERTENSION: ICD-10-CM

## 2021-03-29 DIAGNOSIS — I25.10 CORONARY ARTERY DISEASE INVOLVING NATIVE CORONARY ARTERY OF NATIVE HEART WITHOUT ANGINA PECTORIS: Primary | ICD-10-CM

## 2021-03-29 PROCEDURE — 3078F DIAST BP <80 MM HG: CPT | Performed by: NURSE PRACTITIONER

## 2021-03-29 PROCEDURE — 3074F SYST BP LT 130 MM HG: CPT | Performed by: NURSE PRACTITIONER

## 2021-03-29 PROCEDURE — 99214 OFFICE O/P EST MOD 30 MIN: CPT | Performed by: NURSE PRACTITIONER

## 2021-03-29 RX ORDER — TEMAZEPAM 30 MG/1
CAPSULE ORAL
Qty: 30 CAPSULE | Refills: 0 | Status: SHIPPED | OUTPATIENT
Start: 2021-03-29 | End: 2021-04-28

## 2021-03-29 ASSESSMENT — PATIENT HEALTH QUESTIONNAIRE - PHQ9
CLINICAL INTERPRETATION OF PHQ9 SCORE: NO FURTHER SCREENING NEEDED
SUM OF ALL RESPONSES TO PHQ9 QUESTIONS 1 AND 2: 0
SUM OF ALL RESPONSES TO PHQ9 QUESTIONS 1 AND 2: 0
2. FEELING DOWN, DEPRESSED OR HOPELESS: NOT AT ALL
1. LITTLE INTEREST OR PLEASURE IN DOING THINGS: NOT AT ALL
CLINICAL INTERPRETATION OF PHQ2 SCORE: NO FURTHER SCREENING NEEDED

## 2021-03-30 ENCOUNTER — MED REC SCAN ONLY (OUTPATIENT)
Dept: INTERNAL MEDICINE CLINIC | Facility: CLINIC | Age: 71
End: 2021-03-30

## 2021-03-31 ENCOUNTER — IMMUNIZATION (OUTPATIENT)
Dept: LAB | Age: 71
End: 2021-03-31
Attending: HOSPITALIST
Payer: MEDICARE

## 2021-03-31 DIAGNOSIS — Z23 NEED FOR VACCINATION: Primary | ICD-10-CM

## 2021-03-31 PROCEDURE — 0002A SARSCOV2 VAC 30MCG/0.3ML IM: CPT

## 2021-04-22 ENCOUNTER — OFFICE VISIT (OUTPATIENT)
Dept: GASTROENTEROLOGY | Facility: CLINIC | Age: 71
End: 2021-04-22
Payer: MEDICARE

## 2021-04-22 ENCOUNTER — TELEPHONE (OUTPATIENT)
Dept: GASTROENTEROLOGY | Facility: CLINIC | Age: 71
End: 2021-04-22

## 2021-04-22 VITALS
BODY MASS INDEX: 28.42 KG/M2 | SYSTOLIC BLOOD PRESSURE: 146 MMHG | DIASTOLIC BLOOD PRESSURE: 82 MMHG | HEIGHT: 71 IN | WEIGHT: 203 LBS | HEART RATE: 54 BPM

## 2021-04-22 DIAGNOSIS — Z86.010 HISTORY OF COLON POLYPS: Primary | ICD-10-CM

## 2021-04-22 DIAGNOSIS — Z12.11 SCREENING FOR COLORECTAL CANCER: ICD-10-CM

## 2021-04-22 DIAGNOSIS — Z12.12 SCREENING FOR COLORECTAL CANCER: ICD-10-CM

## 2021-04-22 DIAGNOSIS — Z12.11 COLON CANCER SCREENING: ICD-10-CM

## 2021-04-22 PROCEDURE — 3008F BODY MASS INDEX DOCD: CPT | Performed by: INTERNAL MEDICINE

## 2021-04-22 PROCEDURE — 3079F DIAST BP 80-89 MM HG: CPT | Performed by: INTERNAL MEDICINE

## 2021-04-22 PROCEDURE — 99213 OFFICE O/P EST LOW 20 MIN: CPT | Performed by: INTERNAL MEDICINE

## 2021-04-22 PROCEDURE — 3077F SYST BP >= 140 MM HG: CPT | Performed by: INTERNAL MEDICINE

## 2021-04-22 RX ORDER — SODIUM, POTASSIUM,MAG SULFATES 17.5-3.13G
SOLUTION, RECONSTITUTED, ORAL ORAL
Qty: 1 BOTTLE | Refills: 0 | Status: SHIPPED | OUTPATIENT
Start: 2021-04-22 | End: 2021-08-20

## 2021-04-22 NOTE — TELEPHONE ENCOUNTER
Scheduled for:  Colonoscopy [id number]  Provider Name: Dr Eduardo Barcenas  Date:  08/03/2021  Location:  [facility]  Sedation:  MAC  Time:  11:15am (pt is aware to arrive at 10:15am)  Prep:  Suprep  Meds/Allergies Reconciled?:  Physician reviewed  Diagnosis with codes:

## 2021-04-22 NOTE — PROGRESS NOTES
HPI/Subjective:   Patient ID: Kenia Early is a 66year old male. HPI  The patient presents today to discuss a colonoscopy.   Chart review reveals an initial colonoscopy performed by Dr. Meli Hahn in 2005 (diarrhea and rectal bleeding) [facility] Succinate ER 25 MG Oral Tablet 24 Hr Take 1 tablet (25 mg total) by mouth daily. 90 tablet 1   • atorvastatin (LIPITOR) 80 MG Oral Tab Take 1 tablet (80 mg total) by mouth nightly.  (Patient taking differently: Take 10 mg by mouth nightly.  ) 90 tablet 1 Dioxide, Total      21.0 - 32.0 mmol/L 33.0 (H)   ANION GAP      0 - 18 mmol/L 2   BUN      7 - 18 mg/dL 19 (H)   CREATININE      0.70 - 1.30 mg/dL 1.13   BUN/CREAT Ratio      10.0 - 20.0 16.8   CALCIUM      8.5 - 10.1 mg/dL 9.2   CALCULATED OSMOLALITY MiraLAX/Gatorade preparation and have elected Suprep. We will query the patient's cardiologist if clopidogrel can be held for 5 days preceding the procedure. Aspirin will be continued.   The procedure can be scheduled with either IV sedation or monitored

## 2021-04-22 NOTE — PATIENT INSTRUCTIONS
1.  Schedule colonoscopy for screening/a history of polyps following a split dose Suprep (the patient may substitute MiraLAX/Gatorade if cost is an issue). IV sedation or monitored anesthesia care.   2.  We will aske Dr. Shanice De La Cruz if you can hold the Plavix

## 2021-04-22 NOTE — TELEPHONE ENCOUNTER
GI RN- Patient is scheduled for 08/03/2021, please advised on Plavix orders.    Dr Jeffery Abbott MD

## 2021-04-23 ENCOUNTER — TELEPHONE (OUTPATIENT)
Dept: CARDIOLOGY | Age: 71
End: 2021-04-23

## 2021-04-28 RX ORDER — TEMAZEPAM 30 MG/1
CAPSULE ORAL
Qty: 30 CAPSULE | Refills: 0 | Status: SHIPPED | OUTPATIENT
Start: 2021-04-28 | End: 2021-05-28

## 2021-05-04 NOTE — TELEPHONE ENCOUNTER
Received fax response from Dr. Douglas Dior stating that patient may hold Plavix and Asprin for 3 days prior to procedure. Informed the patient & instructed to call with any questions/concerns to either the GI office/'s office.

## 2021-05-28 RX ORDER — TEMAZEPAM 30 MG/1
30 CAPSULE ORAL NIGHTLY PRN
Qty: 30 CAPSULE | Refills: 2 | Status: SHIPPED | OUTPATIENT
Start: 2021-05-28 | End: 2021-08-25

## 2021-07-02 NOTE — TELEPHONE ENCOUNTER
Last read by Humberto Norman at 10:59 AM on 6/29/2021. Postponed TE to remind the patient closer to August procedure date as well.

## 2021-07-26 NOTE — TELEPHONE ENCOUNTER
Called to remind patient, but both numbers state phone belongs to [email or web address]. As previously documented, patient is aware of instructions as read via [address]

## 2021-07-31 ENCOUNTER — PATIENT MESSAGE (OUTPATIENT)
Dept: GASTROENTEROLOGY | Facility: CLINIC | Age: 71
End: 2021-07-31

## 2021-08-02 NOTE — TELEPHONE ENCOUNTER
Instructed patient/wife to follow the prep instructions provided by the GI office, not the instructions provided on the back of the prep.

## 2021-08-02 NOTE — TELEPHONE ENCOUNTER
From: Barbara Ocasio  To: Génesis Hassan MD  Sent: 7/31/2021 10:42 AM CDT  Subject: Non-Urgent Medical Question    My  is having a colonoscopy on Tuesday.  Should we follow the instruction on the prep box or does the doctor want him to f

## 2021-08-03 ENCOUNTER — ANESTHESIA EVENT (OUTPATIENT)
Dept: ENDOSCOPY | Facility: HOSPITAL | Age: 71
End: 2021-08-03
Payer: MEDICARE

## 2021-08-03 ENCOUNTER — ANESTHESIA (OUTPATIENT)
Dept: ENDOSCOPY | Facility: HOSPITAL | Age: 71
End: 2021-08-03
Payer: MEDICARE

## 2021-08-03 ENCOUNTER — HOSPITAL ENCOUNTER (OUTPATIENT)
Facility: HOSPITAL | Age: 71
Setting detail: HOSPITAL OUTPATIENT SURGERY
Discharge: HOME | End: 2021-08-03
Attending: INTERNAL MEDICINE | Admitting: INTERNAL MEDICINE
Payer: MEDICARE

## 2021-08-03 VITALS
DIASTOLIC BLOOD PRESSURE: 87 MMHG | HEART RATE: 64 BPM | HEIGHT: 70 IN | TEMPERATURE: 98 F | WEIGHT: 200 LBS | SYSTOLIC BLOOD PRESSURE: 136 MMHG | OXYGEN SATURATION: 98 % | RESPIRATION RATE: 18 BRPM | BODY MASS INDEX: 28.63 KG/M2

## 2021-08-03 DIAGNOSIS — Z86.010 HISTORY OF COLON POLYPS: ICD-10-CM

## 2021-08-03 DIAGNOSIS — Z12.11 COLON CANCER SCREENING: ICD-10-CM

## 2021-08-03 LAB — POTASSIUM SERPL-SCNC: 3.5 MMOL/L (ref 3.5–5.1)

## 2021-08-03 PROCEDURE — 0DBK8ZX EXCISION OF ASCENDING COLON, VIA NATURAL OR ARTIFICIAL OPENING ENDOSCOPIC, DIAGNOSTIC: ICD-10-PCS | Performed by: INTERNAL MEDICINE

## 2021-08-03 PROCEDURE — 45385 COLONOSCOPY W/LESION REMOVAL: CPT | Performed by: INTERNAL MEDICINE

## 2021-08-03 PROCEDURE — 0DBP8ZX EXCISION OF RECTUM, VIA NATURAL OR ARTIFICIAL OPENING ENDOSCOPIC, DIAGNOSTIC: ICD-10-PCS | Performed by: INTERNAL MEDICINE

## 2021-08-03 RX ORDER — METOPROLOL TARTRATE 5 MG/5ML
INJECTION INTRAVENOUS AS NEEDED
Status: DISCONTINUED | OUTPATIENT
Start: 2021-08-03 | End: 2021-08-03 | Stop reason: SURG

## 2021-08-03 RX ORDER — SODIUM CHLORIDE, SODIUM LACTATE, POTASSIUM CHLORIDE, CALCIUM CHLORIDE 600; 310; 30; 20 MG/100ML; MG/100ML; MG/100ML; MG/100ML
INJECTION, SOLUTION INTRAVENOUS CONTINUOUS
Status: DISCONTINUED | OUTPATIENT
Start: 2021-08-03 | End: 2021-08-03

## 2021-08-03 RX ORDER — NALOXONE HYDROCHLORIDE 0.4 MG/ML
80 INJECTION, SOLUTION INTRAMUSCULAR; INTRAVENOUS; SUBCUTANEOUS AS NEEDED
Status: DISCONTINUED | OUTPATIENT
Start: 2021-08-03 | End: 2021-08-03

## 2021-08-03 RX ORDER — LIDOCAINE HYDROCHLORIDE 10 MG/ML
INJECTION, SOLUTION EPIDURAL; INFILTRATION; INTRACAUDAL; PERINEURAL AS NEEDED
Status: DISCONTINUED | OUTPATIENT
Start: 2021-08-03 | End: 2021-08-03 | Stop reason: SURG

## 2021-08-03 RX ADMIN — METOPROLOL TARTRATE 2.5 MG: 5 INJECTION INTRAVENOUS at 12:19:00

## 2021-08-03 RX ADMIN — SODIUM CHLORIDE, SODIUM LACTATE, POTASSIUM CHLORIDE, CALCIUM CHLORIDE: 600; 310; 30; 20 INJECTION, SOLUTION INTRAVENOUS at 12:05:00

## 2021-08-03 RX ADMIN — METOPROLOL TARTRATE 1 MG: 5 INJECTION INTRAVENOUS at 12:41:00

## 2021-08-03 RX ADMIN — SODIUM CHLORIDE, SODIUM LACTATE, POTASSIUM CHLORIDE, CALCIUM CHLORIDE: 600; 310; 30; 20 INJECTION, SOLUTION INTRAVENOUS at 12:35:00

## 2021-08-03 RX ADMIN — LIDOCAINE HYDROCHLORIDE 50 MG: 10 INJECTION, SOLUTION EPIDURAL; INFILTRATION; INTRACAUDAL; PERINEURAL at 12:07:00

## 2021-08-03 NOTE — ANESTHESIA POSTPROCEDURE EVALUATION
Patient: Marianna Baca    Procedure Summary     Date: 08/03/21 Room / Location: [address] [place] [address] ENDOSCOPY 04 / [address] [place] [address] ENDOSCOPY    Anesthesia Start: 7804 Anesthesia Stop:     Procedure: COLONOSCOPY (N/A ) Diagnosis:       History of colon polyps      Colon cancer

## 2021-08-03 NOTE — ADDENDUM NOTE
Addendum  created 08/03/21 1345 by Letty Sandhoff, CRNA    Clinical Note Signed, Review and Sign - Ready for Procedure

## 2021-08-03 NOTE — OPERATIVE REPORT
[facility] Endoscopy Report      Date of Procedure:  08/03/21      Preoperative Diagnosis:  Colorectal cancer screening      Postoperative Diagnosis:  1. Colon polyps  2.   Sigmoid colon diverticulosis      Procedure:    Colonoscopy with po no other colonic polyps, mass lesions, vascular anomalies or signs of inflammation seen. Retroflexion in the rectum revealed no abnormalities. Of note the patient had brief episodes of an atrial tachyarrhythmia with heart rates in the 130s.   The rate dec

## 2021-08-03 NOTE — PROGRESS NOTES
Pt HR fluctuating between normal sinus rhythm and Sinus tachycardia intra and post op (colonoscopy). 12 lead ekg and potassium level ordered. Dr. Jasmine Fortune aware. Pt denies chest pain, shortness of breath or palpitaions. Will continue to monitor.

## 2021-08-03 NOTE — H&P
History & Physical Examination    Patient Name: Sofia Moreno  MRN: [id number]  [facility]: [id number]  YOB: 1950    Diagnosis: Colorectal cancer screening      temazepam 30 MG Oral Cap, Take 1 capsule (30 mg total) by mouth nightly as needed hyperplasia)    • Colon polyp    • Erectile dysfunction    • Essential hypertension    • Heart attack ([address]     6 years ago   • Heart disease 2016   • High blood pressure    • High cholesterol    • History of colon polyps 4/2005   • Hypercholesterolemia

## 2021-08-03 NOTE — PROGRESS NOTES
EKG returned to baseline. Sinus Bradycardia with RBBB. Pt denies chest pain. No diaphoresis or shortness of breath. Potassium 3.5. Advised to follow up with cardiologist. Dr. Merline Diaz aware of results and agrees patient can be discharged to home.

## 2021-08-03 NOTE — ANESTHESIA PREPROCEDURE EVALUATION
Anesthesia PreOp Note    HPI:     Nick Forrester is a 66year old male who presents for preoperative consultation requested by: Addy Raphael MD    Date of Surgery: 8/3/2021    Procedure(s):  COLONOSCOPY  Indication: History of colon polyps, moderate-severe L3-L4, moderate L2-L3, mild-moderate L4-L5 and mild L1-L2 and L5-S1   • Osteoarthritis    • Right L1-2 synovical cyst 10/9/2018       Past Surgical History:   Procedure Laterality Date   • ANGIOPLASTY (CORONARY)  12-   • COLONOSCOPY Slime Casper MD    No current [facility]-ordered outpatient medications on file.       No Known Allergies    Family History   Problem Relation Age of Onset   • Other (COPD [Other]) Father    • Other (Brain Cancer [Other]) Mother    • Cancer Mother      Social History Physical Activity:       Days of Exercise per Week:       Minutes of Exercise per Session:   Stress:       Feeling of Stress :   Social Connections:       Frequency of Communication with Friends and Family:       Frequency of Social Gatherings with Shannon Ochoa All of the patient's questions were answered to the best of my ability. The patient desires the anesthetic management as planned. Nida Kirkland  8/3/2021 11:08 AM

## 2021-08-06 ENCOUNTER — TELEPHONE (OUTPATIENT)
Dept: GASTROENTEROLOGY | Facility: CLINIC | Age: 71
End: 2021-08-06

## 2021-08-06 RX ORDER — LISINOPRIL 20 MG/1
TABLET ORAL
Qty: 90 TABLET | Refills: 0 | Status: SHIPPED | OUTPATIENT
Start: 2021-08-06 | End: 2022-09-10

## 2021-08-06 NOTE — TELEPHONE ENCOUNTER
Recall colon in 5 years per Dr. Martínez Host. Last done:8/3/21  Next due:8/3/26    Updated health maintenance and pt outreach.

## 2021-08-06 NOTE — TELEPHONE ENCOUNTER
----- Message from Sparkle Styles MD sent at 8/5/2021  6:12 PM CDT -----  Hi Mr. Downs Annetta colonoscopy revealed #4 polyps which were removed. #2 of the polyps were adenomas.   These are the types of polyps that have a potential to become a tu

## 2021-08-20 ENCOUNTER — OFFICE VISIT (OUTPATIENT)
Dept: INTERNAL MEDICINE CLINIC | Facility: CLINIC | Age: 71
End: 2021-08-20
Payer: MEDICARE

## 2021-08-20 VITALS
SYSTOLIC BLOOD PRESSURE: 126 MMHG | RESPIRATION RATE: 16 BRPM | HEIGHT: 71 IN | WEIGHT: 195 LBS | HEART RATE: 68 BPM | DIASTOLIC BLOOD PRESSURE: 72 MMHG | BODY MASS INDEX: 27.3 KG/M2

## 2021-08-20 DIAGNOSIS — I10 ESSENTIAL HYPERTENSION: Primary | ICD-10-CM

## 2021-08-20 DIAGNOSIS — N50.3 EPIDIDYMAL CYST: ICD-10-CM

## 2021-08-20 DIAGNOSIS — I25.10 ASHD (ARTERIOSCLEROTIC HEART DISEASE): ICD-10-CM

## 2021-08-20 DIAGNOSIS — E78.00 HYPERCHOLESTEROLEMIA: ICD-10-CM

## 2021-08-20 PROCEDURE — 99214 OFFICE O/P EST MOD 30 MIN: CPT | Performed by: INTERNAL MEDICINE

## 2021-08-20 PROCEDURE — 3074F SYST BP LT 130 MM HG: CPT | Performed by: INTERNAL MEDICINE

## 2021-08-20 PROCEDURE — 3008F BODY MASS INDEX DOCD: CPT | Performed by: INTERNAL MEDICINE

## 2021-08-20 PROCEDURE — 3078F DIAST BP <80 MM HG: CPT | Performed by: INTERNAL MEDICINE

## 2021-08-20 NOTE — PROGRESS NOTES
Yulia Mendoza is a 66year old male. Patient presents with:  Hypertension  Hyperlipidemia    HPI:   Mr. Justino Murphy presents this morning for 6-month follow-up of chronic medical conditions including ASHD, hypertension and hypercholesterolemia. (LIPITOR) 80 MG Oral Tab Take 1 tablet (80 mg total) by mouth nightly. 90 tablet 1   • Clopidogrel Bisulfate 75 MG Oral Tab Take 1 tablet (75 mg total) by mouth daily.  Take one pill every other day 90 tablet 1   • aspirin 81 MG Oral Tab Take 81 mg by mouth drinks      Comment: 1-2 beers weekly    Drug use: No       EXAM:   GENERAL: Pleasant male appearing well in no distress  /72   Pulse 68   Resp 16   Ht 5' 11\" (1.803 m)   Wt 195 lb (88.5 kg)   BMI 27.20 kg/m²   LUNGS: Resonant to percussion and hermila

## 2021-08-20 NOTE — PATIENT INSTRUCTIONS
Your blood pressure today was excellent at 126/72. Please continue current medications. Continue healthy diet and regular physical activity. Remember to get a flu shot this fall. Schedule an appointment with Urology if you wish.   Please schedule a Medi

## 2021-08-25 RX ORDER — TEMAZEPAM 30 MG/1
30 CAPSULE ORAL NIGHTLY PRN
Qty: 30 CAPSULE | Refills: 0 | Status: SHIPPED | OUTPATIENT
Start: 2021-08-25 | End: 2021-09-23

## 2021-08-25 RX ORDER — TEMAZEPAM 30 MG/1
CAPSULE ORAL
Qty: 30 CAPSULE | Refills: 0 | OUTPATIENT
Start: 2021-08-25

## 2021-09-03 ENCOUNTER — IMMUNIZATION (OUTPATIENT)
Dept: LAB | Facility: HOSPITAL | Age: 71
End: 2021-09-03
Attending: EMERGENCY MEDICINE
Payer: MEDICARE

## 2021-09-03 DIAGNOSIS — Z23 NEED FOR VACCINATION: Primary | ICD-10-CM

## 2021-09-03 PROCEDURE — 0003A SARSCOV2 VAC 30MCG/0.3ML IM: CPT

## 2021-09-18 RX ORDER — GABAPENTIN 100 MG/1
200 CAPSULE ORAL 2 TIMES DAILY
Qty: 120 CAPSULE | Refills: 5 | Status: SHIPPED | OUTPATIENT
Start: 2021-09-18

## 2021-09-23 RX ORDER — TEMAZEPAM 30 MG/1
CAPSULE ORAL
Qty: 30 CAPSULE | Refills: 0 | Status: SHIPPED | OUTPATIENT
Start: 2021-09-23 | End: 2021-10-25

## 2021-10-20 ENCOUNTER — OFFICE VISIT (OUTPATIENT)
Dept: SURGERY | Facility: CLINIC | Age: 71
End: 2021-10-20
Payer: MEDICARE

## 2021-10-20 VITALS
HEIGHT: 71 IN | SYSTOLIC BLOOD PRESSURE: 122 MMHG | HEART RATE: 50 BPM | DIASTOLIC BLOOD PRESSURE: 75 MMHG | BODY MASS INDEX: 27.3 KG/M2 | WEIGHT: 195 LBS

## 2021-10-20 DIAGNOSIS — N40.1 BENIGN PROSTATIC HYPERPLASIA WITH WEAK URINARY STREAM: Primary | ICD-10-CM

## 2021-10-20 DIAGNOSIS — R39.14 FEELING OF INCOMPLETE BLADDER EMPTYING: ICD-10-CM

## 2021-10-20 DIAGNOSIS — Z79.01 ON CLOPIDOGREL THERAPY: ICD-10-CM

## 2021-10-20 DIAGNOSIS — Z79.82 LONG TERM (CURRENT) USE OF ASPIRIN: ICD-10-CM

## 2021-10-20 DIAGNOSIS — R39.15 URGENCY OF URINATION: ICD-10-CM

## 2021-10-20 DIAGNOSIS — N50.3 EPIDIDYMAL CYST: ICD-10-CM

## 2021-10-20 DIAGNOSIS — R39.12 BENIGN PROSTATIC HYPERPLASIA WITH WEAK URINARY STREAM: Primary | ICD-10-CM

## 2021-10-20 DIAGNOSIS — Z87.891 FORMER SMOKER: ICD-10-CM

## 2021-10-20 PROCEDURE — 3008F BODY MASS INDEX DOCD: CPT | Performed by: UROLOGY

## 2021-10-20 PROCEDURE — 3074F SYST BP LT 130 MM HG: CPT | Performed by: UROLOGY

## 2021-10-20 PROCEDURE — 3078F DIAST BP <80 MM HG: CPT | Performed by: UROLOGY

## 2021-10-20 PROCEDURE — 99205 OFFICE O/P NEW HI 60 MIN: CPT | Performed by: UROLOGY

## 2021-10-20 RX ORDER — TAMSULOSIN HYDROCHLORIDE 0.4 MG/1
0.4 CAPSULE ORAL DAILY
Qty: 90 CAPSULE | Refills: 3 | Status: SHIPPED | OUTPATIENT
Start: 2021-10-20 | End: 2021-11-19

## 2021-10-20 RX ORDER — HYDROCHLOROTHIAZIDE 25 MG/1
25 TABLET ORAL DAILY
Qty: 90 TABLET | Refills: 1 | Status: SHIPPED | OUTPATIENT
Start: 2021-10-20

## 2021-10-20 RX ORDER — ATORVASTATIN CALCIUM 80 MG/1
80 TABLET, FILM COATED ORAL NIGHTLY
Qty: 90 TABLET | Refills: 1 | Status: SHIPPED | OUTPATIENT
Start: 2021-10-20

## 2021-10-20 NOTE — PROGRESS NOTES
Frederick Mackey is a 66year old male. HPI:   Patient presents with:  Consult: Pt had US and it showed Epididymal cysts          History provided by Patient. Referred by Dr. Rodger Gates, PCP.     Epididymal Cysts  Identified on 08/16/2017 US Scrotum w/do \"  08/20/2021 Dr. Lila Lundy, [facility]; \" epididymal cysts, especially right -sided, which are \"annoying\"; he also notes a weak urinary stream; at his physical in February, prostate was enlarged and PSA was normal; Urology referral\"    Urological H Cigarettes        Quit date: 2013        Years since quittin.8      Smokeless tobacco: Never Used    Vaping Use      Vaping Use: Never used    Alcohol use: Yes      Alcohol/week: 0.0 standard drinks      Comment: 1-2 beers weekly    Drug use:  No heartbeat/palpitations  Constitutional:  Negative for decreased activity, fever, irritability and lethargy  ENMT:  Negative for ear drainage, hearing loss and nasal drainage  Eyes:  Negative for eye discharge and vision loss  Hema/Lymph:  Negative for easy abnormal bruising noted ; abdominal scars right inguinal hernia repair scar  Back/Spine: no abnormalities noted  Extremities: no cyanosis, or clubbing; edema none       10/20/21  1118   BP: 122/75   Pulse: 50         LABORATORIES    02/15/2021 PSA = 2.84; discussed starting tamsulosin 0.4 mg vs observarion. I fully explained to patient the benefits, risks, and alternatives to this treatment option and I answered patient's questions; patient decides to start tamsulosin 0.4 mg.  Patient will follow up in 3-4 m On clopidogrel therapy  Patient is on clopidogrel for coronary stent placement in 2013 and at risk for future surgical procedures and will need to hold medication prior to any surgical procedure.     (Z79.82) Long term (current) use of aspirin  Patient is performed the services described in this documentation. All medical record entries made by the scribe were at my direction and in my presence.   I have reviewed the chart and discharge instructions (if applicable) and agree that the record reflects my perso

## 2021-10-20 NOTE — PATIENT INSTRUCTIONS
Jermaine Sullivan M.D.    1.   Please submit urine specimen for complete urinalysis urine for cytology--we will notify you of the result      2.     Please start tamsulosin 0.4 mg daily--             purpose is to dilate

## 2021-10-25 RX ORDER — TEMAZEPAM 30 MG/1
CAPSULE ORAL
Qty: 30 CAPSULE | Refills: 0 | Status: SHIPPED | OUTPATIENT
Start: 2021-10-25 | End: 2021-11-22

## 2021-11-22 RX ORDER — TEMAZEPAM 30 MG/1
CAPSULE ORAL
Qty: 30 CAPSULE | Refills: 0 | Status: SHIPPED | OUTPATIENT
Start: 2021-11-22 | End: 2021-12-22

## 2021-12-10 ENCOUNTER — HOSPITAL ENCOUNTER (OUTPATIENT)
Dept: ULTRASOUND IMAGING | Facility: HOSPITAL | Age: 71
Discharge: HOME | End: 2021-12-10
Attending: UROLOGY
Payer: MEDICARE

## 2021-12-10 DIAGNOSIS — R39.14 FEELING OF INCOMPLETE BLADDER EMPTYING: ICD-10-CM

## 2021-12-10 DIAGNOSIS — N50.3 EPIDIDYMAL CYST: ICD-10-CM

## 2021-12-10 PROCEDURE — 93975 VASCULAR STUDY: CPT | Performed by: UROLOGY

## 2021-12-10 PROCEDURE — 76857 US EXAM PELVIC LIMITED: CPT | Performed by: UROLOGY

## 2021-12-10 PROCEDURE — 76870 US EXAM SCROTUM: CPT | Performed by: UROLOGY

## 2021-12-22 RX ORDER — TEMAZEPAM 30 MG/1
CAPSULE ORAL
Qty: 30 CAPSULE | Refills: 0 | Status: SHIPPED | OUTPATIENT
Start: 2021-12-22 | End: 2022-01-21

## 2022-01-11 ENCOUNTER — TELEPHONE (OUTPATIENT)
Dept: SURGERY | Facility: CLINIC | Age: 72
End: 2022-01-11

## 2022-01-13 ENCOUNTER — APPOINTMENT (OUTPATIENT)
Dept: GENERAL RADIOLOGY | Facility: HOSPITAL | Age: 72
End: 2022-01-13
Attending: EMERGENCY MEDICINE
Payer: MEDICARE

## 2022-01-13 ENCOUNTER — HOSPITAL ENCOUNTER (EMERGENCY)
Facility: HOSPITAL | Age: 72
Discharge: HOME | End: 2022-01-13
Attending: EMERGENCY MEDICINE
Payer: MEDICARE

## 2022-01-13 ENCOUNTER — APPOINTMENT (OUTPATIENT)
Dept: MRI IMAGING | Facility: HOSPITAL | Age: 72
End: 2022-01-13
Attending: EMERGENCY MEDICINE
Payer: MEDICARE

## 2022-01-13 ENCOUNTER — APPOINTMENT (OUTPATIENT)
Dept: CT IMAGING | Facility: HOSPITAL | Age: 72
End: 2022-01-13
Attending: EMERGENCY MEDICINE
Payer: MEDICARE

## 2022-01-13 VITALS
SYSTOLIC BLOOD PRESSURE: 138 MMHG | HEART RATE: 56 BPM | WEIGHT: 180 LBS | HEIGHT: 71 IN | DIASTOLIC BLOOD PRESSURE: 86 MMHG | TEMPERATURE: 100 F | OXYGEN SATURATION: 96 % | RESPIRATION RATE: 18 BRPM | BODY MASS INDEX: 25.2 KG/M2

## 2022-01-13 DIAGNOSIS — U07.1 COVID-19: Primary | ICD-10-CM

## 2022-01-13 LAB
ALBUMIN SERPL-MCNC: 3.7 G/DL (ref 3.4–5)
ALBUMIN/GLOB SERPL: 1.1 {RATIO} (ref 1–2)
ALP LIVER SERPL-CCNC: 97 U/L
ALT SERPL-CCNC: 11 U/L
ANION GAP SERPL CALC-SCNC: 8 MMOL/L (ref 0–18)
AST SERPL-CCNC: 17 U/L (ref 15–37)
BASOPHILS # BLD AUTO: 0.02 X10(3) UL (ref 0–0.2)
BASOPHILS NFR BLD AUTO: 0.3 %
BILIRUB SERPL-MCNC: 0.5 MG/DL (ref 0.1–2)
BILIRUB UR QL: NEGATIVE
BUN BLD-MCNC: 15 MG/DL (ref 7–18)
BUN/CREAT SERPL: 10.9 (ref 10–20)
CALCIUM BLD-MCNC: 9.2 MG/DL (ref 8.5–10.1)
CHLORIDE SERPL-SCNC: 98 MMOL/L (ref 98–112)
CLARITY UR: CLEAR
CO2 SERPL-SCNC: 28 MMOL/L (ref 21–32)
COLOR UR: YELLOW
CREAT BLD-MCNC: 1.37 MG/DL
DEPRECATED RDW RBC AUTO: 41.4 FL (ref 35.1–46.3)
EOSINOPHIL # BLD AUTO: 0 X10(3) UL (ref 0–0.7)
EOSINOPHIL NFR BLD AUTO: 0 %
ERYTHROCYTE [DISTWIDTH] IN BLOOD BY AUTOMATED COUNT: 11.8 % (ref 11–15)
GLOBULIN PLAS-MCNC: 3.4 G/DL (ref 2.8–4.4)
GLUCOSE BLD-MCNC: 127 MG/DL (ref 70–99)
GLUCOSE UR-MCNC: NEGATIVE MG/DL
HCT VFR BLD AUTO: 44.7 %
HGB BLD-MCNC: 15.3 G/DL
HGB UR QL STRIP.AUTO: NEGATIVE
IMM GRANULOCYTES # BLD AUTO: 0.02 X10(3) UL (ref 0–1)
IMM GRANULOCYTES NFR BLD: 0.3 %
KETONES UR-MCNC: NEGATIVE MG/DL
LACTATE SERPL-SCNC: 2 MMOL/L (ref 0.4–2)
LACTATE SERPL-SCNC: 3 MMOL/L (ref 0.4–2)
LYMPHOCYTES # BLD AUTO: 0.64 X10(3) UL (ref 1–4)
LYMPHOCYTES NFR BLD AUTO: 9.2 %
MCH RBC QN AUTO: 32.3 PG (ref 26–34)
MCHC RBC AUTO-ENTMCNC: 34.2 G/DL (ref 31–37)
MCV RBC AUTO: 94.3 FL
MONOCYTES # BLD AUTO: 0.89 X10(3) UL (ref 0.1–1)
MONOCYTES NFR BLD AUTO: 12.8 %
NEUTROPHILS # BLD AUTO: 5.39 X10 (3) UL (ref 1.5–7.7)
NEUTROPHILS # BLD AUTO: 5.39 X10(3) UL (ref 1.5–7.7)
NEUTROPHILS NFR BLD AUTO: 77.4 %
NITRITE UR QL STRIP.AUTO: NEGATIVE
OSMOLALITY SERPL CALC.SUM OF ELEC: 280 MOSM/KG (ref 275–295)
PH UR: 5 [PH] (ref 5–8)
PLATELET # BLD AUTO: 174 10(3)UL (ref 150–450)
POTASSIUM SERPL-SCNC: 3.2 MMOL/L (ref 3.5–5.1)
PROT SERPL-MCNC: 7.1 G/DL (ref 6.4–8.2)
PROT UR-MCNC: NEGATIVE MG/DL
RBC # BLD AUTO: 4.74 X10(6)UL
SARS-COV-2 RNA RESP QL NAA+PROBE: DETECTED
SODIUM SERPL-SCNC: 134 MMOL/L (ref 136–145)
SP GR UR STRIP: 1.01 (ref 1–1.03)
UROBILINOGEN UR STRIP-ACNC: <2
WBC # BLD AUTO: 7 X10(3) UL (ref 4–11)

## 2022-01-13 PROCEDURE — 87086 URINE CULTURE/COLONY COUNT: CPT | Performed by: EMERGENCY MEDICINE

## 2022-01-13 PROCEDURE — 80053 COMPREHEN METABOLIC PANEL: CPT | Performed by: EMERGENCY MEDICINE

## 2022-01-13 PROCEDURE — 85025 COMPLETE CBC W/AUTO DIFF WBC: CPT | Performed by: EMERGENCY MEDICINE

## 2022-01-13 PROCEDURE — 99285 EMERGENCY DEPT VISIT HI MDM: CPT

## 2022-01-13 PROCEDURE — 83605 ASSAY OF LACTIC ACID: CPT | Performed by: EMERGENCY MEDICINE

## 2022-01-13 PROCEDURE — 96360 HYDRATION IV INFUSION INIT: CPT

## 2022-01-13 PROCEDURE — 70551 MRI BRAIN STEM W/O DYE: CPT | Performed by: EMERGENCY MEDICINE

## 2022-01-13 PROCEDURE — 81001 URINALYSIS AUTO W/SCOPE: CPT | Performed by: EMERGENCY MEDICINE

## 2022-01-13 PROCEDURE — 96361 HYDRATE IV INFUSION ADD-ON: CPT

## 2022-01-13 PROCEDURE — 70450 CT HEAD/BRAIN W/O DYE: CPT | Performed by: EMERGENCY MEDICINE

## 2022-01-13 PROCEDURE — 71045 X-RAY EXAM CHEST 1 VIEW: CPT | Performed by: EMERGENCY MEDICINE

## 2022-01-13 NOTE — ED PROVIDER NOTES
Patient Seen in: [facility] AND [facility] Emergency Department    History   Patient presents with:  Fatigue    Stated Complaint: weakness     HPI     67 yo M with PMH CAD c/b ICM with TTE ten months ago demonstrating 45% EF, HTN, HL presenting for evaluation of g Cap,  Take 2 capsules (200 mg total) by mouth 2 (two) times daily. LISINOPRIL 20 MG Oral Tab,  TAKE ONE TABLET BY MOUTH ONE TIME DAILY    Metoprolol Succinate ER 25 MG Oral Tablet 24 Hr,  Take 1 tablet (25 mg total) by mouth daily.    Clopidogrel Bisulfat kg/m²         Physical Exam   Constitutional: No distress. HEENT: Dry MM. Head: Normocephalic. Eyes: No injection. No photophobia. Neck: Neck supple. No meningismus. Cardiovascular: RRR. Pulmonary/Chest: Effort normal. CTAB. Abdominal: Soft.  Nont INDICATIONS: weakness  TECHNIQUE: CT images were obtained without contrast material.  Automated exposure control for dose reduction was used.   Dose information is transmitted to the [facility] ([address] [place] [address] of Radiology) Eliana Rivas [address] ([address] [place] [address] FINDINGS:  CARDIAC/VASC: Normal.  No cardiac silhouette abnormality or cardiomegaly. Unremarkable pulmonary vasculature. MEDIAST/BEVERLEY:   No visible mass or adenopathy. LUNGS/PLEURA: Moderate hyperinflation compatible with COPD changes.   Mild bibasilar sc fluid.  ORBITS: Limited views are unremarkable. OTHER: No restricted diffusion. Flow is present in the anterior and posterior circulation vessels. CONCLUSION:  1. No acute infarct or hemorrhage.  2. 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 COVID noted, patient without respiratory distress/hypoxia and chest ray unremarkable with patient amenable to antibody therapy.   CT head as noted, MRI obtained and nonacute; stable for discharge with

## 2022-01-13 NOTE — ED INITIAL ASSESSMENT (HPI)
Patient to ER from home via ems for c/o generalized weakness starting today. Patient denies pain or fall.

## 2022-01-14 ENCOUNTER — TELEPHONE (OUTPATIENT)
Dept: INTERNAL MEDICINE CLINIC | Facility: CLINIC | Age: 72
End: 2022-01-14

## 2022-01-14 NOTE — ED QUICK NOTES
The patient is cleared for discharge per Emergency Department physician. Discharge instructions were reviewed with patient including when and how to follow up with healthcare providers and when to seek emergency care. Peripheral IV discontinued.  The janeth

## 2022-01-14 NOTE — TELEPHONE ENCOUNTER
Patient was seen at [facility] ER on 1/13 and Received MAB     Left message to call back    Needs to have home monitoring

## 2022-01-15 RX ORDER — CLOPIDOGREL BISULFATE 75 MG/1
75 TABLET ORAL DAILY
Qty: 90 TABLET | Refills: 1 | Status: SHIPPED | OUTPATIENT
Start: 2022-01-15

## 2022-01-15 RX ORDER — METOPROLOL SUCCINATE 25 MG/1
25 TABLET, EXTENDED RELEASE ORAL DAILY
Qty: 90 TABLET | Refills: 1 | Status: SHIPPED | OUTPATIENT
Start: 2022-01-15

## 2022-01-17 ENCOUNTER — PATIENT MESSAGE (OUTPATIENT)
Dept: OTHER | Age: 72
End: 2022-01-17

## 2022-01-17 NOTE — TELEPHONE ENCOUNTER
Post MAB Day 4    Left message to call back and sent mychart msg with Covid recommendations and isolation guidelines. 3 attempts to reach patient.

## 2022-01-18 NOTE — TELEPHONE ENCOUNTER
From: Armando Miranda  To:  Cassie Kearney  Sent: 1/17/2022 3:09 PM CST  Subject: Covid Recommendations    Hello,  We have been unable to reach you by phone for follow up on your symptoms.  Please call the office to speak with a nurse if you need help t should continue to wear a mask around others for days 6-10. Hope you feel better soon.

## 2022-01-21 RX ORDER — TEMAZEPAM 30 MG/1
CAPSULE ORAL
Qty: 30 CAPSULE | Refills: 0 | Status: SHIPPED | OUTPATIENT
Start: 2022-01-21

## 2022-01-21 NOTE — TELEPHONE ENCOUNTER
Please review; protocol failed/No Protcol    Requested Prescriptions   Pending Prescriptions Disp Refills    TEMAZEPAM 30 MG Oral Cap [Pharmacy Med Name: Temazepam 30 Mg Cap Spec] 30 capsule 0     Sig: TAKE ONE CAPSULE BY MOUTH AT BEDTIME AS NEEDED FOR SLE

## 2022-02-09 ENCOUNTER — TELEPHONE (OUTPATIENT)
Dept: INTERNAL MEDICINE CLINIC | Facility: CLINIC | Age: 72
End: 2022-02-09

## 2022-02-09 NOTE — TELEPHONE ENCOUNTER
Patient's spouse dropped off parking placard. Please call patient when form is ready for pickup. Patient's spouse made aware Dr Andrea Velásquez will be back in office next week.

## 2022-02-14 NOTE — TELEPHONE ENCOUNTER
Left message for patient to call office back, please transfer call to ext [phone] when he calls office back. Parking placard form available for  and  placed at [facility] 2nd floor .

## 2022-02-14 NOTE — TELEPHONE ENCOUNTER
Form completed and signed and placed in my bin at the [place] [address] nurses [facility]

## 2022-02-18 RX ORDER — TEMAZEPAM 30 MG/1
30 CAPSULE ORAL NIGHTLY PRN
Qty: 30 CAPSULE | Refills: 0 | Status: SHIPPED | OUTPATIENT
Start: 2022-02-18 | End: 2022-03-20

## 2022-02-18 NOTE — TELEPHONE ENCOUNTER
Please review. Protocol failed / No protocol.     Requested Prescriptions   Pending Prescriptions Disp Refills    TEMAZEPAM 30 MG Oral Cap [Pharmacy Med Name: Temazepam 30 Mg Cap Spec] 30 capsule 0     Sig: TAKE ONE CAPSULE BY MOUTH AT BEDTIME AS NEEDED FOR SLEEP        There is no refill protocol information for this order           Future Appointments         Provider Department Appt Notes    In 3 days Jamey Anderson MD [place] [facility] [place], [facility], [address], [place] 6 mos f/up            Recent Outpatient Visits              4 months ago Benign prostatic hyperplasia with weak urinary stream    [facility] for Edilma Rai MD    Office Visit    6 months ago Essential hypertension    [address], Mandeep Lambert MD    Office Visit    10 months ago History of colon polyps    Jasmin Maxwell MD    Office Visit    1 year ago Annual physical exam    Devaughn Alvarez MD    Office Visit    1 year ago ASHD (arteriosclerotic heart disease)    [place] [facility] [place], [facility], [address], Mandeep Lambert MD    Office Visit

## 2022-02-21 ENCOUNTER — OFFICE VISIT (OUTPATIENT)
Dept: INTERNAL MEDICINE CLINIC | Facility: CLINIC | Age: 72
End: 2022-02-21
Payer: MEDICARE

## 2022-02-21 VITALS
HEART RATE: 59 BPM | BODY MASS INDEX: 27.77 KG/M2 | SYSTOLIC BLOOD PRESSURE: 126 MMHG | DIASTOLIC BLOOD PRESSURE: 72 MMHG | HEIGHT: 71 IN | WEIGHT: 198.38 LBS

## 2022-02-21 DIAGNOSIS — I10 ESSENTIAL HYPERTENSION: ICD-10-CM

## 2022-02-21 DIAGNOSIS — I25.10 ASHD (ARTERIOSCLEROTIC HEART DISEASE): ICD-10-CM

## 2022-02-21 DIAGNOSIS — U07.1 ACUTE COVID-19: Primary | ICD-10-CM

## 2022-02-21 PROCEDURE — 3078F DIAST BP <80 MM HG: CPT | Performed by: INTERNAL MEDICINE

## 2022-02-21 PROCEDURE — 3008F BODY MASS INDEX DOCD: CPT | Performed by: INTERNAL MEDICINE

## 2022-02-21 PROCEDURE — 99214 OFFICE O/P EST MOD 30 MIN: CPT | Performed by: INTERNAL MEDICINE

## 2022-02-21 PROCEDURE — 3074F SYST BP LT 130 MM HG: CPT | Performed by: INTERNAL MEDICINE

## 2022-02-21 RX ORDER — TAMSULOSIN HYDROCHLORIDE 0.4 MG/1
0.4 CAPSULE ORAL DAILY
COMMUNITY
Start: 2022-01-12

## 2022-02-21 NOTE — PATIENT INSTRUCTIONS
Your blood pressure today was excellent at 126/72. Please continue your current medications. Please schedule a Medicare physical in 1 month.

## 2022-03-20 RX ORDER — TEMAZEPAM 30 MG/1
CAPSULE ORAL
Qty: 30 CAPSULE | Refills: 0 | Status: SHIPPED | OUTPATIENT
Start: 2022-03-20

## 2022-03-21 ENCOUNTER — OFFICE VISIT (OUTPATIENT)
Dept: INTERNAL MEDICINE CLINIC | Facility: CLINIC | Age: 72
End: 2022-03-21
Payer: MEDICARE

## 2022-03-21 VITALS
HEART RATE: 56 BPM | BODY MASS INDEX: 27.58 KG/M2 | SYSTOLIC BLOOD PRESSURE: 126 MMHG | DIASTOLIC BLOOD PRESSURE: 70 MMHG | HEIGHT: 71 IN | WEIGHT: 197 LBS

## 2022-03-21 DIAGNOSIS — E78.00 HYPERCHOLESTEROLEMIA: ICD-10-CM

## 2022-03-21 DIAGNOSIS — I25.10 ASHD (ARTERIOSCLEROTIC HEART DISEASE): ICD-10-CM

## 2022-03-21 DIAGNOSIS — I25.5 ISCHEMIC CARDIOMYOPATHY: ICD-10-CM

## 2022-03-21 DIAGNOSIS — I10 ESSENTIAL HYPERTENSION: ICD-10-CM

## 2022-03-21 DIAGNOSIS — Z86.010 HISTORY OF COLON POLYPS: ICD-10-CM

## 2022-03-21 DIAGNOSIS — Z00.00 ENCOUNTER FOR ANNUAL HEALTH EXAMINATION: ICD-10-CM

## 2022-03-21 DIAGNOSIS — N40.0 BENIGN PROSTATIC HYPERPLASIA WITHOUT LOWER URINARY TRACT SYMPTOMS: ICD-10-CM

## 2022-03-21 DIAGNOSIS — I70.0 ATHEROSCLEROSIS OF AORTA (HCC): ICD-10-CM

## 2022-03-21 DIAGNOSIS — J44.9 CHRONIC OBSTRUCTIVE PULMONARY DISEASE, UNSPECIFIED COPD TYPE (HCC): ICD-10-CM

## 2022-03-21 DIAGNOSIS — Z00.00 ANNUAL PHYSICAL EXAM: Primary | ICD-10-CM

## 2022-03-21 PROCEDURE — 3078F DIAST BP <80 MM HG: CPT | Performed by: INTERNAL MEDICINE

## 2022-03-21 PROCEDURE — G0439 PPPS, SUBSEQ VISIT: HCPCS | Performed by: INTERNAL MEDICINE

## 2022-03-21 PROCEDURE — 3074F SYST BP LT 130 MM HG: CPT | Performed by: INTERNAL MEDICINE

## 2022-03-21 PROCEDURE — 96160 PT-FOCUSED HLTH RISK ASSMT: CPT | Performed by: INTERNAL MEDICINE

## 2022-03-21 PROCEDURE — 99397 PER PM REEVAL EST PAT 65+ YR: CPT | Performed by: INTERNAL MEDICINE

## 2022-03-21 PROCEDURE — 3008F BODY MASS INDEX DOCD: CPT | Performed by: INTERNAL MEDICINE

## 2022-03-28 ENCOUNTER — LAB ENCOUNTER (OUTPATIENT)
Dept: LAB | Facility: HOSPITAL | Age: 72
End: 2022-03-28
Attending: INTERNAL MEDICINE
Payer: MEDICARE

## 2022-03-28 DIAGNOSIS — Z00.00 ANNUAL PHYSICAL EXAM: ICD-10-CM

## 2022-03-28 LAB
ALBUMIN SERPL-MCNC: 3.6 G/DL (ref 3.4–5)
ALBUMIN/GLOB SERPL: 1.3 {RATIO} (ref 1–2)
ALP LIVER SERPL-CCNC: 103 U/L
ALT SERPL-CCNC: 12 U/L
ANION GAP SERPL CALC-SCNC: 6 MMOL/L (ref 0–18)
AST SERPL-CCNC: 18 U/L (ref 15–37)
BILIRUB SERPL-MCNC: 0.9 MG/DL (ref 0.1–2)
BUN BLD-MCNC: 17 MG/DL (ref 7–18)
BUN/CREAT SERPL: 12.9 (ref 10–20)
CALCIUM BLD-MCNC: 9.4 MG/DL (ref 8.5–10.1)
CHLORIDE SERPL-SCNC: 100 MMOL/L (ref 98–112)
CHOLEST SERPL-MCNC: 122 MG/DL (ref ?–200)
CO2 SERPL-SCNC: 32 MMOL/L (ref 21–32)
COMPLEXED PSA SERPL-MCNC: 2.66 NG/ML (ref ?–4)
CREAT BLD-MCNC: 1.32 MG/DL
FASTING PATIENT LIPID ANSWER: YES
FASTING STATUS PATIENT QL REPORTED: YES
GLOBULIN PLAS-MCNC: 2.8 G/DL (ref 2.8–4.4)
GLUCOSE BLD-MCNC: 83 MG/DL (ref 70–99)
HDLC SERPL-MCNC: 41 MG/DL (ref 40–59)
LDLC SERPL CALC-MCNC: 62 MG/DL (ref ?–100)
NONHDLC SERPL-MCNC: 81 MG/DL (ref ?–130)
OSMOLALITY SERPL CALC.SUM OF ELEC: 287 MOSM/KG (ref 275–295)
POTASSIUM SERPL-SCNC: 4.3 MMOL/L (ref 3.5–5.1)
PROT SERPL-MCNC: 6.4 G/DL (ref 6.4–8.2)
SODIUM SERPL-SCNC: 138 MMOL/L (ref 136–145)
TRIGL SERPL-MCNC: 102 MG/DL (ref 30–149)
VLDLC SERPL CALC-MCNC: 15 MG/DL (ref 0–30)

## 2022-03-28 PROCEDURE — 80053 COMPREHEN METABOLIC PANEL: CPT

## 2022-03-28 PROCEDURE — 36415 COLL VENOUS BLD VENIPUNCTURE: CPT

## 2022-03-28 PROCEDURE — 80061 LIPID PANEL: CPT

## 2022-04-04 RX ORDER — GABAPENTIN 100 MG/1
200 CAPSULE ORAL 2 TIMES DAILY
Qty: 120 CAPSULE | Refills: 5 | Status: SHIPPED | OUTPATIENT
Start: 2022-04-04

## 2022-04-09 RX ORDER — ATORVASTATIN CALCIUM 80 MG/1
80 TABLET, FILM COATED ORAL EVERY EVENING
Qty: 90 TABLET | Refills: 1 | Status: SHIPPED | OUTPATIENT
Start: 2022-04-09

## 2022-04-09 NOTE — TELEPHONE ENCOUNTER
Refill passed per [facility] protocol.   Requested Prescriptions   Pending Prescriptions Disp Refills    ATORVASTATIN 80 MG Oral Tab [Pharmacy Med Name: Atorvastatin Calcium 80 Mg Tab Nort] 90 tablet 0     Sig: TAKE ONE TABLET BY MOUTH IN THE EVENING        Cholesterol Medication Protocol Passed - 4/9/2022  2:33 AM        Passed - ALT in past 12 months        Passed - LDL in past 12 months        Passed - Last ALT < 80       Lab Results   Component Value Date    ALT 12 (L) 03/28/2022             Passed - Last LDL < 130     Lab Results   Component Value Date    LDL 62 03/28/2022               Passed - Appointment in past 12 or next 3 months            Recent Outpatient Visits              2 weeks ago Annual physical exam    Alessandro Wiggins MD    Office Visit    1 month ago Acute COVID-19    Alessandro Wiggins MD    Office Visit    5 months ago Benign prostatic hyperplasia with weak urinary stream    [facility] [place] [facility] for Lulu Leach MD    Office Visit    7 months ago Essential hypertension    [facility], Mei Sorensen MD    Office Visit    11 months ago History of colon polyps    Esmer Pyle MD    Office Visit          Future Appointments         Provider Department Appt Notes    In 5 months Tish Sands MD [facility], Zohreh Sorensen 6 mo f/u

## 2022-04-20 RX ORDER — TEMAZEPAM 30 MG/1
30 CAPSULE ORAL NIGHTLY PRN
Qty: 30 CAPSULE | Refills: 0 | Status: SHIPPED | OUTPATIENT
Start: 2022-04-20

## 2022-04-20 NOTE — TELEPHONE ENCOUNTER
Please review; protocol failed/no protocol    Requested Prescriptions   Pending Prescriptions Disp Refills    TEMAZEPAM 30 MG Oral Cap [Pharmacy Med Name: Temazepam 30 Mg Cap Spec] 30 capsule 0     Sig: TAKE ONE CAPSULE BY MOUTH AT BEDTIME AS NEEDED FOR SLEEP        There is no refill protocol information for this order            Recent Outpatient Visits              1 month ago Annual physical exam    Anson Russell MD    Office Visit    1 month ago Acute COVID-19    Anson Russell MD    Office Visit    6 months ago Benign prostatic hyperplasia with weak urinary stream    [facility] [place] [facility] Oral Pressley MD    Office Visit    8 months ago Essential hypertension    [address], [address], Hipolito Escobedo MD    Office Visit    12 months ago History of colon polyps    Nancy Dobbins MD    Office Visit             Future Appointments         Provider Department Appt Notes    In 5 months Douglas Pham MD [address], [address], [place] 6 mo f/u

## 2022-04-22 ENCOUNTER — TELEPHONE (OUTPATIENT)
Dept: INTERNAL MEDICINE CLINIC | Facility: CLINIC | Age: 72
End: 2022-04-22

## 2022-04-22 NOTE — TELEPHONE ENCOUNTER
Dr. Lana Holliday please advise patient is looking to get letter excusing him from [facility]

## 2022-04-22 NOTE — TELEPHONE ENCOUNTER
Dr. Stanislav Gaona has sent the letter to patient through [address] [place] [address] [address]

## 2022-05-01 RX ORDER — HYDROCHLOROTHIAZIDE 25 MG/1
25 TABLET ORAL DAILY
Qty: 90 TABLET | Refills: 1 | Status: SHIPPED | OUTPATIENT
Start: 2022-05-01 | End: 2022-10-28

## 2022-05-01 NOTE — TELEPHONE ENCOUNTER
Refill passed per [facility] protocol.     Requested Prescriptions   Pending Prescriptions Disp Refills    HYDROCHLOROTHIAZIDE 25 MG Oral Tab [Pharmacy Med Name: Hydrochlorothiazide 25 Mg Tab Teva] 90 tablet 0     Sig: TAKE ONE TABLET BY MOUTH ONE TIME DAILY        Hypertensive Medications Protocol Passed - 5/1/2022 12:49 PM        Passed - CMP or BMP in past 12 months        Passed - Appointment in past 6 or next 3 months        Passed - GFR Non- > 50     Lab Results   Component Value Date    GFRNAA 48 (L) 03/28/2022                       Recent Outpatient Visits              1 month ago Annual physical exam    Jessee Farnsworth MD    Office Visit    2 months ago Acute COVID-19    Jessee Farnsworth MD    Office Visit    6 months ago Benign prostatic hyperplasia with weak urinary stream    [facility] [place] [facility] for Abbey De Paz MD    Office Visit    8 months ago Essential hypertension    [facility], [address], Jasmin Tate MD    Office Visit    1 year ago History of colon Landen MD Tyrel    Office Visit            Future Appointments         Provider Department Appt Notes    In 4 months Gopal Pittman MD [facility], [address], [place] 6 mo f/u

## 2022-05-16 RX ORDER — TEMAZEPAM 30 MG/1
CAPSULE ORAL
Qty: 30 CAPSULE | Refills: 0 | Status: SHIPPED | OUTPATIENT
Start: 2022-05-16

## 2022-06-15 RX ORDER — TEMAZEPAM 30 MG/1
CAPSULE ORAL
Qty: 30 CAPSULE | Refills: 0 | Status: SHIPPED | OUTPATIENT
Start: 2022-06-15

## 2022-07-13 RX ORDER — TEMAZEPAM 30 MG/1
CAPSULE ORAL
Qty: 30 CAPSULE | Refills: 0 | Status: SHIPPED | OUTPATIENT
Start: 2022-07-13

## 2022-07-19 ENCOUNTER — TELEPHONE (OUTPATIENT)
Dept: INTERNAL MEDICINE CLINIC | Facility: CLINIC | Age: 72
End: 2022-07-19

## 2022-07-19 DIAGNOSIS — M48.062 LUMBAR STENOSIS WITH NEUROGENIC CLAUDICATION: Primary | ICD-10-CM

## 2022-07-19 NOTE — TELEPHONE ENCOUNTER
Claudio Hardwick called asking for an MRI of the spine. Patient is trying to schedule an appointment with Randall Moser neurosurgeon at [facility] for spinal stenosis but can't get an appointment until he has the MRI done.  Please advise

## 2022-07-27 RX ORDER — METOPROLOL SUCCINATE 25 MG/1
25 TABLET, EXTENDED RELEASE ORAL DAILY
Qty: 90 TABLET | Refills: 1 | Status: SHIPPED | OUTPATIENT
Start: 2022-07-27

## 2022-08-09 ENCOUNTER — TELEPHONE (OUTPATIENT)
Dept: CASE MANAGEMENT | Age: 72
End: 2022-08-09

## 2022-08-09 NOTE — TELEPHONE ENCOUNTER
The health plan has denied request for MRI Lumbar Spine. You may reach out to [facility] for a peer review at [phone] and reference case # [id number]. Listed below is the denial rationale. Also, please reach out to patient with plan of care. Patient is scheduled to have study done 08/12/2022.      Thank you,   Korey

## 2022-08-10 RX ORDER — TEMAZEPAM 30 MG/1
CAPSULE ORAL
Qty: 30 CAPSULE | Refills: 0 | Status: SHIPPED | OUTPATIENT
Start: 2022-08-10

## 2022-08-12 ENCOUNTER — OFFICE VISIT (OUTPATIENT)
Dept: INTERNAL MEDICINE CLINIC | Facility: CLINIC | Age: 72
End: 2022-08-12
Payer: MEDICARE

## 2022-08-12 VITALS
HEIGHT: 71 IN | DIASTOLIC BLOOD PRESSURE: 62 MMHG | RESPIRATION RATE: 16 BRPM | SYSTOLIC BLOOD PRESSURE: 124 MMHG | BODY MASS INDEX: 26.27 KG/M2 | WEIGHT: 187.69 LBS | HEART RATE: 50 BPM

## 2022-08-12 DIAGNOSIS — I10 ESSENTIAL HYPERTENSION: ICD-10-CM

## 2022-08-12 DIAGNOSIS — M48.061 SPINAL STENOSIS OF LUMBAR REGION WITHOUT NEUROGENIC CLAUDICATION: Primary | ICD-10-CM

## 2022-08-12 PROCEDURE — 3078F DIAST BP <80 MM HG: CPT | Performed by: INTERNAL MEDICINE

## 2022-08-12 PROCEDURE — 3074F SYST BP LT 130 MM HG: CPT | Performed by: INTERNAL MEDICINE

## 2022-08-12 PROCEDURE — 1125F AMNT PAIN NOTED PAIN PRSNT: CPT | Performed by: INTERNAL MEDICINE

## 2022-08-12 PROCEDURE — 3008F BODY MASS INDEX DOCD: CPT | Performed by: INTERNAL MEDICINE

## 2022-08-12 PROCEDURE — 99214 OFFICE O/P EST MOD 30 MIN: CPT | Performed by: INTERNAL MEDICINE

## 2022-08-12 NOTE — PATIENT INSTRUCTIONS
Please schedule physical therapy and a follow-up visit with Dr. Jake Alas. Return visit in 6 months.

## 2022-09-01 ENCOUNTER — TELEPHONE (OUTPATIENT)
Dept: PHYSICAL THERAPY | Facility: HOSPITAL | Age: 72
End: 2022-09-01

## 2022-09-08 ENCOUNTER — TELEPHONE (OUTPATIENT)
Dept: PHYSICAL THERAPY | Facility: HOSPITAL | Age: 72
End: 2022-09-08

## 2022-09-09 ENCOUNTER — OFFICE VISIT (OUTPATIENT)
Dept: PHYSICAL THERAPY | Facility: HOSPITAL | Age: 72
End: 2022-09-09
Attending: INTERNAL MEDICINE
Payer: MEDICARE

## 2022-09-09 DIAGNOSIS — M48.061 SPINAL STENOSIS OF LUMBAR REGION WITHOUT NEUROGENIC CLAUDICATION: ICD-10-CM

## 2022-09-09 PROCEDURE — 97162 PT EVAL MOD COMPLEX 30 MIN: CPT

## 2022-09-10 RX ORDER — LISINOPRIL 20 MG/1
20 TABLET ORAL DAILY
Qty: 90 TABLET | Refills: 1 | Status: SHIPPED | OUTPATIENT
Start: 2022-09-10

## 2022-09-12 RX ORDER — TEMAZEPAM 30 MG/1
30 CAPSULE ORAL NIGHTLY PRN
Qty: 30 CAPSULE | Refills: 0 | Status: SHIPPED | OUTPATIENT
Start: 2022-09-12

## 2022-09-12 NOTE — TELEPHONE ENCOUNTER
Protocol failed or has No Protocol, please review      Last office visit = 8/12   Last refill = 8/10    Requested Prescriptions   Pending Prescriptions Disp Refills    TEMAZEPAM 30 MG Oral Cap [Pharmacy Med Name: Temazepam 30 Mg Cap Spec] 30 capsule 0     Sig: TAKE ONE CAPSULE BY MOUTH AT BEDTIME AS NEEDED FOR SLEEP        There is no refill protocol information for this order         Future Appointments         Provider Department Appt Notes    In 4 days Meka Cisse,  [facility] PPO  c/p $40, no auth, $2150 limit(pt &st)    In 1 week Meka Cisse,  [facility] PPO  c/p $40, no auth, $2150 limit(pt &st)    In 1 week Joelle Blair MD [address] 6 mo f/u -    In 1 week Meka Cisse, [address] [facility] PPO  c/p $40, no auth, $6426 limit(pt &st)    In 2 weeks Meka Cisse, [address] [facility] PPO  c/p $40, no auth, $6910 limit(pt &st)    In 2 weeks Hem[facility]a becker, [address] [facility] PPO  c/p $40, no auth, $2150 limit(pt &st)    In 3 weeks Meka Cisse, PT [address] [place] [address] PPO  c/p $40, no auth, $2150 limit(pt &st)    In 3 weeks Meka Cisse, PT [address] [place] [address] PPO  c/p $40, no auth, $2150 limit(pt &st)    In 4 weeks Hem[facility] becker,  [facility] PPO  c/p $40, no auth, $2150 limit(pt &st)          Recent Outpatient Visits              3 days ago Spinal stenosis of lumbar region without neurogenic claudication    [address] Meka Cisse, PT    Office Visit    1 month ago Spinal stenosis of lumbar region without neurogenic claudication    Sonny Cornejo MD    Office Visit    5 months ago Annual physical exam    Lory Pop, Pooja Rubi MD Office Visit    6 months ago Acute COVID-19    Annabella Dorado MD    Office Visit    10 months ago Benign prostatic hyperplasia with weak urinary stream    [facility] for Arcadio Cuello MD    Office Visit

## 2022-09-16 ENCOUNTER — OFFICE VISIT (OUTPATIENT)
Dept: PHYSICAL THERAPY | Facility: HOSPITAL | Age: 72
End: 2022-09-16
Attending: INTERNAL MEDICINE
Payer: MEDICARE

## 2022-09-16 PROCEDURE — 97110 THERAPEUTIC EXERCISES: CPT

## 2022-09-16 PROCEDURE — 97140 MANUAL THERAPY 1/> REGIONS: CPT

## 2022-09-19 ENCOUNTER — OFFICE VISIT (OUTPATIENT)
Dept: PHYSICAL THERAPY | Facility: HOSPITAL | Age: 72
End: 2022-09-19
Attending: INTERNAL MEDICINE
Payer: MEDICARE

## 2022-09-19 PROCEDURE — 97140 MANUAL THERAPY 1/> REGIONS: CPT

## 2022-09-19 PROCEDURE — 97110 THERAPEUTIC EXERCISES: CPT

## 2022-09-21 ENCOUNTER — OFFICE VISIT (OUTPATIENT)
Dept: INTERNAL MEDICINE CLINIC | Facility: CLINIC | Age: 72
End: 2022-09-21

## 2022-09-21 VITALS
SYSTOLIC BLOOD PRESSURE: 124 MMHG | HEIGHT: 71 IN | BODY MASS INDEX: 26.01 KG/M2 | HEART RATE: 48 BPM | WEIGHT: 185.81 LBS | DIASTOLIC BLOOD PRESSURE: 62 MMHG

## 2022-09-21 DIAGNOSIS — M48.061 SPINAL STENOSIS OF LUMBAR REGION WITHOUT NEUROGENIC CLAUDICATION: ICD-10-CM

## 2022-09-21 DIAGNOSIS — I10 ESSENTIAL HYPERTENSION: Primary | ICD-10-CM

## 2022-09-21 DIAGNOSIS — I25.10 ASHD (ARTERIOSCLEROTIC HEART DISEASE): ICD-10-CM

## 2022-09-21 PROCEDURE — 3008F BODY MASS INDEX DOCD: CPT | Performed by: INTERNAL MEDICINE

## 2022-09-21 PROCEDURE — 99214 OFFICE O/P EST MOD 30 MIN: CPT | Performed by: INTERNAL MEDICINE

## 2022-09-21 PROCEDURE — 3078F DIAST BP <80 MM HG: CPT | Performed by: INTERNAL MEDICINE

## 2022-09-21 PROCEDURE — 3074F SYST BP LT 130 MM HG: CPT | Performed by: INTERNAL MEDICINE

## 2022-09-21 NOTE — PATIENT INSTRUCTIONS
Your blood pressure today was good at 124/62. Please get a COVID booster and flu shot soon. Continue current medication. Continue healthy diet and regular physical activity. Please schedule a Medicare physical in 6 months.

## 2022-09-23 ENCOUNTER — OFFICE VISIT (OUTPATIENT)
Dept: PHYSICAL THERAPY | Facility: HOSPITAL | Age: 72
End: 2022-09-23
Attending: INTERNAL MEDICINE

## 2022-09-23 PROCEDURE — 97110 THERAPEUTIC EXERCISES: CPT

## 2022-09-23 PROCEDURE — 97140 MANUAL THERAPY 1/> REGIONS: CPT

## 2022-09-26 ENCOUNTER — OFFICE VISIT (OUTPATIENT)
Dept: PHYSICAL THERAPY | Facility: HOSPITAL | Age: 72
End: 2022-09-26
Attending: INTERNAL MEDICINE

## 2022-09-26 PROCEDURE — 97110 THERAPEUTIC EXERCISES: CPT

## 2022-09-26 PROCEDURE — 97140 MANUAL THERAPY 1/> REGIONS: CPT

## 2022-09-28 ENCOUNTER — OFFICE VISIT (OUTPATIENT)
Dept: PHYSICAL THERAPY | Facility: HOSPITAL | Age: 72
End: 2022-09-28
Attending: INTERNAL MEDICINE

## 2022-09-28 PROCEDURE — 97140 MANUAL THERAPY 1/> REGIONS: CPT

## 2022-09-28 PROCEDURE — 97110 THERAPEUTIC EXERCISES: CPT

## 2022-09-28 RX ORDER — GABAPENTIN 100 MG/1
200 CAPSULE ORAL 2 TIMES DAILY
Qty: 120 CAPSULE | Refills: 5 | Status: SHIPPED | OUTPATIENT
Start: 2022-09-28

## 2022-10-03 ENCOUNTER — OFFICE VISIT (OUTPATIENT)
Dept: PHYSICAL THERAPY | Facility: HOSPITAL | Age: 72
End: 2022-10-03
Attending: INTERNAL MEDICINE
Payer: MEDICARE

## 2022-10-03 PROCEDURE — 97110 THERAPEUTIC EXERCISES: CPT

## 2022-10-03 PROCEDURE — 97140 MANUAL THERAPY 1/> REGIONS: CPT

## 2022-10-05 ENCOUNTER — OFFICE VISIT (OUTPATIENT)
Dept: PHYSICAL THERAPY | Facility: HOSPITAL | Age: 72
End: 2022-10-05
Attending: INTERNAL MEDICINE
Payer: MEDICARE

## 2022-10-05 PROCEDURE — 97140 MANUAL THERAPY 1/> REGIONS: CPT

## 2022-10-05 PROCEDURE — 97110 THERAPEUTIC EXERCISES: CPT

## 2022-10-08 RX ORDER — ATORVASTATIN CALCIUM 80 MG/1
80 TABLET, FILM COATED ORAL EVERY EVENING
Qty: 90 TABLET | Refills: 3 | Status: SHIPPED | OUTPATIENT
Start: 2022-10-08

## 2022-10-10 ENCOUNTER — OFFICE VISIT (OUTPATIENT)
Dept: PHYSICAL THERAPY | Facility: HOSPITAL | Age: 72
End: 2022-10-10
Attending: INTERNAL MEDICINE
Payer: MEDICARE

## 2022-10-10 PROCEDURE — 97110 THERAPEUTIC EXERCISES: CPT

## 2022-10-11 RX ORDER — TEMAZEPAM 30 MG/1
CAPSULE ORAL
Qty: 30 CAPSULE | Refills: 0 | Status: SHIPPED | OUTPATIENT
Start: 2022-10-11

## 2022-10-24 ENCOUNTER — PATIENT MESSAGE (OUTPATIENT)
Facility: CLINIC | Age: 72
End: 2022-10-24

## 2022-10-25 NOTE — TELEPHONE ENCOUNTER
From: Cassie Hogan  To: Carmelita Us MD  Sent: 10/24/2022 12:36 PM CDT  Subject: Med refill    I have been trying to get a med refill forTamsulosin for a few weeks now. It won't let me refill it on My chart. Could you please call it in asap as I am now out of them.    Thank you, Layton Cedillo

## 2022-10-26 RX ORDER — TAMSULOSIN HYDROCHLORIDE 0.4 MG/1
0.4 CAPSULE ORAL DAILY
Qty: 90 CAPSULE | Refills: 1 | Status: SHIPPED | OUTPATIENT
Start: 2022-10-26

## 2022-10-28 RX ORDER — HYDROCHLOROTHIAZIDE 25 MG/1
25 TABLET ORAL DAILY
Qty: 90 TABLET | Refills: 1 | Status: SHIPPED | OUTPATIENT
Start: 2022-10-28

## 2022-10-28 NOTE — TELEPHONE ENCOUNTER
Protocol failed or has No Protocol, please review  Requested Prescriptions   Pending Prescriptions Disp Refills    HYDROCHLOROTHIAZIDE 25 MG Oral Tab [Pharmacy Med Name: Hydrochlorothiazide 25 Mg Tab Teva] 90 tablet 0     Sig: Take 1 tablet (25 mg total) by mouth daily. Hypertensive Medications Protocol Failed - 10/27/2022  1:33 AM        Failed - CMP or BMP in past 6 months     No results found for this or any previous visit (from the past 4392 hour(s)).             Passed - In person appointment in the past 12 or next 3 months     Recent Outpatient Visits              2 weeks ago     [address], Meka, PT    Office Visit    3 weeks ago     [address], Meka, PT    Office Visit    3 weeks ago     [address], Meka, PT    Office Visit    1 month ago     [address], Meka, PT    Office Visit    1 month ago     [address], Meka, PT    Office Visit                      Passed - Last BP reading less than 140/90     BP Readings from Last 1 Encounters:  09/21/22 : 124/62              Passed - In person appointment or virtual visit in the past 6 months     Recent Outpatient Visits              2 weeks ago     [address], Meka, PT    Office Visit    3 weeks ago     [address], Meka, PT    Office Visit    3 weeks ago     [address], Meka, PT    Office Visit    1 month ago     [address], Meka, PT    Office Visit    1 month ago     [address], Meka, PT    Office Visit                      Passed - [facility] or GFRNAA > 50     GFR Evaluation  GFRNAA: 53 , resulted on 3/28/2022               Recent Outpatient Visits              2 weeks ago     [facility] Meka Cisse PT    Office Visit    3 weeks ago     [address] [facility], PT    Office Visit    3 weeks ago     Meka Rees [place]    Office Visit    1 month ago     Meka Rees [place]    Office Visit    1 month ago     Meka Rees [place]    Office Visit

## 2022-10-28 NOTE — TELEPHONE ENCOUNTER
Protocol failed or has No Protocol, please review  Requested Prescriptions   Pending Prescriptions Disp Refills    HYDROCHLOROTHIAZIDE 25 MG Oral Tab [Pharmacy Med Name: Hydrochlorothiazide 25 Mg Tab Teva] 90 tablet 0     Sig: Take 1 tablet (25 mg total) by mouth daily. Hypertensive Medications Protocol Failed - 10/27/2022  1:33 AM        Failed - CMP or BMP in past 6 months     No results found for this or any previous visit (from the past 4392 hour(s)).             Passed - In person appointment in the past 12 or next 3 months     Recent Outpatient Visits              2 weeks ago     [address], [place], PT    Office Visit    3 weeks ago     [address], [place], PT    Office Visit    3 weeks ago     [address], [place], PT    Office Visit    1 month ago     [address], [place], PT    Office Visit    1 month ago     [address], [place], PT    Office Visit                      Passed - Last BP reading less than 140/90     BP Readings from Last 1 Encounters:  09/21/22 : 124/62              Passed - In person appointment or virtual visit in the past 6 months     Recent Outpatient Visits              2 weeks ago     [address], [place], PT    Office Visit    3 weeks ago     [address], [place], PT    Office Visit    3 weeks ago     [address], [place]eka, PT    Office Visit    1 month ago     [address], [place], PT    Office Visit    1 month ago     [address], [place], PT    Office Visit                      Passed - [facility] or GFRNAA > 50     GFR Evaluation  GFRNAA: 53 , resulted on 3/28/2022               Recent Outpatient Visits              2 weeks ago     [facility] Meka Cisse PT    Office Visit    3 weeks ago     [facility], PT    Office Visit    3 weeks ago     [place] Rees [place]    Office Visit    1 month ago     [place]    Office Visit    1 month ago     [place] Rees [place]    Office Visit

## 2022-11-09 RX ORDER — TEMAZEPAM 30 MG/1
30 CAPSULE ORAL NIGHTLY PRN
Qty: 30 CAPSULE | Refills: 2 | Status: SHIPPED | OUTPATIENT
Start: 2022-11-09

## 2022-11-09 NOTE — TELEPHONE ENCOUNTER
Please review.  Protocol Failed or has no Protocol    Requested Prescriptions   Pending Prescriptions Disp Refills    TEMAZEPAM 30 MG Oral Cap [Pharmacy Med Name: Temazepam 30 Mg Cap Spec] 30 capsule 0     Sig: Take 1 capsule (30 mg total) by mouth nightly AT BEDTIME as needed for Sleep       There is no refill protocol information for this order

## 2022-11-27 RX ORDER — CLOPIDOGREL BISULFATE 75 MG/1
75 TABLET ORAL DAILY
Qty: 90 TABLET | Refills: 3 | Status: SHIPPED | OUTPATIENT
Start: 2022-11-27

## 2023-01-18 RX ORDER — METOPROLOL SUCCINATE 25 MG/1
25 TABLET, EXTENDED RELEASE ORAL DAILY
Qty: 90 TABLET | Refills: 1 | Status: SHIPPED | OUTPATIENT
Start: 2023-01-18

## 2023-01-29 RX ORDER — TEMAZEPAM 30 MG/1
CAPSULE ORAL
Qty: 30 CAPSULE | Refills: 0 | Status: SHIPPED | OUTPATIENT
Start: 2023-01-29

## 2023-01-29 NOTE — TELEPHONE ENCOUNTER
Please review; protocol failed.     Requested Prescriptions   Pending Prescriptions Disp Refills    TEMAZEPAM 30 MG Oral Cap [Pharmacy Med Name: Temazepam 30 Mg Cap Spec] 30 capsule 0     Sig: TAKE ONE CAPSULE BY MOUTH  AT BEDTIME AS NEEDED for sleep       There is no refill protocol information for this order          Future Appointments         Provider Department Appt Notes    In 1 month Lion Sims MD [address], [address] [place], [address] Medicare physical            Recent Outpatient Visits              3 months ago     [address], [place], PT    Office Visit    3 months ago     [address], [place], PT    Office Visit    3 months ago     [address], [place], PT    Office Visit    4 months ago     [facility], PT    Office Visit    4 months ago     [address] [place], [place]    Office Visit

## 2023-02-12 NOTE — TELEPHONE ENCOUNTER
Fax sent to Dr. Ifrah Cruz to advise on cardiac orders (pt on Plavix) prior to procedure with Dr. Iggy Syed on 8/3/21. Fax #: [phone]. English

## 2023-02-25 RX ORDER — TEMAZEPAM 30 MG/1
CAPSULE ORAL
Qty: 30 CAPSULE | Refills: 0 | Status: SHIPPED | OUTPATIENT
Start: 2023-02-25

## 2023-03-20 ENCOUNTER — OFFICE VISIT (OUTPATIENT)
Dept: INTERNAL MEDICINE CLINIC | Facility: CLINIC | Age: 73
End: 2023-03-20

## 2023-03-20 VITALS
HEIGHT: 71 IN | DIASTOLIC BLOOD PRESSURE: 70 MMHG | SYSTOLIC BLOOD PRESSURE: 134 MMHG | HEART RATE: 44 BPM | WEIGHT: 189 LBS | BODY MASS INDEX: 26.46 KG/M2

## 2023-03-20 DIAGNOSIS — I25.5 ISCHEMIC CARDIOMYOPATHY: ICD-10-CM

## 2023-03-20 DIAGNOSIS — Z00.00 ANNUAL PHYSICAL EXAM: Primary | ICD-10-CM

## 2023-03-20 DIAGNOSIS — E78.00 HYPERCHOLESTEROLEMIA: ICD-10-CM

## 2023-03-20 DIAGNOSIS — I70.0 ATHEROSCLEROSIS OF AORTA (HCC): ICD-10-CM

## 2023-03-20 DIAGNOSIS — Z00.00 ENCOUNTER FOR ANNUAL HEALTH EXAMINATION: ICD-10-CM

## 2023-03-20 DIAGNOSIS — I10 ESSENTIAL HYPERTENSION: ICD-10-CM

## 2023-03-20 DIAGNOSIS — N40.0 BENIGN PROSTATIC HYPERPLASIA WITHOUT LOWER URINARY TRACT SYMPTOMS: ICD-10-CM

## 2023-03-20 DIAGNOSIS — I25.10 ASHD (ARTERIOSCLEROTIC HEART DISEASE): ICD-10-CM

## 2023-03-20 DIAGNOSIS — Z86.010 HISTORY OF COLON POLYPS: ICD-10-CM

## 2023-03-20 DIAGNOSIS — M48.062 LUMBAR STENOSIS WITH NEUROGENIC CLAUDICATION: ICD-10-CM

## 2023-03-20 DIAGNOSIS — J44.9 CHRONIC OBSTRUCTIVE PULMONARY DISEASE, UNSPECIFIED COPD TYPE (HCC): ICD-10-CM

## 2023-03-22 ENCOUNTER — LAB ENCOUNTER (OUTPATIENT)
Dept: LAB | Facility: HOSPITAL | Age: 73
End: 2023-03-22
Attending: INTERNAL MEDICINE
Payer: MEDICARE

## 2023-03-22 DIAGNOSIS — Z00.00 ANNUAL PHYSICAL EXAM: ICD-10-CM

## 2023-03-22 LAB
ALBUMIN SERPL-MCNC: 3.3 G/DL (ref 3.4–5)
ALBUMIN/GLOB SERPL: 1.1 {RATIO} (ref 1–2)
ALP LIVER SERPL-CCNC: 93 U/L
ALT SERPL-CCNC: 10 U/L
ANION GAP SERPL CALC-SCNC: 7 MMOL/L (ref 0–18)
AST SERPL-CCNC: 20 U/L (ref 15–37)
BILIRUB SERPL-MCNC: 1 MG/DL (ref 0.1–2)
BUN BLD-MCNC: 18 MG/DL (ref 7–18)
BUN/CREAT SERPL: 15.7 (ref 10–20)
CALCIUM BLD-MCNC: 9.5 MG/DL (ref 8.5–10.1)
CHLORIDE SERPL-SCNC: 102 MMOL/L (ref 98–112)
CHOLEST SERPL-MCNC: 94 MG/DL (ref ?–200)
CO2 SERPL-SCNC: 31 MMOL/L (ref 21–32)
COMPLEXED PSA SERPL-MCNC: 2.48 NG/ML (ref ?–4)
CREAT BLD-MCNC: 1.15 MG/DL
DEPRECATED RDW RBC AUTO: 45.8 FL (ref 35.1–46.3)
ERYTHROCYTE [DISTWIDTH] IN BLOOD BY AUTOMATED COUNT: 13.1 % (ref 11–15)
FASTING PATIENT LIPID ANSWER: YES
FASTING STATUS PATIENT QL REPORTED: YES
GFR SERPLBLD BASED ON 1.73 SQ M-ARVRAT: 66 ML/MIN/1.73M2 (ref 60–?)
GLOBULIN PLAS-MCNC: 3.1 G/DL (ref 2.8–4.4)
GLUCOSE BLD-MCNC: 95 MG/DL (ref 70–99)
HCT VFR BLD AUTO: 42.8 %
HDLC SERPL-MCNC: 40 MG/DL (ref 40–59)
HGB BLD-MCNC: 14.4 G/DL
LDLC SERPL CALC-MCNC: 37 MG/DL (ref ?–100)
MCH RBC QN AUTO: 32.1 PG (ref 26–34)
MCHC RBC AUTO-ENTMCNC: 33.6 G/DL (ref 31–37)
MCV RBC AUTO: 95.5 FL
NONHDLC SERPL-MCNC: 54 MG/DL (ref ?–130)
OSMOLALITY SERPL CALC.SUM OF ELEC: 292 MOSM/KG (ref 275–295)
PLATELET # BLD AUTO: 198 10(3)UL (ref 150–450)
POTASSIUM SERPL-SCNC: 3.7 MMOL/L (ref 3.5–5.1)
PROT SERPL-MCNC: 6.4 G/DL (ref 6.4–8.2)
RBC # BLD AUTO: 4.48 X10(6)UL
SODIUM SERPL-SCNC: 140 MMOL/L (ref 136–145)
TRIGL SERPL-MCNC: 83 MG/DL (ref 30–149)
VLDLC SERPL CALC-MCNC: 11 MG/DL (ref 0–30)
WBC # BLD AUTO: 5.4 X10(3) UL (ref 4–11)

## 2023-03-22 PROCEDURE — 80061 LIPID PANEL: CPT

## 2023-03-22 PROCEDURE — 36415 COLL VENOUS BLD VENIPUNCTURE: CPT

## 2023-03-22 PROCEDURE — 80053 COMPREHEN METABOLIC PANEL: CPT

## 2023-03-22 PROCEDURE — 85027 COMPLETE CBC AUTOMATED: CPT

## 2023-03-29 RX ORDER — TEMAZEPAM 30 MG/1
CAPSULE ORAL
Qty: 30 CAPSULE | Refills: 0 | Status: SHIPPED | OUTPATIENT
Start: 2023-03-29

## 2023-03-29 RX ORDER — GABAPENTIN 100 MG/1
200 CAPSULE ORAL 2 TIMES DAILY
Qty: 120 CAPSULE | Refills: 5 | Status: SHIPPED | OUTPATIENT
Start: 2023-03-29

## 2023-04-17 RX ORDER — TAMSULOSIN HYDROCHLORIDE 0.4 MG/1
0.4 CAPSULE ORAL DAILY
Qty: 90 CAPSULE | Refills: 3 | Status: SHIPPED | OUTPATIENT
Start: 2023-04-17

## 2023-04-24 RX ORDER — TEMAZEPAM 30 MG/1
CAPSULE ORAL
Qty: 30 CAPSULE | Refills: 0 | Status: SHIPPED | OUTPATIENT
Start: 2023-04-24

## 2023-05-22 RX ORDER — TEMAZEPAM 30 MG/1
CAPSULE ORAL
Qty: 30 CAPSULE | Refills: 0 | Status: SHIPPED | OUTPATIENT
Start: 2023-05-22

## 2023-06-20 RX ORDER — TEMAZEPAM 30 MG/1
30 CAPSULE ORAL NIGHTLY PRN
Qty: 30 CAPSULE | Refills: 0 | Status: SHIPPED | OUTPATIENT
Start: 2023-06-20

## 2023-06-20 NOTE — TELEPHONE ENCOUNTER
Please review. Protocol failed or has no protocol. Requested Prescriptions   Pending Prescriptions Disp Refills    TEMAZEPAM 30 MG Oral Cap [Pharmacy Med Name: Temazepam 30 Mg Cap Spec] 30 capsule 0     Sig: TAKE 1 CAPSULE BY MOUTH NIGHTLY AS NEEDED FOR SLEEP.        There is no refill protocol information for this order          Recent Outpatient Visits              3 months ago Annual physical exam    Isra Quinones MD    [facility]    8 months [facility]    Office Visit    8 months ago     [facility] Ru[facility], [place], PT    Office Visit    8 months ago     [facility] Ru[facility], [place], PT    Office Visit    8 months ago     [facility], PT    Office Visit            Future Appointments         Provider Department Appt Notes    In 3 months Karen Luque MD [address] [facility],[address], [address] [facility] 6 months follow up

## 2023-07-10 RX ORDER — METOPROLOL SUCCINATE 25 MG/1
25 TABLET, EXTENDED RELEASE ORAL DAILY
Qty: 90 TABLET | Refills: 3 | Status: SHIPPED | OUTPATIENT
Start: 2023-07-10

## 2023-07-10 NOTE — TELEPHONE ENCOUNTER
Refill passed per [facility], [facility] protocol.     Requested Prescriptions   Pending Prescriptions Disp Refills    METOPROLOL SUCCINATE ER 25 MG Oral Tablet 24 Hr [Pharmacy Med Name: Metoprolol Succinate Er 24hr 25 Mg Tab Nort] 90 tablet 0     Sig: TAKE ONE TABLET BY MOUTH ONE TIME DAILY       Hypertensive Medications Protocol Passed - 7/10/2023  1:33 AM        Passed - In person appointment in the past 12 or next 3 months     Recent Outpatient Visits              3 months ago Annual physical exam    Maci Nevarez MD    Office Visit    9 months ago     [facility] [place] Cisse, PT    Office Visit    9 months ago     [address], PT    Office Visit    9 months ago     [address], [place], PT    Office Visit    9 months ago     [address]    Office Visit          Future Appointments         Provider Department Appt Notes    In 2 months Liliana Kemp MD [address], [address] [facility] 6 months follow up               Passed - Last BP reading less than 140/90     BP Readings from Last 1 Encounters:  03/20/23 : 134/70              Passed - CMP or BMP in past 6 months     Recent Results (from the past 4392 hour(s))   COMP METABOLIC PANEL (14)    Collection Time: 03/22/23  8:38 AM   Result Value Ref Range    Glucose 95 70 - 99 mg/dL    Sodium 140 136 - 145 mmol/L    Potassium 3.7 3.5 - 5.1 mmol/L    Chloride 102 98 - 112 mmol/L    CO2 31.0 21.0 - 32.0 mmol/L    Anion Gap 7 0 - 18 mmol/L    BUN 18 7 - 18 mg/dL    Creatinine 1.15 0.70 - 1.30 mg/dL    BUN/CREA Ratio 15.7 10.0 - 20.0    Calcium, Total 9.5 8.5 - 10.1 mg/dL    Calculated Osmolality 292 275 - 295 mOsm/kg    eGFR-Cr 66 >=60 mL/min/1.73m2    ALT 10 (L) 16 - 61 U/L    AST 20 15 - 37 U/L    Alkaline Phosphatase 93 45 - 117 U/L    Bilirubin, Total 1.0 0.1 - 2.0 mg/dL    Total Protein 6.4 6.4 - 8.2 g/dL    Albumin 3.3 (L) 3.4 - 5.0 g/dL    Globulin  3.1 2.8 - 4.4 g/dL    A/G Ratio 1.1 1.0 - 2.0    Patient Fasting for CMP? Yes      *Note: Due to a large number of results and/or encounters for the requested time period, some results have not been displayed. A complete set of results can be found in Results Review.                Passed - In person appointment or virtual visit in the past 6 months     Recent Outpatient Visits              3 months ago Annual physical exam    Talita Gutiérrez MD    Office Visit    9 months ago     [address] [place], [place], PT    Office Visit    9 months ago     [address] [place], PT    Office Visit    9 months ago     [address], [place], PT    Office Visit    9 months ago     [address], [place], PT    Office Visit          Future Appointments         Provider Department Appt Notes    In 2 months MD Marcie Elliott [address] [place] [address] 6 months follow up               Passed - [facility] or GFRNAA > 50     GFR Evaluation  EGFRCR: 66 , resulted on 3/22/2023             Future Appointments         Provider Department Appt Notes    In 2 months MD Marcie Elliott [facility] 6 months follow up          Recent Outpatient Visits              3 months ago Annual physical exam    Talita Gutiérrez MD    Office Visit    9 months ago     [address] [place], Meka, PT    Office Visit    9 months ago     [address], [place], PT    Office Visit    9 months ago     [address], [place], PT    Office Visit    9 months ago     [facility] [place] Cisse, PT    Office Visit

## 2023-07-14 RX ORDER — TEMAZEPAM 30 MG/1
30 CAPSULE ORAL NIGHTLY PRN
Qty: 30 CAPSULE | Refills: 0 | Status: SHIPPED | OUTPATIENT
Start: 2023-07-14

## 2023-07-14 NOTE — TELEPHONE ENCOUNTER
Please review. Protocol failed / No Protocol. Requested Prescriptions   Pending Prescriptions Disp Refills    TEMAZEPAM 30 MG Oral Cap [Pharmacy Med Name: Temazepam 30 Mg Cap Spec] 30 capsule 0     Sig: Take 1 capsule by mouth nightly as needed for Sleep.        There is no refill protocol information for this order

## 2023-08-15 RX ORDER — TEMAZEPAM 30 MG/1
30 CAPSULE ORAL NIGHTLY PRN
Qty: 30 CAPSULE | Refills: 0 | Status: SHIPPED | OUTPATIENT
Start: 2023-08-15

## 2023-09-05 RX ORDER — GABAPENTIN 100 MG/1
200 CAPSULE ORAL 2 TIMES DAILY
Qty: 120 CAPSULE | Refills: 5 | Status: SHIPPED | OUTPATIENT
Start: 2023-09-05

## 2023-09-19 RX ORDER — TEMAZEPAM 30 MG/1
30 CAPSULE ORAL NIGHTLY PRN
Qty: 30 CAPSULE | Refills: 0 | Status: SHIPPED | OUTPATIENT
Start: 2023-09-19

## 2023-09-20 ENCOUNTER — OFFICE VISIT (OUTPATIENT)
Dept: INTERNAL MEDICINE CLINIC | Facility: CLINIC | Age: 73
End: 2023-09-20

## 2023-09-20 VITALS
HEART RATE: 50 BPM | DIASTOLIC BLOOD PRESSURE: 64 MMHG | SYSTOLIC BLOOD PRESSURE: 130 MMHG | WEIGHT: 182.19 LBS | HEIGHT: 71 IN | BODY MASS INDEX: 25.51 KG/M2

## 2023-09-20 DIAGNOSIS — I25.10 ASHD (ARTERIOSCLEROTIC HEART DISEASE): ICD-10-CM

## 2023-09-20 DIAGNOSIS — I10 ESSENTIAL HYPERTENSION: Primary | ICD-10-CM

## 2023-09-20 PROCEDURE — G0008 ADMIN INFLUENZA VIRUS VAC: HCPCS | Performed by: INTERNAL MEDICINE

## 2023-09-20 PROCEDURE — 3078F DIAST BP <80 MM HG: CPT | Performed by: INTERNAL MEDICINE

## 2023-09-20 PROCEDURE — 90662 IIV NO PRSV INCREASED AG IM: CPT | Performed by: INTERNAL MEDICINE

## 2023-09-20 PROCEDURE — 3075F SYST BP GE 130 - 139MM HG: CPT | Performed by: INTERNAL MEDICINE

## 2023-09-20 PROCEDURE — 1126F AMNT PAIN NOTED NONE PRSNT: CPT | Performed by: INTERNAL MEDICINE

## 2023-09-20 PROCEDURE — 3008F BODY MASS INDEX DOCD: CPT | Performed by: INTERNAL MEDICINE

## 2023-09-20 PROCEDURE — 99214 OFFICE O/P EST MOD 30 MIN: CPT | Performed by: INTERNAL MEDICINE

## 2023-09-20 PROCEDURE — 1159F MED LIST DOCD IN RCRD: CPT | Performed by: INTERNAL MEDICINE

## 2023-09-20 PROCEDURE — 1160F RVW MEDS BY RX/DR IN RCRD: CPT | Performed by: INTERNAL MEDICINE

## 2023-09-20 RX ORDER — LISINOPRIL 10 MG/1
10 TABLET ORAL DAILY
COMMUNITY
Start: 2023-07-17

## 2023-09-20 NOTE — PATIENT INSTRUCTIONS
Your blood pressure today was excellent at 130/64. You received a flu shot today. Please get the new COVID booster soon. Continue current medication, healthy diet and regular physical activity. Please schedule a Medicare physical in March.

## 2023-09-23 RX ORDER — ATORVASTATIN CALCIUM 80 MG/1
80 TABLET, FILM COATED ORAL EVERY EVENING
Qty: 90 TABLET | Refills: 1 | Status: SHIPPED | OUTPATIENT
Start: 2023-09-23

## 2023-10-22 RX ORDER — TEMAZEPAM 30 MG/1
30 CAPSULE ORAL NIGHTLY PRN
Qty: 30 CAPSULE | Refills: 0 | Status: SHIPPED | OUTPATIENT
Start: 2023-10-22

## 2023-11-15 ENCOUNTER — LAB ENCOUNTER (OUTPATIENT)
Dept: LAB | Facility: HOSPITAL | Age: 73
End: 2023-11-15
Attending: INTERNAL MEDICINE
Payer: MEDICARE

## 2023-11-15 DIAGNOSIS — E78.2 MIXED HYPERLIPIDEMIA: Primary | ICD-10-CM

## 2023-11-15 LAB
CHOLEST SERPL-MCNC: 120 MG/DL (ref ?–200)
FASTING PATIENT LIPID ANSWER: YES
HDLC SERPL-MCNC: 37 MG/DL (ref 40–59)
LDLC SERPL CALC-MCNC: 59 MG/DL (ref ?–100)
NONHDLC SERPL-MCNC: 83 MG/DL (ref ?–130)
TRIGL SERPL-MCNC: 136 MG/DL (ref 30–149)
VLDLC SERPL CALC-MCNC: 20 MG/DL (ref 0–30)

## 2023-11-15 PROCEDURE — 80061 LIPID PANEL: CPT

## 2023-11-15 PROCEDURE — 36415 COLL VENOUS BLD VENIPUNCTURE: CPT

## 2023-11-25 RX ORDER — TEMAZEPAM 30 MG/1
30 CAPSULE ORAL NIGHTLY PRN
Qty: 30 CAPSULE | Refills: 0 | Status: SHIPPED | OUTPATIENT
Start: 2023-11-25

## 2023-12-23 RX ORDER — TEMAZEPAM 30 MG/1
30 CAPSULE ORAL NIGHTLY PRN
Qty: 30 CAPSULE | Refills: 0 | Status: SHIPPED | OUTPATIENT
Start: 2023-12-23

## 2024-02-17 DIAGNOSIS — F51.04 CHRONIC INSOMNIA: ICD-10-CM

## 2024-02-19 RX ORDER — GABAPENTIN 100 MG/1
200 CAPSULE ORAL 2 TIMES DAILY
Qty: 360 CAPSULE | Refills: 3 | Status: SHIPPED | OUTPATIENT
Start: 2024-02-19

## 2024-02-19 RX ORDER — TEMAZEPAM 30 MG/1
30 CAPSULE ORAL NIGHTLY PRN
Qty: 30 CAPSULE | Refills: 0 | Status: SHIPPED | OUTPATIENT
Start: 2024-02-19

## 2024-02-19 NOTE — TELEPHONE ENCOUNTER
Refill passed per [facility] protocol.  Requested Prescriptions   Pending Prescriptions Disp Refills    TEMAZEPAM 30 MG Oral Cap [Pharmacy Med Name: Temazepam 30 Mg Cap Spec] 30 capsule 0     Sig: Take 1 capsule by mouth nightly as needed for Sleep.       Controlled Substance Medication Failed - 2/17/2024  2:51 PM        Failed - This medication is a controlled substance - forward to provider to refill          GABAPENTIN 100 MG Oral Cap [Pharmacy Med Name: Gabapentin 100 Mg Cap Nort] 120 capsule 0     Sig: Take 2 capsules (200 mg total) by mouth 2 (two) times daily.       Neurology Medications Passed - 2/17/2024  2:51 PM        Passed - In person appointment or virtual visit in the past 6 mos or appointment in next 3 mos     Recent Outpatient Visits              5 months ago Essential hypertension    [facility]    Office Visit    11 months ago Annual physical exam    [facility]    Office Visit    1 year ago     [facility] [place] Cisse PT    Office Visit    1 year ago     [facility] [place] Cisse PT    Office Visit    1 year ago     [facility] [place] Cisse PT    Office Visit          Future Appointments         Provider Department Appt Notes    In 2 weeks Cristian Addison MD [facility] last 3/20/23                  Recent Outpatient Visits              5 months ago Essential hypertension    [facility]    Office Visit    11 months ago Annual physical exam    [facility]    Office Visit    1 year ago     [facility] M[facility]a Cisse PT    Office Visit    1 year ago     [facility]    Office Visit    1 year ago     [facility] [place] Cisse, PT    Office Visit          Future Appointments         Provider Department Appt Notes    In 2 weeks Cristian Addison MD [facility] last 3/20/23

## 2024-02-19 NOTE — TELEPHONE ENCOUNTER
Please review; protocol failed/ has no protocol    Please see message below for upcoming appointment.    Future Appointments   Date Time Provider Department Center   3/6/2024 11:00 AM Cristian Addison MD [facility]       Requested Prescriptions   Pending Prescriptions Disp Refills    temazepam 30 MG Oral Cap [Pharmacy Med Name: Temazepam 30 Mg Cap Spec] 30 capsule 0     Sig: Take 1 capsule (30 mg total) by mouth nightly as needed for Sleep.       Controlled Substance Medication Failed - 2/17/2024  2:51 PM        Failed - This medication is a controlled substance - forward to provider to refill         Signed Prescriptions Disp Refills    gabapentin 100 MG Oral Cap 360 capsule 3     Sig: Take 2 capsules (200 mg total) by mouth 2 (two) times daily.       Neurology Medications Passed - 2/17/2024  2:51 PM        Passed - In person appointment or virtual visit in the past 6 mos or appointment in next 3 mos     Recent Outpatient Visits              5 months ago Essential hypertension    [facility] Zohreh Coleman Michael, MD    Office Visit    11 months ago Annual physical exam    [facility]    Office Visit    1 year ago     [facility] [place]a Cisse PT    Office Visit    1 year ago     [facility] [place]a Cisse PT    Office Visit    1 year ago     [facility] [place]a Cisse PT    Office Visit          Future Appointments         Provider Department Appt Notes    In 2 weeks Cristian Addison MD [facility] last 3/20/23                  Recent Outpatient Visits              5 months ago Essential hypertension    [facility] [place] [facility]    Office Visit    11 months ago Annual physical exam    [facility]  [facility] Cristian Addison MD    Office Visit    1 year ago     [facility] M[place] Cisse, PT    Office Visit    1 year ago     [facility] [place] Cisse, PT    Office Visit    1 year ago     [facility] [place] Cisse, PT    Office Visit          Future Appointments         Provider Department Appt Notes    In 2 weeks Cristian Addison MD [facility] last 3/20/23

## 2024-03-06 ENCOUNTER — OFFICE VISIT (OUTPATIENT)
Dept: INTERNAL MEDICINE CLINIC | Facility: CLINIC | Age: 74
End: 2024-03-06

## 2024-03-06 VITALS
HEIGHT: 71 IN | HEART RATE: 72 BPM | SYSTOLIC BLOOD PRESSURE: 126 MMHG | BODY MASS INDEX: 28.23 KG/M2 | DIASTOLIC BLOOD PRESSURE: 66 MMHG | WEIGHT: 201.63 LBS

## 2024-03-06 DIAGNOSIS — I10 ESSENTIAL HYPERTENSION: ICD-10-CM

## 2024-03-06 DIAGNOSIS — I25.10 ASHD (ARTERIOSCLEROTIC HEART DISEASE): ICD-10-CM

## 2024-03-06 DIAGNOSIS — N40.0 BENIGN PROSTATIC HYPERPLASIA WITHOUT LOWER URINARY TRACT SYMPTOMS: ICD-10-CM

## 2024-03-06 DIAGNOSIS — I70.0 ATHEROSCLEROSIS OF AORTA (HCC): ICD-10-CM

## 2024-03-06 DIAGNOSIS — Z00.00 ENCOUNTER FOR ANNUAL HEALTH EXAMINATION: ICD-10-CM

## 2024-03-06 DIAGNOSIS — E78.00 HYPERCHOLESTEROLEMIA: ICD-10-CM

## 2024-03-06 DIAGNOSIS — Z86.010 HISTORY OF COLON POLYPS: ICD-10-CM

## 2024-03-06 DIAGNOSIS — J44.9 CHRONIC OBSTRUCTIVE PULMONARY DISEASE, UNSPECIFIED COPD TYPE (HCC): ICD-10-CM

## 2024-03-06 DIAGNOSIS — Z00.00 ANNUAL PHYSICAL EXAM: Primary | ICD-10-CM

## 2024-03-06 DIAGNOSIS — I25.5 ISCHEMIC CARDIOMYOPATHY: ICD-10-CM

## 2024-03-06 NOTE — PATIENT INSTRUCTIONS
Your blood pressure today was good at 126/66  Come in soon when you can for blood tests  Continue current medications  Continue healthy diet and try to exercise regularly  Return visit in 6 months  Amaris Ocasio's SCREENING SCHEDULE   Tests on this list are recommended by your physician but may not be covered, or covered at this frequency, by your insurer.   Please check with your insurance carrier before scheduling to verify coverage.   PREVENTATIVE SERVICES FREQUENCY &  COVERAGE DETAILS LAST COMPLETION DATE   Diabetes Screening    Fasting Blood Sugar / Glucose    One screening every 12 months if never tested or if previously tested but not diagnosed with pre-diabetes   One screening every 6 months if diagnosed with pre-diabetes Lab Results   Component Value Date    GLU 95 03/22/2023        Cardiovascular Disease Screening    Lipid Panel  Cholesterol  Lipoprotein (HDL)  Triglycerides Covered every 5 years for all Medicare beneficiaries without apparent signs or symptoms of cardiovascular disease Lab Results   Component Value Date    CHOLEST 120 11/15/2023    HDL 37 (L) 11/15/2023    LDL 59 11/15/2023    TRIG 136 11/15/2023         Electrocardiogram (EKG)   Covered if needed at Welcome to Medicare, and non-screening if indicated for medical reasons 08/03/2021      Ultrasound Screening for Abdominal Aortic Aneurysm (AAA) Covered once in a lifetime for one of the following risk factors   • Men who are 65-75 years old and have ever smoked   • Anyone with a family history 02/25/2017     Colorectal Cancer Screening  Covered for ages 50-85; only need ONE of the following:    Colonoscopy   Covered every 10 years    Covered every 2 years if patient is at high risk or previous colonoscopy was abnormal 08/03/2021    Health Maintenance   Topic Date Due   • Colorectal Cancer Screening  08/03/2026       Flexible Sigmoidoscopy   Covered every 4 years -    Fecal Occult Blood Test Covered annually -   Prostate Cancer  Screening    Prostate-Specific Antigen (PSA) Annually No results found for: \"PSA\"  There are no preventive care reminders to display for this patient.   Immunizations    Influenza Covered once per flu season  Please get every year 09/20/2023  No recommendations at this time    Pneumococcal Each vaccine ([id number] & [id number]) covered once after 65 Prevnar 13: 10/12/2018    Qknchywox70: 10/25/2014     No recommendations at this time    Hepatitis B One screening covered for patients with certain risk factors   -  No recommendations at this time    Tetanus Toxoid Not covered by Medicare Part B unless medically necessary (cut with metal); may be covered with your pharmacy prescription benefits -    Tetanus, Diptheria and Pertusis TD and TDaP Not covered by Medicare Part B -  No recommendations at this time    Zoster Not covered by Medicare Part B; may be covered with your pharmacy  prescription benefits 10/25/2014  Zoster Vaccines(2 of 3) due on 12/20/2014     Annual Monitoring of Persistent Medications (ACE/ARB, digoxin diuretics, anticonvulsants)    Potassium Annually Lab Results   Component Value Date    K 3.7 03/22/2023         Creatinine   Annually Lab Results   Component Value Date    CREATSERUM 1.15 03/22/2023         BUN Annually Lab Results   Component Value Date    BUN 18 03/22/2023       Drug Serum Conc Annually No results found for: \"DIGOXIN\", \"DIG\", \"VALP\"         Chronic Obstructive Pulmonary Disease (COPD)    Spirometry Annually Spirometry date:

## 2024-03-06 NOTE — PROGRESS NOTES
Subjective:   Amaris Ocasio is a 74 year old male who presents this morning for an MA (Medicare Advantage) Supervisit (Once per calendar year) and scheduled follow up of multiple significant but stable problems including ASHD, ischemic cardiomyopathy, hypertension and hypercholesterolemia.    His last Medicare physical was March 2023.  He feels well.  No specific issues for discussion.  He follows regularly with Cardiology with appointment scheduled in 1 week.    In terms of his hypertension, BP checks typically 110-120/60-70.  He tries to watch his diet and is active with occasional walking.  Weight up 12 pounds since physical March 2023.    History/Other:   Fall Risk Assessment:   He has been screened for Falls and is low risk.      Cognitive Assessment:   He had a completely normal cognitive assessment - see flowsheet entries     Functional Ability/Status:   Amaris Ocasio has a completely normal functional assessment. See flowsheet for details.      Depression Screening (PHQ-2/PHQ-9): PHQ-2 SCORE: 0  , done 3/6/2024     Advanced Directives:   He does NOT have a Living Will. [Do you have a living will?: No]  He does NOT have a Power of  for Health Care. 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    Current Medications:  Outpatient Medications Marked as Taking for the 3/6/24 encounter (Office Visit) with Cristian Addison MD   Medication Sig    temazepam 30 MG Oral Cap Take 1 capsule (30 mg  total) by mouth nightly as needed for Sleep.    gabapentin 100 MG Oral Cap Take 2 capsules (200 mg total) by mouth 2 (two) times daily.    atorvastatin 80 MG Oral Tab Take 1 tablet (80 mg total) by mouth every evening.    lisinopril 10 MG Oral Tab Take 1 tablet (10 mg total) by mouth daily.    hydroCHLOROthiazide 25 MG Oral Tab Take 1 tablet (25 mg total) by mouth every morning.    tamsulosin 0.4 MG Oral Cap Take 1 capsule (0.4 mg total) by mouth daily. TAKE 1/2 HOUR FOLLOWING THE SAME MEAL EACH DAY    clopidogrel 75 MG Oral Tab Take 1 tablet (75 mg total) by mouth daily.    aspirin 81 MG Oral Tab Take 1 tablet (81 mg total) by mouth daily.    acetaminophen 500 MG Oral Tab Take 1 tablet (500 mg total) by mouth every 6 (six) hours as needed for Pain.    Cholecalciferol (VITAMIN D) 1000 UNITS Oral Cap Take 1 capsule by mouth daily.         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  Surgical History:  He  has a past surgical history that includes foot/toes surgery proc unlisted (Right, 1974); coronary stent placement (2013); Inguinal hernia repair (Right, 06/21/2017); angioplasty (coronary) (12/29/2013); and colonoscopy (N/A, 08/03/2021).   Family History:  His family history includes Brain Cancer in his mother; COPD in his father; Cancer in his mother.  Social History:  He  reports that he quit smoking about 10 years ago. His smoking use included cigarettes. He has a 10 pack-year smoking history. He has never used smokeless tobacco. He reports current alcohol use. He reports that he does not use drugs.    Tobacco:  He smoked  tobacco in the past but quit greater than 12 months ago.  Social History    Tobacco Use      Smoking status: Former        Packs/day: 1.00        Years: 10.00        Additional pack years: 0.00        Total pack years: 10.00        Types: Cigarettes        Quit date: 12/29/2013        Years since quitting: 10.1      Smokeless tobacco: Never       CAGE Alcohol Screen:   CAGE screening score of 0 on 3/6/2024, showing low risk of alcohol abuse.      Patient Care Team:  Cristian Addison MD as PCP - General (Internal Medicine)  Andrade Gant DO (Physical Medicine)  Ean Osorio MD (CARDIOLOGY)  Tasia Virk Amy, PT as Physical Therapist (Physical Therapy)    Review of Systems    REVIEW OF SYSTEMS:   GENERAL: No fever  LUNGS: No cough wheezing or shortness of breath  CARDIAC: No lightheadedness palpitations or chest pain  GI: No anorexia heartburn dysphagia nausea vomiting abdominal pain diarrhea constipation or rectal bleeding  : Occasionally weak urinary stream.  No urinary frequency dysuria or hematuria  MUSCULOSKELETAL: No leg swelling  NEURO: No headaches     Objective:   Physical Exam    EXAM:   GENERAL: Pleasant male appearing well in no distress  /66   Pulse 72   Ht 5' 11\" (1.803 m)   Wt 201 lb 9.6 oz (91.4 kg)   BMI 28.12 kg/m²   HEENT: Anicteric, conjunctiva pink, oropharynx normal except poor dentition  NECK: Supple without mass or thyromegaly  NODES: No peripheral adenopathy  LUNGS: Resonant to percussion and clear to auscultation without crackles or wheezes  CARDIAC: Rhythm regular S1 S2 normal without murmur or edema  ABDOMEN: Bowel sounds normal soft nontender without mass or hepatosplenomegaly  EXTREMITIES: Normal without cyanosis or clubbing  PULSES: Carotid upstrokes 2+ without carotid bruits, distal pulses 1-2+ bilateral dorsalis pedis and posterior tibial  NEURO: Reflexes 1-2+ bilaterally and symmetric     /66   Pulse 72   Ht 5' 11\" (1.803 m)   Wt 201 lb 9.6 oz  (91.4 kg)   BMI 28.12 kg/m²  Estimated body mass index is 28.12 kg/m² as calculated from the following:    Height as of this encounter: 5' 11\" (1.803 m).    Weight as of this encounter: 201 lb 9.6 oz (91.4 kg).    Medicare Hearing Assessment:   Hearing Screening    Time taken: 3/6/2024 10:37 AM  Screening Method: Questionnaire  I have a problem hearing over the telephone: No I have trouble following the conversations when two or more people are talking at the same time: No   I have trouble understanding things on the TV: No I have to strain to understand conversations: No   I have to worry about missing the telephone ring or doorbell: No I have trouble hearing conversations in a noisy background such as a crowded room or restaurant: No   I get confused about where sounds come from: No I misunderstand some words in a sentence and need to ask people to repeat themselves: No   I especially have trouble understanding the speech of women and children: No I have trouble understanding the speaker in a large room such as at a meeting or place of Lutheran: No   Many people I talk to seem to mumble (or don't speak clearly): No People get annoyed because I misunderstand what they say: No   I misunderstand what others are saying and make inappropriate responses: No I avoid social activities because I cannot hear well and fear I will reply improperly: No   Family members and friends have told me they think I may have hearing loss: No                   Assessment & Plan:   Amaris Ocasio is a 74 year old male who presents for a Medicare Assessment.     1. Annual physical exam (Primary)  Check CMP CBC lipid profile and screening PSA when able.  Order sent  Continue current medications  Reinforced healthy diet and encouraged regular exercise with perhaps some weight loss  Annual Medicare physical  Return visit in 6 months for follow-up  -     Comp Metabolic Panel (14); Future; Expected date: 03/06/2024  -     CBC, Platelet;  No Differential; Future; Expected date: 03/06/2024  -     Lipid Panel; Future; Expected date: 03/06/2024  -     PSA Total, Screen; Future; Expected date: 03/06/2024    2. ASHD (arteriosclerotic heart disease)  Asymptomatic  Continue current medications and follow-up soon as planned with Cardiology    3. Ischemic cardiomyopathy  Asymptomatic without symptoms or signs of CHF    4. Chronic obstructive pulmonary disease, unspecified COPD type (HCC)  Asymptomatic.  Ongoing abstinence from tobacco  Overview:  CXR 1-22    5. Essential hypertension  Well-controlled.  Continue current medication    6. Hypercholesterolemia  Continue statin and await labs    7. History of colon polyps  Next colonoscopy due August 2026    8. Benign prostatic hyperplasia without lower urinary tract symptoms  Continue tamsulosin    9. Atherosclerosis of aorta (HCC)  Asymptomatic.  Continue risk factor control including statin and antiplatelets  Overview:  CXR 2-13      The patient indicates understanding of these issues and agrees to the plan.  Reinforced healthy diet, lifestyle, and exercise.      No follow-ups on file.     Cristian Addison MD, 3/6/2024     Supplementary Documentation:   General Health:  In the past six months, have you lost more than 10 pounds without trying?: 2 - No  Has your appetite been poor?: No  Type of Diet: Balanced  How does the patient maintain a good energy level?: Other  How would you describe your daily physical activity?: Light  How would you describe your current health state?: Good  How do you maintain positive mental well-being?: Visiting Friends;Visiting Family  On a scale of 0 to 10, with 0 being no pain and 10 being severe pain, what is your pain level?: 0 - (None)  In the past six months, have you experienced urine leakage?: 0-No  At any time do you feel concerned for the safety/well-being of yourself and/or your children, in your home or elsewhere?: No  Have you had any immunizations at another office such  as Influenza, Hepatitis B, Tetanus, or Pneumococcal?: No        Amaris Ocasio's SCREENING SCHEDULE   Tests on this list are recommended by your physician but may not be covered, or covered at this frequency, by your insurer.   Please check with your insurance carrier before scheduling to verify coverage.   PREVENTATIVE SERVICES FREQUENCY &  COVERAGE DETAILS LAST COMPLETION DATE   Diabetes Screening    Fasting Blood Sugar / Glucose    One screening every 12 months if never tested or if previously tested but not diagnosed with pre-diabetes   One screening every 6 months if diagnosed with pre-diabetes Lab Results   Component Value Date    GLU 95 03/22/2023        Cardiovascular Disease Screening    Lipid Panel  Cholesterol  Lipoprotein (HDL)  Triglycerides Covered every 5 years for all Medicare beneficiaries without apparent signs or symptoms of cardiovascular disease Lab Results   Component Value Date    CHOLEST 120 11/15/2023    HDL 37 (L) 11/15/2023    LDL 59 11/15/2023    TRIG 136 11/15/2023         Electrocardiogram (EKG)   Covered if needed at Welcome to Medicare, and non-screening if indicated for medical reasons 08/03/2021      Ultrasound Screening for Abdominal Aortic Aneurysm (AAA) Covered once in a lifetime for one of the following risk factors    Men who are 65-75 years old and have ever smoked    Anyone with a family history 02/25/2017     Colorectal Cancer Screening  Covered for ages 50-85; only need ONE of the following:    Colonoscopy   Covered every 10 years    Covered every 2 years if patient is at high risk or previous colonoscopy was abnormal 08/03/2021    Health Maintenance   Topic Date Due    Colorectal Cancer Screening  08/03/2026       Flexible Sigmoidoscopy   Covered every 4 years -    Fecal Occult Blood Test Covered annually -   Prostate Cancer Screening    Prostate-Specific Antigen (PSA) Annually No results found for: \"PSA\"  There are no preventive care reminders to display for this  patient.   Immunizations    Influenza Covered once per flu season  Please get every year 09/20/2023  No recommendations at this time    Pneumococcal Each vaccine (Gcyxqow11 & Xugrekxrf65) covered once after 65 Prevnar 13: 10/12/2018    Cqxmpbowu78: 10/25/2014     No recommendations at this time    Hepatitis B One screening covered for patients with certain risk factors   -  No recommendations at this time    Tetanus Toxoid Not covered by Medicare Part B unless medically necessary (cut with metal); may be covered with your pharmacy prescription benefits -    Tetanus, Diptheria and Pertusis TD and TDaP Not covered by Medicare Part B -  No recommendations at this time    Zoster Not covered by Medicare Part B; may be covered with your pharmacy  prescription benefits 10/25/2014  Zoster Vaccines(2 of 3) due on 12/20/2014     Annual Monitoring of Persistent Medications (ACE/ARB, digoxin diuretics, anticonvulsants)    Potassium Annually Lab Results   Component Value Date    K 3.7 03/22/2023         Creatinine   Annually Lab Results   Component Value Date    CREATSERUM 1.15 03/22/2023         BUN Annually Lab Results   Component Value Date    BUN 18 03/22/2023       Drug Serum Conc Annually No results found for: \"DIGOXIN\", \"DIG\", \"VALP\"           Chronic Obstructive Pulmonary Disease (COPD)    Spirometry Annually Spirometry date:

## 2024-03-11 ENCOUNTER — LAB ENCOUNTER (OUTPATIENT)
Dept: LAB | Facility: HOSPITAL | Age: 74
End: 2024-03-11
Attending: INTERNAL MEDICINE
Payer: MEDICARE

## 2024-03-11 DIAGNOSIS — Z00.00 ANNUAL PHYSICAL EXAM: ICD-10-CM

## 2024-03-11 LAB
ALBUMIN SERPL-MCNC: 4.2 G/DL (ref 3.2–4.8)
ALBUMIN/GLOB SERPL: 1.5 {RATIO} (ref 1–2)
ALP LIVER SERPL-CCNC: 110 U/L
ALT SERPL-CCNC: 7 U/L
ANION GAP SERPL CALC-SCNC: <0 MMOL/L (ref 0–18)
AST SERPL-CCNC: 20 U/L (ref ?–34)
BILIRUB SERPL-MCNC: 1.1 MG/DL (ref 0.2–1.1)
BUN BLD-MCNC: 19 MG/DL (ref 9–23)
BUN/CREAT SERPL: 15.8 (ref 10–20)
CALCIUM BLD-MCNC: 9.9 MG/DL (ref 8.7–10.4)
CHLORIDE SERPL-SCNC: 108 MMOL/L (ref 98–112)
CHOLEST SERPL-MCNC: 114 MG/DL (ref ?–200)
CO2 SERPL-SCNC: 32 MMOL/L (ref 21–32)
COMPLEXED PSA SERPL-MCNC: 2.21 NG/ML (ref ?–4)
CREAT BLD-MCNC: 1.2 MG/DL
DEPRECATED RDW RBC AUTO: 44.7 FL (ref 35.1–46.3)
EGFRCR SERPLBLD CKD-EPI 2021: 63 ML/MIN/1.73M2 (ref 60–?)
ERYTHROCYTE [DISTWIDTH] IN BLOOD BY AUTOMATED COUNT: 12.5 % (ref 11–15)
FASTING PATIENT LIPID ANSWER: YES
FASTING STATUS PATIENT QL REPORTED: YES
GLOBULIN PLAS-MCNC: 2.8 G/DL (ref 2.8–4.4)
GLUCOSE BLD-MCNC: 85 MG/DL (ref 70–99)
HCT VFR BLD AUTO: 46.7 %
HDLC SERPL-MCNC: 42 MG/DL (ref 40–59)
HGB BLD-MCNC: 15.1 G/DL
LDLC SERPL CALC-MCNC: 53 MG/DL (ref ?–100)
MCH RBC QN AUTO: 31.1 PG (ref 26–34)
MCHC RBC AUTO-ENTMCNC: 32.3 G/DL (ref 31–37)
MCV RBC AUTO: 96.3 FL
NONHDLC SERPL-MCNC: 72 MG/DL (ref ?–130)
OSMOLALITY SERPL CALC.SUM OF ELEC: 284 MOSM/KG (ref 275–295)
PLATELET # BLD AUTO: 214 10(3)UL (ref 150–450)
POTASSIUM SERPL-SCNC: 3.2 MMOL/L (ref 3.5–5.1)
PROT SERPL-MCNC: 7 G/DL (ref 5.7–8.2)
RBC # BLD AUTO: 4.85 X10(6)UL
SODIUM SERPL-SCNC: 136 MMOL/L (ref 136–145)
TRIGL SERPL-MCNC: 102 MG/DL (ref 30–149)
VLDLC SERPL CALC-MCNC: 15 MG/DL (ref 0–30)
WBC # BLD AUTO: 7.4 X10(3) UL (ref 4–11)

## 2024-03-11 PROCEDURE — 85027 COMPLETE CBC AUTOMATED: CPT

## 2024-03-11 PROCEDURE — 80053 COMPREHEN METABOLIC PANEL: CPT

## 2024-03-11 PROCEDURE — 36415 COLL VENOUS BLD VENIPUNCTURE: CPT

## 2024-03-11 PROCEDURE — 80061 LIPID PANEL: CPT

## 2024-03-11 RX ORDER — POTASSIUM CHLORIDE 1500 MG/1
20 TABLET, EXTENDED RELEASE ORAL DAILY
Qty: 90 TABLET | Refills: 1 | Status: SHIPPED | OUTPATIENT
Start: 2024-03-11 | End: 2024-04-10

## 2024-03-16 DIAGNOSIS — F51.04 CHRONIC INSOMNIA: ICD-10-CM

## 2024-03-19 RX ORDER — TEMAZEPAM 30 MG/1
30 CAPSULE ORAL NIGHTLY PRN
Qty: 30 CAPSULE | Refills: 0 | Status: SHIPPED | OUTPATIENT
Start: 2024-03-19

## 2024-03-19 RX ORDER — ATORVASTATIN CALCIUM 80 MG/1
80 TABLET, FILM COATED ORAL EVERY EVENING
Qty: 90 TABLET | Refills: 3 | Status: SHIPPED | OUTPATIENT
Start: 2024-03-19

## 2024-03-19 NOTE — TELEPHONE ENCOUNTER
Refill passed per [facility] protocol.  Requested Prescriptions   Pending Prescriptions Disp Refills    ATORVASTATIN 80 MG Oral Tab [Pharmacy Med Name: Atorvastatin Calcium 80 Mg Tab Nort] 90 tablet 0     Sig: Take 1 tablet by mouth every evening.       Cholesterol Medication Protocol Passed - 3/17/2024  1:31 AM        Passed - ALT < 80     Lab Results   Component Value Date    ALT 7 (L) 03/11/2024             Passed - ALT resulted within past year        Passed - Lipid panel within past 12 months     Lab Results   Component Value Date    CHOLEST 114 03/11/2024    TRIG 102 03/11/2024    HDL 42 03/11/2024    LDL 53 03/11/2024    VLDL 15 03/11/2024    NONHDLC 72 03/11/2024             Passed - In person appointment or virtual visit in the past 12 mos or appointment in next 3 mos     Recent Outpatient Visits              1 week ago Annual physical exam    [facility] Cristian Quintanilla MD    Office Visit    6 months ago [facility]    [facility]    1 year ago Annual physical exam    [facility] Cristian Addison MD    Office Visit    1 year ago     [facility] Meka Cisse PT    Office Visit    1 year ago     [facility] Meka Cisse PT    Office Visit          Future Appointments         Provider Department Appt Notes    In 5 months Cristian Addison MD [facility] 6M FU                  Recent Outpatient Visits              1 week ago Annual physical exam    [facility]    [facility]    6 months ago [facility]ential hypertension    [facility]    Office Visit    1 year ago Annual physical exam    [facility] Cristian Addison MD    Office Visit    1 year ago     [facility] Meka Cisse, PT    Office Visit    1 year ago     [facility] Meka Cisse, PT    Office Visit          Future Appointments         Provider Department Appt Notes    In 5 months Cristian Addison MD [facility] 6M FU             no

## 2024-03-19 NOTE — TELEPHONE ENCOUNTER
Please review. Protocol failed or has no protocol.    Requested Prescriptions   Pending Prescriptions Disp Refills    TEMAZEPAM 30 MG Oral Cap [Pharmacy Med Name: Temazepam 30 Mg Cap Spec] 30 capsule 0     Sig: Take 1 capsule by mouth nightly as needed for Sleep.       Controlled Substance Medication Failed - 3/16/2024  3:20 PM        Failed - This medication is a controlled substance - forward to provider to refill             Recent Outpatient Visits              1 week ago [facility]    Office Visit    6 months ago Essential hypertension    [facility]    Office Visit    1 year ago Annual physical exam    [facility]    Office Visit    1 year ago     [facility] Meka Cisse PT    Office Visit    1 year ago     [facility] Meka Cisse PT    Office Visit            Future Appointments         Provider Department Appt Notes    In 5 months Cristian Addison MD [facility] 6M FU

## 2024-03-28 RX ORDER — TAMSULOSIN HYDROCHLORIDE 0.4 MG/1
0.4 CAPSULE ORAL DAILY
Qty: 90 CAPSULE | Refills: 3 | Status: SHIPPED | OUTPATIENT
Start: 2024-03-28

## 2024-03-28 NOTE — TELEPHONE ENCOUNTER
Requested Prescriptions     Pending Prescriptions Disp Refills    TAMSULOSIN 0.4 MG Oral Cap [Pharmacy Med Name: Tamsulosin Hydrochloride 0.4 Mg Cap Nort] 90 capsule 0     Sig: Take 1 capsule (0.4 mg total) by mouth daily. TAKE 1/2 HOUR FOLLOWING THE SAME MEAL EACH DAY        Routed to the wrong doctor - Routed to Dr Addison

## 2024-04-08 RX ORDER — HYDROCHLOROTHIAZIDE 25 MG/1
25 TABLET ORAL EVERY MORNING
Qty: 90 TABLET | Refills: 3 | Status: SHIPPED | OUTPATIENT
Start: 2024-04-08

## 2024-04-08 NOTE — TELEPHONE ENCOUNTER
REFILL PASSED PER [facility] PROTOCOLS    Requested Prescriptions   Pending Prescriptions Disp Refills    HYDROCHLOROTHIAZIDE 25 MG Oral Tab [Pharmacy Med Name: Hydrochlorothiazide 25 Mg Tab Teva] 90 tablet 0     Sig: TAKE ONE TABLET BY MOUTH EVERY MORNING       Hypertension Medications Protocol Passed - 4/6/2024  1:32 AM        Passed - CMP or BMP in past 12 months        Passed - Last BP reading less than 140/90     BP Readings from Last 1 Encounters:   03/06/24 126/66               Passed - In person appointment or virtual visit in the past 12 mos or appointment in next 3 mos     Recent Outpatient Visits              1 month ago [facility] physical [facility]    Office Visit    6 months ago Essential hypertension    [facility]    Office Visit    1 year ago Annual physical exam    [facility]    Office Visit    1 year ago     [facility] Meka Cisse, PT    Office Visit    1 year ago     [facility] Meka Cisse, PT    Office Visit          Future Appointments         Provider Department Appt Notes    In 5 months Cristian Addison MD [facility] 6M FU               Passed - EGFRCR or GFRNAA > 50     GFR Evaluation  EGFRCR: 63 , resulted on 3/11/2024               Future Appointments         Provider Department Appt Notes    In 5 months [facility]          Recent Outpatient Visits              1 month ago Annual physical exam    [facility]    Office Visit    6 months ago Essential hypertension    [facility]    [facility] Visit     1 year ago Annual physical exam    [facility] Cristian Addison MD    Office Visit    1 year ago     [facility] Meka Cisse, PT    Office Visit    1 year ago     [facility] Meka Cisse, PT    Office Visit

## 2024-04-13 DIAGNOSIS — F51.04 CHRONIC INSOMNIA: ICD-10-CM

## 2024-04-15 RX ORDER — TEMAZEPAM 30 MG/1
30 CAPSULE ORAL NIGHTLY PRN
Qty: 30 CAPSULE | Refills: 0 | Status: SHIPPED | OUTPATIENT
Start: 2024-04-15

## 2024-04-15 NOTE — TELEPHONE ENCOUNTER
Please review; protocol failed/No Protocol    LOV: 03/06/2024    Fill dates: 01/21/2024, 02/19/2024, 03/19/2024    Requested Prescriptions   Pending Prescriptions Disp Refills    TEMAZEPAM 30 MG Oral Cap [Pharmacy Med Name: Temazepam 30 Mg Cap Spec] 30 capsule 0     Sig: Take 1 capsule by mouth nightly as needed for Sleep.       Controlled Substance Medication Failed - 4/13/2024 11:57 AM        Failed - This medication is a controlled substance - forward to provider to refill           Future Appointments         Provider Department Appt Notes    In 4 months Cristian Addison MD [facility] 6M FU          Recent Outpatient Visits              1 month ago Annual physical exam    [facility]    Office Visit    6 months ago Essential hypertension    [facility] Cristian Addison MD    Office Visit    1 year ago Annual physical exam    [facility]    Office Visit    1 year ago     [facility] Meka Cisse, PT    Office Visit    1 year ago     [facility] Meka Cisse PT    Office Visit

## 2024-05-11 DIAGNOSIS — F51.04 CHRONIC INSOMNIA: ICD-10-CM

## 2024-05-13 RX ORDER — TEMAZEPAM 30 MG/1
30 CAPSULE ORAL NIGHTLY PRN
Qty: 30 CAPSULE | Refills: 0 | Status: SHIPPED | OUTPATIENT
Start: 2024-05-16

## 2024-05-13 NOTE — TELEPHONE ENCOUNTER
Please review. Protocol Failed; No Protocol    Recent fills: 2/19/2024, 3/19/2024, 4/15/2024  DUE: 5/16/2024  Last Rx written: 4/15/2024  Last office visit: 3/6/2024    Future Appointments  Date Type Provider Dept   09/06/24 Appointment Cristian Addison MD [facility]   Showing future appointments within next 150 days with a meds authorizing provider and meeting all other requirements        Requested Prescriptions   Pending Prescriptions Disp Refills    TEMAZEPAM 30 MG Oral Cap [Pharmacy Med Name: Temazepam 30 Mg Cap Spec] 30 capsule 0     Sig: Take 1 capsule by mouth nightly as needed for Sleep.       Controlled Substance Medication Failed - 5/11/2024 10:26 AM        Failed - This medication is a controlled substance - forward to provider to refill               Future Appointments         Provider Department Appt Notes    In 3 months Cristian Addison MD [facility] 6M FU          Recent Outpatient Visits              2 months ago [facility]    [facility] Visit    7 months ago Essential hypertension    [facility]    Office Visit    1 year ago Annual physical exam    [facility] Cristian Addison MD    Office Visit    1 year ago     [facility] Meka Cisse PT    Office Visit    1 year ago     [facility] Meka Cisse, PT    Office Visit

## 2024-06-15 DIAGNOSIS — F51.04 CHRONIC INSOMNIA: ICD-10-CM

## 2024-06-17 RX ORDER — TEMAZEPAM 30 MG/1
30 CAPSULE ORAL NIGHTLY PRN
Qty: 30 CAPSULE | Refills: 0 | Status: SHIPPED | OUTPATIENT
Start: 2024-06-17

## 2024-06-17 NOTE — TELEPHONE ENCOUNTER
Please review.  Protocol failed / Has no protocol.     Temazepam 30mg Recent fills each quantity 30 : 2/19, 3/19, 4/15, 5/17  Due 6/19/24  Last prescription written: 5/13/24  Last office visit: 3/6/24    Future Appointments  Date Type Provider Dept   09/06/24 Appointment Cristian Addison MD [facility]     Requested Prescriptions   Pending Prescriptions Disp Refills    TEMAZEPAM 30 MG Oral Cap [Pharmacy Med Name: Temazepam 30 Mg Cap Spec] 30 capsule 0     Sig: Take 1 capsule  by mouth nightly as needed for Sleep.       Controlled Substance Medication Failed - 6/15/2024  9:51 AM        Failed - This medication is a controlled substance - forward to provider to refill           Future Appointments         Provider Department Appt Notes    In 2 months Cristian Addison MD [facility] 6M FU          Recent Outpatient Visits              3 months ago Annual physical exam    [facility] Cristian Addison MD    Office Visit    9 months ago Essential hypertension    [facility]    Office Visit    1 year ago Annual physical exam    [facility] Cristian Addison MD    Office Visit    1 year ago     [facility] Meka Cisse, MARGARET    Office Visit    1 year ago     [facility] Meka Cisse PT    Office Visit

## 2024-07-13 DIAGNOSIS — F51.04 CHRONIC INSOMNIA: ICD-10-CM

## 2024-07-16 RX ORDER — TEMAZEPAM 30 MG/1
30 CAPSULE ORAL NIGHTLY PRN
Qty: 30 CAPSULE | Refills: 0 | Status: SHIPPED | OUTPATIENT
Start: 2024-07-16

## 2024-07-16 NOTE — TELEPHONE ENCOUNTER
Please review.  Protocol failed / Has no protocol.  Marked High Priority, patient states out of medication    Temazepam Recent fills each quantity 30 : 4/15/24, 5/17/24, 6/17/24  Last prescription written: 6/17/24  Last office visit: 3/6/24    Future Appointments  Date Type Provider Dept   09/06/24 Appointment Cristian Addison MD [facility]      Requested Prescriptions   Pending Prescriptions Disp Refills    TEMAZEPAM 30 MG Oral Cap [Pharmacy Med Name: Temazepam 30 Mg Cap Spec] 30 capsule 0     Sig: Take 1 capsule by mouth nightly as needed for Sleep.       Controlled Substance Medication Failed - 7/16/2024  9:33 AM        Failed - This medication is a controlled substance - forward to provider to refill           Future Appointments         Provider Department Appt Notes    In 1 month Cristian Addison MD [facility] 6M FU          Recent Outpatient Visits              4 months ago Annual physical exam    [facility] Cristian Addison MD    Office Visit    10 months ago [facility]    Office Visit    1 year ago Annual physical exam    [facility] Cristian Addison MD    Office Visit    1 year ago     [facility] Meka Cisse PT    Office Visit    1 year ago     [facility] Meka Cisse PT    Office Visit

## 2024-08-13 DIAGNOSIS — F51.04 CHRONIC INSOMNIA: ICD-10-CM

## 2024-08-14 DIAGNOSIS — F51.04 CHRONIC INSOMNIA: ICD-10-CM

## 2024-08-16 RX ORDER — TEMAZEPAM 30 MG/1
30 CAPSULE ORAL NIGHTLY PRN
Qty: 30 CAPSULE | Refills: 0 | Status: SHIPPED | OUTPATIENT
Start: 2024-08-16

## 2024-08-16 RX ORDER — TEMAZEPAM 30 MG/1
30 CAPSULE ORAL NIGHTLY PRN
Qty: 30 CAPSULE | Refills: 0 | OUTPATIENT
Start: 2024-08-16

## 2024-08-16 NOTE — TELEPHONE ENCOUNTER
Please review. Protocol Failed; No Protocol      Recent fills: 5/17/2024, 6/17/2024, 7/16/2024  Last Rx written: 7/16/2024  Last office visit: 3/6/2024      Future Appointments  Date Type Provider Dept   09/06/24 Appointment Cristian Addison MD [facility]   Showing future appointments within next 150 days with a meds authorizing provider and meeting all other requirements        Requested Prescriptions   Pending Prescriptions Disp Refills    TEMAZEPAM 30 MG Oral Cap [Pharmacy Med Name: Temazepam 30 Mg Cap Spec] 30 capsule 0     Sig: Take 1 capsule by mouth nightly as needed for Sleep.       Controlled Substance Medication Failed - 8/13/2024  9:05 AM        Failed - This medication is a controlled substance - forward to provider to refill               Future Appointments         Provider Department Appt Notes    In 3 weeks Cristian Addison MD [facility] 6M FU          Recent Outpatient Visits              5 months ago Annual physical exam    [facility]    Office Visit    11 months ago Essential hypertension    [facility]    Office Visit    1 year ago Annual physical exam    [facility] Cristian Addison MD    Office Visit    1 year ago     [facility] Meka Cisse PT    Office Visit    1 year ago     [facility] Meka Cisse, PT    Office Visit

## 2024-08-23 RX ORDER — POTASSIUM CHLORIDE 1500 MG/1
1 TABLET, EXTENDED RELEASE ORAL DAILY
Qty: 90 TABLET | Refills: 0 | Status: SHIPPED | OUTPATIENT
Start: 2024-08-23

## 2024-08-23 NOTE — TELEPHONE ENCOUNTER
Please review; protocol failed/ has no protocol    Requested Prescriptions   Pending Prescriptions Disp Refills    POTASSIUM CHLORIDE ER 20 MEQ Oral Tab CR [Pharmacy Med Name: Potassium Chloride Er 20 Meq Tab Adva] 90 tablet 0     Sig: TAKE ONE TABLET BY MOUTH ONE TIME DAILY       There is no refill protocol information for this order        Recent Outpatient Visits              5 months ago [facility]    Office Visit    11 months ago Essential hypertension    [facility]    Office Visit    1 year ago Annual physical exam    [facility] Cristian Addison MD    Office Visit    1 year ago     [facility] Meka Cisse PT    Office Visit    1 year ago     [facility] Meka Cisse, MARGARET    Office Visit          Future Appointments         Provider Department Appt Notes    In 2 weeks Cristian Addison MD [facility] 6M FU

## 2024-09-06 ENCOUNTER — LAB ENCOUNTER (OUTPATIENT)
Dept: LAB | Age: 74
End: 2024-09-06
Attending: INTERNAL MEDICINE
Payer: MEDICARE

## 2024-09-06 ENCOUNTER — OFFICE VISIT (OUTPATIENT)
Dept: INTERNAL MEDICINE CLINIC | Facility: CLINIC | Age: 74
End: 2024-09-06

## 2024-09-06 VITALS
SYSTOLIC BLOOD PRESSURE: 118 MMHG | BODY MASS INDEX: 27.16 KG/M2 | WEIGHT: 194 LBS | DIASTOLIC BLOOD PRESSURE: 62 MMHG | HEIGHT: 71 IN | HEART RATE: 66 BPM

## 2024-09-06 DIAGNOSIS — E87.6 HYPOKALEMIA: ICD-10-CM

## 2024-09-06 DIAGNOSIS — I10 ESSENTIAL HYPERTENSION: Primary | ICD-10-CM

## 2024-09-06 DIAGNOSIS — I25.10 ASHD (ARTERIOSCLEROTIC HEART DISEASE): ICD-10-CM

## 2024-09-06 LAB
ANION GAP SERPL CALC-SCNC: 6 MMOL/L (ref 0–18)
BUN BLD-MCNC: 15 MG/DL (ref 9–23)
BUN/CREAT SERPL: 12.9 (ref 10–20)
CALCIUM BLD-MCNC: 10.1 MG/DL (ref 8.7–10.4)
CHLORIDE SERPL-SCNC: 101 MMOL/L (ref 98–112)
CO2 SERPL-SCNC: 30 MMOL/L (ref 21–32)
CREAT BLD-MCNC: 1.16 MG/DL
EGFRCR SERPLBLD CKD-EPI 2021: 65 ML/MIN/1.73M2 (ref 60–?)
FASTING STATUS PATIENT QL REPORTED: NO
GLUCOSE BLD-MCNC: 102 MG/DL (ref 70–99)
MAGNESIUM SERPL-MCNC: 1.7 MG/DL (ref 1.6–2.6)
OSMOLALITY SERPL CALC.SUM OF ELEC: 285 MOSM/KG (ref 275–295)
POTASSIUM SERPL-SCNC: 4.3 MMOL/L (ref 3.5–5.1)
SODIUM SERPL-SCNC: 137 MMOL/L (ref 136–145)

## 2024-09-06 PROCEDURE — G2211 COMPLEX E/M VISIT ADD ON: HCPCS | Performed by: INTERNAL MEDICINE

## 2024-09-06 PROCEDURE — 3008F BODY MASS INDEX DOCD: CPT | Performed by: INTERNAL MEDICINE

## 2024-09-06 PROCEDURE — 1160F RVW MEDS BY RX/DR IN RCRD: CPT | Performed by: INTERNAL MEDICINE

## 2024-09-06 PROCEDURE — 80048 BASIC METABOLIC PNL TOTAL CA: CPT

## 2024-09-06 PROCEDURE — 3074F SYST BP LT 130 MM HG: CPT | Performed by: INTERNAL MEDICINE

## 2024-09-06 PROCEDURE — 99214 OFFICE O/P EST MOD 30 MIN: CPT | Performed by: INTERNAL MEDICINE

## 2024-09-06 PROCEDURE — 1159F MED LIST DOCD IN RCRD: CPT | Performed by: INTERNAL MEDICINE

## 2024-09-06 PROCEDURE — 3078F DIAST BP <80 MM HG: CPT | Performed by: INTERNAL MEDICINE

## 2024-09-06 PROCEDURE — 1126F AMNT PAIN NOTED NONE PRSNT: CPT | Performed by: INTERNAL MEDICINE

## 2024-09-06 PROCEDURE — 36415 COLL VENOUS BLD VENIPUNCTURE: CPT

## 2024-09-06 PROCEDURE — 83735 ASSAY OF MAGNESIUM: CPT

## 2024-09-06 NOTE — PATIENT INSTRUCTIONS
Your blood pressure today was good at 118/62  Await results of today's blood tests  Continue your current medications, healthy diet and regular activity  Please schedule a visit in January

## 2024-09-06 NOTE — PROGRESS NOTES
Amaris Ocasio is a 74 year old male.   Chief Complaint   Patient presents with    Hypertension    Hypercholesterolemia     HPI:   Amaris presents this afternoon for 6-month follow-up of chronic medical conditions including ASHD and hypertension.  He was found to be hypokalemic at his physical in March and is now on potassium supplementation.    He has been feeling well.  He checks his blood pressure twice every day and readings are typically 100-110s/60-70s.  Diet healthy and he is active with yard work.  Weight down 7 pounds since physical in March.  No headaches.  No lightheadedness or dizziness.  No palpitations or chest pain.  No shortness of breath.    Medications reviewed, as listed below.  Upcoming appointment in 3 days with Cardiology.  Current Outpatient Medications   Medication Sig Dispense Refill    Potassium Chloride ER 20 MEQ Oral Tab CR Take 1 tablet by mouth daily. 90 tablet 0    temazepam 30 MG Oral Cap Take 1 capsule (30 mg total) by mouth nightly as needed for Sleep. 30 capsule 0    hydroCHLOROthiazide 25 MG Oral Tab Take 1 tablet (25 mg total) by mouth every morning. 90 tablet 3    tamsulosin 0.4 MG Oral Cap Take 1 capsule (0.4 mg total) by mouth daily. TAKE 1/2 HOUR FOLLOWING THE SAME MEAL EACH DAY 90 capsule 3    atorvastatin 80 MG Oral Tab Take 1 tablet (80 mg total) by mouth every evening. 90 tablet 3    gabapentin 100 MG Oral Cap Take 2 capsules (200 mg total) by mouth 2 (two) times daily. 360 capsule 3    lisinopril 10 MG Oral Tab Take 1 tablet (10 mg total) by mouth daily.      clopidogrel 75 MG Oral Tab Take 1 tablet (75 mg total) by mouth daily. (Patient taking differently: Take 1 tablet (75 mg total) by mouth daily. One pill daily Monday Wednesday Friday) 90 tablet 3    aspirin 81 MG Oral Tab Take 1 tablet (81 mg total) by mouth daily.      acetaminophen 500 MG Oral Tab Take 1 tablet (500 mg total) by mouth every 6 (six) hours as needed for Pain.      Cholecalciferol (VITAMIN D)  1000 UNITS Oral Cap Take 1 capsule by mouth daily.         No Known Allergies   Past Medical History:    ASHD (arteriosclerotic heart disease)    Acute inferolateral MI s/p stent ×3; Lexiscan GXT 3-23 with small moderate intensity reversible lateral wall defect with EF 70%    Atherosclerosis of aorta ([facility])    CXR 2-13    BPH (benign prostatic hyperplasia)    COPD (chronic obstructive pulmonary disease) ([facility])    CXR 1-22    Erectile dysfunction    Essential hypertension    History of colon polyps    Hypercholesterolemia    Ischemic cardiomyopathy    EF 45-50% on 9/2015; Echo 3-21 with mild inferolateral HK, EF 45%, mild MR, mild LAE    Lumbar spinal stenosis    MRI 8-18 with lumbar spinal stenosis moderate-severe L3-L4, moderate L2-L3, mild-moderate L4-L5 and mild L1-L2 and L5-S1    Osteoarthritis    Right L1-2 synovical cyst     Past Surgical History:   Procedure Laterality Date    Angioplasty (coronary)  12/29/2013    Colonoscopy N/A 08/03/2021    Procedure: COLONOSCOPY;  Surgeon: Morgan Rosales MD;  Location: [facility] ENDOSCOPY    Coronary stent placement  2013    Foot/toes surgery proc unlisted Right 1974    Traumatic amputation first and second toes    Inguinal hernia repair Right 06/21/2017      Social History:  Social History     Socioeconomic History    Marital status:     Number of children: 3   Occupational History    Occupation: [employer],    Tobacco Use    Smoking status: Former     Current packs/day: 0.00     Average packs/day: 1 pack/day for 10.0 years (10.0 ttl pk-yrs)     Types: Cigarettes     Start date: 12/29/2003     Quit date: 12/29/2013     Years since quitting: 10.6    Smokeless tobacco: Never   Vaping Use    Vaping status: Never Used   Substance and Sexual Activity    Alcohol use: Yes     Alcohol/week: 0.0 standard drinks of alcohol     Comment: 1-2 beers weekly    Drug use: No   Other Topics Concern    Caffeine Concern Yes     Comment: Coffee, 4 cups daily;      Exercise No   Social History Narrative    The patient does not use an assistive device..      The patient does live in a home with stairs.        EXAM:   GENERAL: Pleasant male appearing well in no distress  /62   Pulse 66   Ht 5' 11\" (1.803 m)   Wt 194 lb (88 kg)   BMI 27.06 kg/m²   LUNGS: Resonant to percussion and clear to auscultation  CARDIAC: Rhythm regular S1 S2 normal without murmur or edema  ABDOMEN: Bowel sounds normal soft nontender       ASSESSMENT AND PLAN:   1. Essential hypertension  Well-controlled  Continue current medications, healthy diet and regular physical activity  Medicare physical in 6 months.  He will establish with new PCP in January upon my correction    2. ASHD (arteriosclerotic heart disease)  Asymptomatic.  Continue current medications.  Cardiology follow-up in 3 days    3. Hypokalemia  Now on potassium supplementation  Check BMP and magnesium today.  Order sent  - Basic Metabolic Panel (8); Future  - Magnesium; Future      The patient indicates understanding of these issues and agrees to the plan.  The patient is asked to return in January.    Cristian Addison MD  9/6/2024  1:17 PM

## 2024-09-12 DIAGNOSIS — F51.04 CHRONIC INSOMNIA: ICD-10-CM

## 2024-09-16 DIAGNOSIS — F51.04 CHRONIC INSOMNIA: ICD-10-CM

## 2024-09-16 RX ORDER — TEMAZEPAM 30 MG/1
30 CAPSULE ORAL NIGHTLY PRN
Qty: 30 CAPSULE | Refills: 0 | OUTPATIENT
Start: 2024-09-16

## 2024-09-16 RX ORDER — TEMAZEPAM 30 MG
30 CAPSULE ORAL NIGHTLY PRN
Qty: 30 CAPSULE | Refills: 0 | Status: SHIPPED | OUTPATIENT
Start: 2024-09-16

## 2024-09-16 NOTE — TELEPHONE ENCOUNTER
Please review; protocol failed/ has no protocol      Recent fills: 08/16/2024,07/16/2024,06/17/2024  Last Rx written: 08/16/2024  Last Office Visit: 09/06/2024    Recent Visits  Date Type Provider Dept   09/06/24 Office Visit Cristian Addison MD [facility]     Future Appointments  Date Type Provider Dept   01/08/25 Appointment Bubba Nobles MD [facility]   Showing future appointments within next 150 days with a meds authorizing provider and meeting all other requirements      Requested Prescriptions   Pending Prescriptions Disp Refills    TEMAZEPAM 30 MG Oral Cap [Pharmacy Med Name: Temazepam 30 Mg Cap Spec] 30 capsule 0     Sig: Take 1 capsule  by mouth nightly as needed for Sleep.       Controlled Substance Medication Failed - 9/12/2024  9:39 AM        Failed - This medication is a controlled substance - forward to provider to refill           Recent Outpatient Visits              1 week ago Essential hypertension    [facility] Cristian Addison MD    Office Visit    6 months ago Annual physical exam    [facility] Cristian Quintanilla MD    Office Visit    12 months ago Essential hypertension    [facility] Cristian Addison MD    Office Visit    1 year ago [facility] physical exam    [facility]    Office Visit    1 year ago     [facility] Meka Cisse, PT    Office Visit          Future Appointments         Provider Department Appt Notes    In 3 months Bubba Nobles MD [facility] care refills follow up  former patient of Dr Addison

## 2024-10-13 DIAGNOSIS — F51.04 CHRONIC INSOMNIA: ICD-10-CM

## 2024-10-14 DIAGNOSIS — F51.04 CHRONIC INSOMNIA: ICD-10-CM

## 2024-10-16 RX ORDER — TEMAZEPAM 30 MG/1
30 CAPSULE ORAL NIGHTLY PRN
Qty: 30 CAPSULE | Refills: 0 | Status: SHIPPED | OUTPATIENT
Start: 2024-10-16

## 2024-10-16 RX ORDER — TEMAZEPAM 30 MG/1
30 CAPSULE ORAL NIGHTLY PRN
Qty: 30 CAPSULE | Refills: 0 | OUTPATIENT
Start: 2024-10-16

## 2024-10-16 NOTE — TELEPHONE ENCOUNTER
Please review. Protocol Failed; No Protocol      Recent fills: 7/16/2024, 8/16/2024, 9/16/2024  Last Rx written: 9/16/2024  Last office visit: 9/6/2024      Future Appointments  Date Type Provider Dept   01/08/25 Appointment Bubba Nobles MD [facility]   Showing future appointments within next 150 days with a meds authorizing provider and meeting all other requirements        Requested Prescriptions   Pending Prescriptions Disp Refills    TEMAZEPAM 30 MG Oral Cap [Pharmacy Med Name: Temazepam 30 Mg Cap Spec] 30 capsule 0     Sig: Take 1 capsule  by mouth nightly as needed for Sleep.       Controlled Substance Medication Failed - 10/16/2024  9:51 AM        Failed - This medication is a controlled substance - forward to provider to refill               Future Appointments         Provider Department Appt Notes    In 2 months Bubba Nobles MD [facility] care refills follow up  former patient of Dr Addison          Recent Outpatient Visits              1 month ago [facility]    Office Visit    7 months ago [facility] physical [facility]    Office Visit    1 year ago Essential hypertension    [facility]    Office Visit    1 year ago Annual physical exam    [facility]    Office Visit    2 years ago     [facility] Meka Cisse, PT    Office Visit

## 2024-11-12 DIAGNOSIS — F51.04 CHRONIC INSOMNIA: ICD-10-CM

## 2024-11-15 RX ORDER — TEMAZEPAM 30 MG/1
30 CAPSULE ORAL NIGHTLY PRN
Qty: 30 CAPSULE | Refills: 0 | OUTPATIENT
Start: 2024-11-15

## 2024-11-15 RX ORDER — TEMAZEPAM 30 MG/1
30 CAPSULE ORAL NIGHTLY PRN
Qty: 30 CAPSULE | Refills: 0 | Status: SHIPPED | OUTPATIENT
Start: 2024-11-15

## 2024-11-15 NOTE — TELEPHONE ENCOUNTER
Please review.  Protocol failed / Has no protocol.    Temazepam 30mg Recent fills each quantity 30 : 7/16, 8/16, 9/16, 10/16  Last prescription written: 10/16/24  Last office visit: 9/6/24    Future Appointments  Date Type Provider Dept   01/08/25 Appointment Bubba Nobles MD [facility]        Requested Prescriptions   Pending Prescriptions Disp Refills    temazepam 30 MG Oral Cap 30 capsule 0     Sig: Take 1 capsule (30 mg total) by mouth nightly as needed for Sleep.       Controlled Substance Medication Failed - 11/15/2024  2:53 PM        Failed - This medication is a controlled substance - forward to provider to refill           Future Appointments         Provider Department Appt Notes    In 1 month Bubba Nobles MD [facility] care refills follow up  former patient of Dr Addison          Recent Outpatient Visits              2 months ago [facility]ential hypertension    [facility]    Office Visit    8 months ago Annual physical exam    [facility]    Office Visit    1 year ago Essential hypertension    [facility]    Office Visit    1 year ago Annual physical exam    [facility]    Office Visit    2 years ago     [facility] Meka Cisse PT    Office Visit

## 2024-11-27 RX ORDER — POTASSIUM CHLORIDE 1500 MG/1
1 TABLET, EXTENDED RELEASE ORAL DAILY
Qty: 90 TABLET | Refills: 1 | Status: SHIPPED | OUTPATIENT
Start: 2024-11-27

## 2024-11-27 NOTE — TELEPHONE ENCOUNTER
Please review. Protocol Failed or has No Protocol.    Requested Prescriptions   Pending Prescriptions Disp Refills    POTASSIUM CHLORIDE ER 20 MEQ Oral Tab CR [Pharmacy Med Name: Potassium Chloride Er 20 Meq Tab Adva] 90 tablet 0     Sig: Take 1 tablet by mouth daily.       There is no refill protocol information for this order          Recent Outpatient Visits              2 months ago Essential hypertension    [facility]    Office Visit    8 months ago Annual physical exam    [facility]    Office Visit    1 year ago Essential hypertension    [facility]    Office Visit    1 year ago Annual physical exam    [facility]    Office Visit    2 years ago     [facility] Meka Cisse, MARGARET    Office Visit            Future Appointments         Provider Department Appt Notes    In 1 month Bubba Nobles MD [facility] care refills follow up  former patient of Dr Addison

## 2024-12-11 DIAGNOSIS — F51.04 CHRONIC INSOMNIA: ICD-10-CM

## 2024-12-14 DIAGNOSIS — F51.04 CHRONIC INSOMNIA: ICD-10-CM

## 2024-12-15 RX ORDER — TEMAZEPAM 30 MG/1
30 CAPSULE ORAL NIGHTLY PRN
Qty: 30 CAPSULE | Refills: 0 | Status: SHIPPED | OUTPATIENT
Start: 2024-12-15

## 2024-12-15 RX ORDER — TEMAZEPAM 30 MG/1
30 CAPSULE ORAL NIGHTLY PRN
Qty: 30 CAPSULE | Refills: 0 | OUTPATIENT
Start: 2024-12-15

## 2024-12-15 NOTE — TELEPHONE ENCOUNTER
Please review. Protocol Failed; No Protocol      Recent fills: 9/16/2024, 10/16/2024, 11/15/2024  Last Rx written: 11/15/2024  Last office visit: 9/6/2024      Future Appointments  Date Type Provider Dept   01/08/25 Appointment Bubba Nobles MD [facility]   Showing future appointments within next 150 days with a meds authorizing provider and meeting all other requirements        Requested Prescriptions   Pending Prescriptions Disp Refills    TEMAZEPAM 30 MG Oral Cap [Pharmacy Med Name: Temazepam 30 Mg Cap Spec] 30 capsule 0     Sig: Take 1 capsule  by mouth nightly as needed for Sleep.       Controlled Substance Medication Failed - 12/15/2024  8:40 AM        Failed - This medication is a controlled substance - forward to provider to refill               Future Appointments         Provider Department Appt Notes    In 3 weeks Bubba Nobles MD [facility] care refills follow up  former patient of Dr Addison          Recent Outpatient Visits              3 months ago Essential hypertension    [facility]    Office Visit    9 months ago Annual physical exam    [facility]    Office Visit    1 year ago Essential hypertension    [facility]    Office Visit    1 year ago Annual physical exam    [facility]    Office Visit    2 years ago     [facility] Meka Cisse, PT    Office Visit

## 2025-01-07 ENCOUNTER — TELEPHONE (OUTPATIENT)
Dept: INTERNAL MEDICINE CLINIC | Facility: CLINIC | Age: 75
End: 2025-01-07

## 2025-01-08 ENCOUNTER — OFFICE VISIT (OUTPATIENT)
Dept: INTERNAL MEDICINE CLINIC | Facility: CLINIC | Age: 75
End: 2025-01-08
Payer: MEDICARE

## 2025-01-08 VITALS
HEIGHT: 71 IN | DIASTOLIC BLOOD PRESSURE: 72 MMHG | HEART RATE: 58 BPM | WEIGHT: 206 LBS | BODY MASS INDEX: 28.84 KG/M2 | SYSTOLIC BLOOD PRESSURE: 120 MMHG | RESPIRATION RATE: 20 BRPM | TEMPERATURE: 98 F

## 2025-01-08 DIAGNOSIS — J44.9 CHRONIC OBSTRUCTIVE PULMONARY DISEASE, UNSPECIFIED COPD TYPE (HCC): ICD-10-CM

## 2025-01-08 DIAGNOSIS — F51.04 CHRONIC INSOMNIA: ICD-10-CM

## 2025-01-08 DIAGNOSIS — I25.5 ISCHEMIC CARDIOMYOPATHY: ICD-10-CM

## 2025-01-08 DIAGNOSIS — I10 ESSENTIAL HYPERTENSION: Primary | ICD-10-CM

## 2025-01-08 DIAGNOSIS — E78.00 HYPERCHOLESTEROLEMIA: ICD-10-CM

## 2025-01-08 DIAGNOSIS — M48.062 LUMBAR STENOSIS WITH NEUROGENIC CLAUDICATION: ICD-10-CM

## 2025-01-08 DIAGNOSIS — N40.0 BENIGN PROSTATIC HYPERPLASIA WITHOUT LOWER URINARY TRACT SYMPTOMS: ICD-10-CM

## 2025-01-08 DIAGNOSIS — I70.0 ATHEROSCLEROSIS OF AORTA (HCC): ICD-10-CM

## 2025-01-08 DIAGNOSIS — I25.10 ASHD (ARTERIOSCLEROTIC HEART DISEASE): ICD-10-CM

## 2025-01-08 PROCEDURE — 1159F MED LIST DOCD IN RCRD: CPT | Performed by: INTERNAL MEDICINE

## 2025-01-08 PROCEDURE — 3074F SYST BP LT 130 MM HG: CPT | Performed by: INTERNAL MEDICINE

## 2025-01-08 PROCEDURE — 1170F FXNL STATUS ASSESSED: CPT | Performed by: INTERNAL MEDICINE

## 2025-01-08 PROCEDURE — G2211 COMPLEX E/M VISIT ADD ON: HCPCS | Performed by: INTERNAL MEDICINE

## 2025-01-08 PROCEDURE — 99214 OFFICE O/P EST MOD 30 MIN: CPT | Performed by: INTERNAL MEDICINE

## 2025-01-08 PROCEDURE — 3078F DIAST BP <80 MM HG: CPT | Performed by: INTERNAL MEDICINE

## 2025-01-08 PROCEDURE — 1160F RVW MEDS BY RX/DR IN RCRD: CPT | Performed by: INTERNAL MEDICINE

## 2025-01-08 PROCEDURE — 1125F AMNT PAIN NOTED PAIN PRSNT: CPT | Performed by: INTERNAL MEDICINE

## 2025-01-08 PROCEDURE — 3008F BODY MASS INDEX DOCD: CPT | Performed by: INTERNAL MEDICINE

## 2025-01-08 RX ORDER — TEMAZEPAM 30 MG/1
30 CAPSULE ORAL NIGHTLY PRN
Qty: 30 CAPSULE | Refills: 1 | Status: SHIPPED | OUTPATIENT
Start: 2025-01-08

## 2025-01-08 NOTE — PROGRESS NOTES
HPI:    Patient ID: Amaris Ocasio is a 74 year old male.    HPI  Patient is here to establish care with new primary care physician.  Previous physician has retired.  Last seen by that doctor on .  At that time no changes were made.  Normal basic metabolic profile and magnesium.  Prior recent history of low potassium.  Since that visit with the PCP, he has seen cardiology.  History of coronary artery disease with 3 stents placed in the past.  Also ischemic cardiomyopathy.  Other issues include hypertension, COPD, BPH, lumbar spinal stenosis.  Current medications reviewed.  Health maintenance and vaccination status reviewed.    No major issues. Has chronic back pain daily. He brings in a parking form for handicapped due to back and heart. Also, requests a refill on temezepam which it taken nightly for sleep; it is effective. Patient does check blood pressure at home. It has been running around 120s/70s. Breathing can be an issue at times, particularly with going up and down stairs. Meds reviewed. The tamsulosin help the urine flow. Takes the Plavix 3 times a week. No tobacco for 10-11 years (since heart attack). Is active outside in the summer time. His wife  two years ago.         Patient Active Problem List   Diagnosis    Essential hypertension    Benign prostatic hyperplasia without lower urinary tract symptoms    ASHD (arteriosclerotic heart disease)    Ischemic cardiomyopathy    Hypercholesterolemia    History of colon polyps    Erectile dysfunction    Lumbar stenosis with neurogenic claudication    Atherosclerosis of aorta ([facility])    Right L1-2 synovical cyst    COPD (chronic obstructive pulmonary disease) ([facility])          HISTORY:  Past Medical History:    ASHD (arteriosclerotic heart disease)    Acute inferolateral MI s/p stent ×3; Lexiscan GXT 3-23 with small moderate intensity reversible lateral wall defect with EF 70%    Atherosclerosis of aorta ([facility])    CXR 2-13    BPH (benign prostatic  hyperplasia)    COPD (chronic obstructive pulmonary disease) ([facility])    CXR 1-22    Erectile dysfunction    Essential hypertension    History of colon polyps    Hypercholesterolemia    Ischemic cardiomyopathy    EF 45-50% on 2015; Echo 3-21 with mild inferolateral HK, EF 45%, mild MR, mild LAE    Lumbar spinal stenosis    MRI 8-18 with lumbar spinal stenosis moderate-severe L3-L4, moderate L2-L3, mild-moderate L4-L5 and mild L1-L2 and L5-S1    Osteoarthritis    Right L1-2 synovical cyst      Past Surgical History:   Procedure Laterality Date    Angioplasty (coronary)  2013    Colonoscopy N/A 2021    Procedure: COLONOSCOPY;  Surgeon: Morgan Rosales MD;  Location: [facility] ENDOSCOPY    Coronary stent placement      Foot/toes surgery proc unlisted Right     Traumatic amputation first and second toes    Inguinal hernia repair Right 2017      Family History   Problem Relation Age of Onset    Other (COPD [Other]) Father     Other (Brain Cancer [Other]) Mother     Cancer Mother       Social History     Socioeconomic History    Marital status:     Number of children: 3   Occupational History    Occupation: [employer],    Tobacco Use    Smoking status: Former     Current packs/day: 0.00     Average packs/day: 1 pack/day for 10.0 years (10.0 ttl pk-yrs)     Types: Cigarettes     Start date: 2003     Quit date: 2013     Years since quittin.0    Smokeless tobacco: Never   Vaping Use    Vaping status: Never Used   Substance and Sexual Activity    Alcohol use: Yes     Alcohol/week: 0.0 standard drinks of alcohol     Comment: 1-2 beers weekly    Drug use: No   Other Topics Concern    Caffeine Concern Yes     Comment: Coffee, 4 cups daily;     Exercise No   Social History Narrative    The patient does not use an assistive device..      The patient does live in a home with stairs.          Review of Systems   Constitutional:  Negative for chills and fever.    Respiratory:  Negative for shortness of breath.    Cardiovascular:  Negative for chest pain.   Gastrointestinal:  Negative for abdominal pain, diarrhea and nausea.   Genitourinary:  Negative for dysuria.             Current Outpatient Medications   Medication Sig Dispense Refill    temazepam 30 MG Oral Cap Take 1 capsule (30 mg total) by mouth nightly as needed for Sleep. 30 capsule 1    Potassium Chloride ER 20 MEQ Oral Tab CR Take 1 tablet by mouth daily. 90 tablet 1    hydroCHLOROthiazide 25 MG Oral Tab Take 1 tablet (25 mg total) by mouth every morning. 90 tablet 3    tamsulosin 0.4 MG Oral Cap Take 1 capsule (0.4 mg total) by mouth daily. TAKE 1/2 HOUR FOLLOWING THE SAME MEAL EACH DAY 90 capsule 3    atorvastatin 80 MG Oral Tab Take 1 tablet (80 mg total) by mouth every evening. 90 tablet 3    gabapentin 100 MG Oral Cap Take 2 capsules (200 mg total) by mouth 2 (two) times daily. 360 capsule 3    lisinopril 10 MG Oral Tab Take 1 tablet (10 mg total) by mouth daily.      clopidogrel 75 MG Oral Tab Take 1 tablet (75 mg total) by mouth daily. (Patient taking differently: Take 1 tablet (75 mg total) by mouth daily. One pill daily Monday Wednesday Friday) 90 tablet 3    aspirin 81 MG Oral Tab Take 1 tablet (81 mg total) by mouth daily.      acetaminophen 500 MG Oral Tab Take 1 tablet (500 mg total) by mouth every 6 (six) hours as needed for Pain.      Cholecalciferol (VITAMIN D) 1000 UNITS Oral Cap Take 1 capsule by mouth daily.         Allergies:Allergies[1]     PHYSICAL EXAM:   /72 (BP Location: Left arm, Patient Position: Sitting, Cuff Size: large)   Pulse 58   Temp 98 °F (36.7 °C) (Other)   Resp 20   Ht 5' 11\" (1.803 m)   Wt 206 lb (93.4 kg)   BMI 28.73 kg/m²      Physical Exam  Constitutional:       Appearance: Normal appearance. He is well-developed.   HENT:      Nose: Nose normal.      Mouth/Throat:      Pharynx: No oropharyngeal exudate or posterior oropharyngeal erythema.   Eyes:       Conjunctiva/sclera: Conjunctivae normal.   Neck:      Vascular: No carotid bruit.   Cardiovascular:      Rate and Rhythm: Normal rate and regular rhythm.      Pulses: Normal pulses.      Heart sounds: Normal heart sounds. No murmur heard.  Pulmonary:      Effort: Pulmonary effort is normal.      Breath sounds: Normal breath sounds. No wheezing or rales.   Abdominal:      General: Bowel sounds are normal.      Palpations: Abdomen is soft. There is no mass.      Tenderness: There is no abdominal tenderness.   Musculoskeletal:      Right lower leg: No edema.      Left lower leg: No edema.   Lymphadenopathy:      Cervical: No cervical adenopathy.   Skin:     General: Skin is warm and dry.      Findings: No rash.   Neurological:      General: No focal deficit present.      Mental Status: He is alert.   Psychiatric:         Mood and Affect: Mood normal.         Behavior: Behavior normal.         Thought Content: Thought content normal.          Wt Readings from Last 6 Encounters:   01/08/25 206 lb (93.4 kg)   09/06/24 194 lb (88 kg)   03/06/24 201 lb 9.6 oz (91.4 kg)   09/20/23 182 lb 3.2 oz (82.6 kg)   03/20/23 189 lb (85.7 kg)   09/21/22 185 lb 12.8 oz (84.3 kg)             ASSESSMENT/PLAN:   1. Essential hypertension  Blood pressure is well-controlled.  Medications reviewed in detail with the patient.  Continue current treatment.  Work on diet and exercise as tolerated.  Activity is limited due to back issues and lungs.  Follow-up in 3 to 4 months for Medicare annual visit.    2. Chronic insomnia  Refill given on temazepam.  He takes that nightly.  Seems to work well.  No adverse effects at this time.  - temazepam 30 MG Oral Cap; Take 1 capsule (30 mg total) by mouth nightly as needed for Sleep.  Dispense: 30 capsule; Refill: 1    3. ASHD (arteriosclerotic heart disease)  Asymptomatic.  Continue current treatment and follow-up with cardiology.    4. Ischemic cardiomyopathy  No evidence of volume overload or overt heart  failure at this time.  Continue current treatment and follow-up with cardiology.    5. Chronic obstructive pulmonary disease, unspecified COPD type (HCC)  Lungs are clear.  Patient no longer smokes.  Continue with inhalers.    6. Hypercholesterolemia  Stable.  Continue current treatment.  Work on diet and exercise.    7. Benign prostatic hyperplasia without lower urinary tract symptoms  Reasonably controlled.  Continue with tamsulosin.    8. Atherosclerosis of aorta (H[facility])  Asymptomatic.  Monitor for evidence of peripheral vascular disease.  Work on risk factor management.    9. Lumbar stenosis with neurogenic claudication  Ongoing low back pain on a daily basis.  It is significant and it limits his exercise capacity and ability to walk distances.  Form filled out for handicap parking placard.         Meds This Visit:  Requested Prescriptions     Signed Prescriptions Disp Refills    temazepam 30 MG Oral Cap 30 capsule 1     Sig: Take 1 capsule (30 mg total) by mouth nightly as needed for Sleep.       Imaging & Referrals:  None         Bubba Nobles MD          [1] No Known Allergies

## 2025-03-10 DIAGNOSIS — F51.04 CHRONIC INSOMNIA: ICD-10-CM

## 2025-03-11 DIAGNOSIS — F51.04 CHRONIC INSOMNIA: ICD-10-CM

## 2025-03-12 RX ORDER — TEMAZEPAM 30 MG/1
30 CAPSULE ORAL NIGHTLY PRN
Qty: 30 CAPSULE | Refills: 1 | OUTPATIENT
Start: 2025-03-12

## 2025-03-12 NOTE — TELEPHONE ENCOUNTER
Please review. Protocol Failed; No Protocol      Recent fills: 1/11/2025, 2/10/2025  Last Rx written: 1/8/2025  Last office visit: 1/8/2025    Future Appointments  Date Type Provider Dept   04/21/25 Appointment Bubba Nobles MD [facility]   Showing future appointments within next 150 days with a meds authorizing provider and meeting all other requirements

## 2025-03-13 RX ORDER — TEMAZEPAM 30 MG/1
30 CAPSULE ORAL NIGHTLY PRN
Qty: 30 CAPSULE | Refills: 0 | Status: SHIPPED | OUTPATIENT
Start: 2025-03-13

## 2025-03-20 RX ORDER — GABAPENTIN 100 MG/1
200 CAPSULE ORAL 2 TIMES DAILY
Qty: 360 CAPSULE | Refills: 3 | Status: SHIPPED | OUTPATIENT
Start: 2025-03-20

## 2025-03-20 NOTE — TELEPHONE ENCOUNTER
Refill Per Protocol     Requested Prescriptions   Pending Prescriptions Disp Refills    gabapentin 100 MG Oral Cap 360 capsule 3     Sig: Take 2 capsules (200 mg total) by mouth 2 (two) times daily.       Neurology Medications Passed - 3/20/2025  9:42 AM        Passed - In person appointment or virtual visit in the past 6 mos or appointment in next 3 mos     Recent Outpatient Visits              2 months ago Essential hypertension    [facility] [address], [place] Bubba Nobles MD    Office Visit    6 months ago Essential hypertension    [facility] Michael, MD    Office Visit    1 year ago [facility] physical [facility]    [facility] Visit    1 year [facility]    Office Visit    2 years ago Annual physical exam    [facility]    Office Visit          Future Appointments         Provider Department Appt Notes    In 1 month [facility]6/24                    Passed - Medication is active on med list

## 2025-04-10 DIAGNOSIS — F51.04 CHRONIC INSOMNIA: ICD-10-CM

## 2025-04-14 DIAGNOSIS — F51.04 CHRONIC INSOMNIA: ICD-10-CM

## 2025-04-14 RX ORDER — TEMAZEPAM 30 MG/1
30 CAPSULE ORAL NIGHTLY PRN
Qty: 30 CAPSULE | Refills: 0 | Status: SHIPPED | OUTPATIENT
Start: 2025-04-14

## 2025-04-14 RX ORDER — TEMAZEPAM 30 MG/1
30 CAPSULE ORAL NIGHTLY PRN
Qty: 30 CAPSULE | Refills: 0 | OUTPATIENT
Start: 2025-04-14

## 2025-04-14 NOTE — TELEPHONE ENCOUNTER
Please review.  Protocol Failed.    Recent fills each # 30 : 12/15, 1/11, 2/10, 3/13  Last prescription written: 3/13/25  Last office visit:  1/8/2025    Future Appointments   Date Time Provider Department Center   4/21/2025 11:00 AM Bubba Nobles MD [facility]         Requested Prescriptions   Pending Prescriptions Disp Refills    TEMAZEPAM 30 MG Oral Cap [Pharmacy Med Name: Temazepam 30 Mg Cap Spec] 30 capsule 0     Sig: Take 1 capsule by mouth nightly as needed for Sleep.       Controlled Substance Medication Failed - 4/14/2025  5:50 PM        Failed - This medication is a controlled substance - forward to provider to refill        Passed - Medication is active on med list

## 2025-04-16 PROBLEM — L97.509 ULCER OF FOOT (HCC): Status: RESOLVED | Noted: 2025-04-16 | Resolved: 2025-04-16

## 2025-04-17 RX ORDER — ATORVASTATIN CALCIUM 80 MG/1
80 TABLET, FILM COATED ORAL EVERY EVENING
Qty: 90 TABLET | Refills: 3 | Status: SHIPPED | OUTPATIENT
Start: 2025-04-17

## 2025-04-17 RX ORDER — TAMSULOSIN HYDROCHLORIDE 0.4 MG/1
0.4 CAPSULE ORAL DAILY
Qty: 90 CAPSULE | Refills: 3 | Status: SHIPPED | OUTPATIENT
Start: 2025-04-17

## 2025-04-17 NOTE — TELEPHONE ENCOUNTER
Please review; protocol failed/No Protocol      Written previously by Dr. Addison    Future Appointments   Date Time Provider Department Center   4/21/2025 11:00 AM Bubba Nobles MD [facility]

## 2025-04-21 ENCOUNTER — OFFICE VISIT (OUTPATIENT)
Dept: INTERNAL MEDICINE CLINIC | Facility: CLINIC | Age: 75
End: 2025-04-21

## 2025-04-21 VITALS
SYSTOLIC BLOOD PRESSURE: 106 MMHG | HEIGHT: 71 IN | WEIGHT: 210 LBS | BODY MASS INDEX: 29.4 KG/M2 | DIASTOLIC BLOOD PRESSURE: 58 MMHG | TEMPERATURE: 98 F | HEART RATE: 58 BPM | RESPIRATION RATE: 20 BRPM | OXYGEN SATURATION: 96 %

## 2025-04-21 DIAGNOSIS — Z00.00 ENCOUNTER FOR ANNUAL HEALTH EXAMINATION: Primary | ICD-10-CM

## 2025-04-21 DIAGNOSIS — N40.0 BENIGN PROSTATIC HYPERPLASIA WITHOUT LOWER URINARY TRACT SYMPTOMS: ICD-10-CM

## 2025-04-21 DIAGNOSIS — I25.10 ASHD (ARTERIOSCLEROTIC HEART DISEASE): ICD-10-CM

## 2025-04-21 DIAGNOSIS — I25.5 ISCHEMIC CARDIOMYOPATHY: ICD-10-CM

## 2025-04-21 DIAGNOSIS — G89.29 CHRONIC PAIN OF BOTH FEET: ICD-10-CM

## 2025-04-21 DIAGNOSIS — E78.00 HYPERCHOLESTEROLEMIA: ICD-10-CM

## 2025-04-21 DIAGNOSIS — Z12.5 SCREENING FOR PROSTATE CANCER: ICD-10-CM

## 2025-04-21 DIAGNOSIS — J44.9 CHRONIC OBSTRUCTIVE PULMONARY DISEASE, UNSPECIFIED COPD TYPE (HCC): ICD-10-CM

## 2025-04-21 DIAGNOSIS — M79.671 CHRONIC PAIN OF BOTH FEET: ICD-10-CM

## 2025-04-21 DIAGNOSIS — M79.672 CHRONIC PAIN OF BOTH FEET: ICD-10-CM

## 2025-04-21 DIAGNOSIS — I10 ESSENTIAL HYPERTENSION: ICD-10-CM

## 2025-04-21 DIAGNOSIS — M48.062 LUMBAR STENOSIS WITH NEUROGENIC CLAUDICATION: ICD-10-CM

## 2025-04-21 RX ORDER — LISINOPRIL 5 MG/1
5 TABLET ORAL DAILY
COMMUNITY

## 2025-04-21 RX ORDER — HYDROCHLOROTHIAZIDE 25 MG/1
25 TABLET ORAL EVERY MORNING
Qty: 90 TABLET | Refills: 3 | Status: SHIPPED | OUTPATIENT
Start: 2025-04-21

## 2025-04-21 NOTE — PROGRESS NOTES
Subjective:   Amaris Ocasio is a 75 year old male who presents for a MA AHA (Medicare Advantage Annual Health Assessment) and Subsequent Annual Wellness visit (Pt already had Initial Annual Wellness) and scheduled follow up of multiple significant but stable problems.   History of Present Illness      Patient is here requesting Medicare annual wellness visit and follow-up on chronic medical issues.  I saw him once before in January.  Previous doctor had retired.  No changes made at that time.  Since that time he did see cardiology.  He had a stress test that was positive a few years ago but he was not having any symptoms.  Cardiology felt that if he did have any symptoms he would require an angiogram.  Also with the cardiologist, they decreased lisinopril from 10 to lisinopril 5 mg a day.  Current medications reviewed.  Health maintenance and vaccination status reviewed.  Advanced directives reviewed.    Things are going good. Having some issues with his feet. They feel swollen and look red. Going on for a month or so. No pain with walking. The joints in the feet and toes feel tight.    No tobacco since heart attack 11-12 years ago. EtOH is less now. Diet is ok. Appetite is too good. For exercise, he is not active in the cold months.       History/Other:   Fall Risk Assessment:   He has been screened for Falls and is low risk.      Cognitive Assessment:   He had a completely normal cognitive assessment - see flowsheet entries     Functional Ability/Status:   Amaris Ocasio has some abnormal functions as listed below:  He has Vision problems based on screening of functional status.       Depression Screening (PHQ):  PHQ-2 SCORE: 0  , done 4/21/2025   If you checked off any problems, how difficult have these problems made it for you to do your work, take care of things at home, or get along with other people?: Not difficult at all    Last [place] Suicide Screening on 4/21/2025 was No Risk.           Advanced Directives:   He does NOT have a Living Will. [Do you have a living will?: No]  He does have a POA but we do NOT have it on file in Epic.    Discussed Advance Care Planning with patient (and family/surrogate if present). Standard forms made available to patient in After Visit Summary.      Patient Active Problem List   Diagnosis    Essential hypertension    Benign prostatic hyperplasia without lower urinary tract symptoms    ASHD (arteriosclerotic heart disease)    Ischemic cardiomyopathy    Hypercholesterolemia    History of colon polyps    Erectile dysfunction    Lumbar stenosis with neurogenic claudication    Atherosclerosis of aorta    Right L1-2 synovical cyst    COPD (chronic obstructive pulmonary disease) (HCC)     Allergies:  He has no known allergies.    Current Medications:  Outpatient Medications Marked as Taking for the 4/21/25 encounter (Office Visit) with Bubba Nobles MD   Medication Sig    atorvastatin 80 MG Oral Tab Take 1 tablet (80 mg total) by mouth every evening.    tamsulosin 0.4 MG Oral Cap Take 1 capsule (0.4 mg total) by mouth daily. TAKE 1/2 HOUR FOLLOWING THE SAME MEAL EACH DAY    temazepam 30 MG Oral Cap Take 1 capsule (30 mg total) by mouth nightly as needed for Sleep.    gabapentin 100 MG Oral Cap Take 2 capsules (200 mg total) by mouth 2 (two) times daily.    Potassium Chloride ER 20 MEQ Oral Tab CR Take 1 tablet by mouth daily.    hydroCHLOROthiazide 25 MG Oral Tab Take 1 tablet (25 mg total) by mouth every morning.    lisinopril 10 MG Oral Tab Take 1 tablet (10 mg total) by mouth daily. (Patient taking differently: Take 0.5 tablets (5 mg total) by mouth in the morning.)    clopidogrel 75 MG Oral Tab Take 1 tablet (75 mg total) by mouth daily.    aspirin 81 MG Oral Tab Take 1 tablet (81 mg total) by mouth daily.    acetaminophen 500 MG Oral Tab Take 1 tablet (500 mg total) by mouth every 6 (six) hours as needed for Pain.    Cholecalciferol (VITAMIN D) 1000 UNITS Oral  Cap Take 1 capsule by mouth daily.         Medical History:  He  has a past medical history of ASHD (arteriosclerotic heart disease) (), Atherosclerosis of aorta, BPH (benign prostatic hyperplasia), COPD (chronic obstructive pulmonary disease) (HCC), Erectile dysfunction, Essential hypertension, History of colon polyps (2005), Hypercholesterolemia, Ischemic cardiomyopathy, Lumbar spinal stenosis (2016), Osteoarthritis, and Right L1-2 synovical cyst (10/09/2018).  Surgical History:  He  has a past surgical history that includes foot/toes surgery proc unlisted (Right, ); coronary stent placement (); Inguinal hernia repair (Right, 2017); angioplasty (coronary) (2013); and colonoscopy (N/A, 2021).   Family History:  His family history includes Brain Cancer in his mother; COPD in his father; Cancer in his mother.  Social History:  He  reports that he quit smoking about 11 years ago. His smoking use included cigarettes. He started smoking about 21 years ago. He has a 10 pack-year smoking history. He has never used smokeless tobacco. He reports current alcohol use. He reports that he does not use drugs.    Tobacco:  He smoked tobacco in the past but quit greater than 12 months ago.  Social History     Tobacco Use   Smoking Status Former    Current packs/day: 0.00    Average packs/day: 1 pack/day for 10.0 years (10.0 ttl pk-yrs)    Types: Cigarettes    Start date: 2003    Quit date: 2013    Years since quittin.3   Smokeless Tobacco Never        CAGE Alcohol Screen:   CAGE screening score of 0 on 2025, showing low risk of alcohol abuse.      Patient Care Team:  Bubba Nobles MD as PCP - General (Internal Medicine)  Andrade Gant DO (Physical Medicine)  Ean Osorio MD (CARDIOLOGY)  Tasia Virk Amy, PT as Physical Therapist (Physical Therapy)    Review of Systems   Constitutional:  Negative for chills and fever.   Respiratory:  Negative for  shortness of breath.    Cardiovascular:  Negative for chest pain and palpitations.   Gastrointestinal:  Negative for abdominal pain, diarrhea and nausea.   Psychiatric/Behavioral:  Negative for dysphoric mood. The patient is not nervous/anxious.           Objective:   Physical Exam  Constitutional:       Appearance: Normal appearance. He is well-developed.   HENT:      Right Ear: Tympanic membrane and ear canal normal.      Left Ear: Tympanic membrane and ear canal normal.      Nose: Nose normal.      Mouth/Throat:      Pharynx: No oropharyngeal exudate or posterior oropharyngeal erythema.   Eyes:      Conjunctiva/sclera: Conjunctivae normal.      Pupils: Pupils are equal, round, and reactive to light.   Neck:      Thyroid: No thyromegaly.      Vascular: No carotid bruit.   Cardiovascular:      Rate and Rhythm: Normal rate and regular rhythm.      Pulses: Normal pulses.      Heart sounds: Normal heart sounds. No murmur heard.  Pulmonary:      Effort: Pulmonary effort is normal.      Breath sounds: Normal breath sounds. No wheezing or rales.   Abdominal:      General: Bowel sounds are normal.      Palpations: Abdomen is soft. There is no mass.      Tenderness: There is no abdominal tenderness.   Genitourinary:     Penis: Normal.       Testes: Normal.   Musculoskeletal:      Right lower leg: No edema.      Left lower leg: No edema.   Lymphadenopathy:      Cervical: No cervical adenopathy.   Skin:     General: Skin is warm and dry.      Findings: No rash.   Neurological:      General: No focal deficit present.      Mental Status: He is alert.      Cranial Nerves: No cranial nerve deficit.      Coordination: Coordination normal.   Psychiatric:         Mood and Affect: Mood normal.         Behavior: Behavior normal.         Thought Content: Thought content normal.         Judgment: Judgment normal.          /58 (BP Location: Left arm, Patient Position: Sitting, Cuff Size: large)   Pulse 58   Temp 98 °F (36.7 °C)  (Other)   Resp 20   Ht 5' 11\" (1.803 m)   Wt 210 lb (95.3 kg)   SpO2 96%   BMI 29.29 kg/m²  Estimated body mass index is 29.29 kg/m² as calculated from the following:    Height as of this encounter: 5' 11\" (1.803 m).    Weight as of this encounter: 210 lb (95.3 kg).    Medicare Hearing Assessment:   Hearing Screening    Screening Method: Questionnaire  I have a problem hearing over the telephone: No I have trouble following the conversations when two or more people are talking at the same time: No   I have trouble understanding things on the TV: No I have to strain to understand conversations: No   I have to worry about missing the telephone ring or doorbell: No I have trouble hearing conversations in a noisy background such as a crowded room or restaurant: No   I get confused about where sounds come from: No I misunderstand some words in a sentence and need to ask people to repeat themselves: No   I especially have trouble understanding the speech of women and children: No I have trouble understanding the speaker in a large room such as at a meeting or place of Denominational: No   Many people I talk to seem to mumble (or don't speak clearly): No People get annoyed because I misunderstand what they say: No   I misunderstand what others are saying and make inappropriate responses: No I avoid social activities because I cannot hear well and fear I will reply improperly: No   Family members and friends have told me they think I may have hearing loss: No             Visual Acuity:   Right Eye Visual Acuity: Corrected Right Eye Chart Acuity: 20/30   Left Eye Visual Acuity: Corrected Left Eye Chart Acuity: 20/30   Both Eyes Visual Acuity: Corrected Both Eyes Chart Acuity: 20/25   Able To Tolerate Visual Acuity: Yes        Assessment & Plan:   Amaris Ocasio is a 75 year old male who presents for a Medicare Assessment.     1. Encounter for annual health examination  Physical exam is unremarkable.  Active issues as  below.  Health maintenance issues reviewed.  Patient given printed information about advanced directives.  Strongly encouraged that he get his son to discuss this and get things right now and return the forms.    2. Essential hypertension  Well-controlled at this time.  Continue current treatment.  Work on diet and exercise as tolerated.  We will check fasting blood work and contact patient with results.  Follow-up here in 6 months.  - CBC With Differential With Platelet; Future  - Comp Metabolic Panel (14); Future  - Lipid Panel; Future  - TSH W Reflex To Free T4; Future  - Vitamin B12; Future    3. ASHD (arteriosclerotic heart disease)  Asymptomatic.  Continue current treatment.  Follow-up with cardiology.  - US ART LOWER EXT BILAT DOPPLER W SEG PRESSURES (CPT=93923); Future    4. Ischemic cardiomyopathy  No evidence of volume overload at this time.  Continue current treatment.    5. Chronic obstructive pulmonary disease, unspecified COPD type (HCC)  This was documented on previous imaging studies of the lungs and chest.  He is asymptomatic.  On no inhalers.  He does not smoke at this time.    6. Hypercholesterolemia  Check blood work.  Adjust dose of atorvastatin if needed.  Already on 80 mg.    7. Benign prostatic hyperplasia without lower urinary tract symptoms  Stable and controlled.  Continue with tamsulosin.    8. Lumbar stenosis with neurogenic claudication  Stable.  Continue current treatment with gabapentin.    9. Chronic pain of both feet  Patient with pain in both feet and some slight discoloration.  Pulses are little bit weak.  We will check arterial blood flow studies.  He is not having any claudication but really he is still minimally active I would not expect it to happen.  - US ART LOWER EXT BILAT DOPPLER W SEG PRESSURES (CPT=93923); Future    10. Screening for prostate cancer  Check PSA.  - PSA Total, Screen; Future      Assessment & Plan      The patient indicates understanding of these issues  and agrees to the plan.  Reinforced healthy diet, lifestyle, and exercise.      No follow-ups on file.     Bubba Nobles MD, 4/21/2025     Supplementary Documentation:   General Health:  In the past six months, have you lost more than 10 pounds without trying?: (Patient-Rptd) 2 - No  Has your appetite been poor?: (Patient-Rptd) No  Type of Diet: (Patient-Rptd) Low Salt  How would you describe your daily physical activity?: (Patient-Rptd) None  How would you describe your current health state?: (Patient-Rptd) Good  How do you maintain positive mental well-being?: (Patient-Rptd) Puzzles, Games, Visiting Family  On a scale of 0 to 10, with 0 being no pain and 10 being severe pain, what is your pain level?: (Patient-Rptd) 5 - (Moderate)  In the past six months, have you experienced urine leakage?: (Patient-Rptd) 0-No  At any time do you feel concerned for the safety/well-being of yourself and/or your children, in your home or elsewhere?: No  Have you had any immunizations at another office such as Influenza, Hepatitis B, Tetanus, or Pneumococcal?: (Patient-Rptd) No    Health Maintenance   Topic Date Due    Zoster Vaccines (2 of 3) 12/20/2014    COVID-19 Vaccine (4 - 2024-25 season) 09/01/2024    Annual Well Visit  01/01/2025    Colorectal Cancer Screening  08/03/2026    Influenza Vaccine  Completed    Annual Depression Screening  Completed    Fall Risk Screening (Annual)  Completed    Pneumococcal Vaccine: 50+ Years  Completed    Meningococcal B Vaccine  Aged Out

## 2025-04-22 ENCOUNTER — LAB ENCOUNTER (OUTPATIENT)
Dept: LAB | Facility: HOSPITAL | Age: 75
End: 2025-04-22
Attending: INTERNAL MEDICINE
Payer: MEDICARE

## 2025-04-22 DIAGNOSIS — I10 ESSENTIAL HYPERTENSION: ICD-10-CM

## 2025-04-22 DIAGNOSIS — Z12.5 SCREENING FOR PROSTATE CANCER: ICD-10-CM

## 2025-04-22 LAB
ALBUMIN SERPL-MCNC: 4.2 G/DL (ref 3.2–4.8)
ALBUMIN/GLOB SERPL: 1.7 {RATIO} (ref 1–2)
ALP LIVER SERPL-CCNC: 112 U/L (ref 45–117)
ALT SERPL-CCNC: <7 U/L (ref 10–49)
ANION GAP SERPL CALC-SCNC: 8 MMOL/L (ref 0–18)
AST SERPL-CCNC: 16 U/L (ref ?–34)
BASOPHILS # BLD AUTO: 0.04 X10(3) UL (ref 0–0.2)
BASOPHILS NFR BLD AUTO: 0.5 %
BILIRUB SERPL-MCNC: 0.9 MG/DL (ref 0.2–1.1)
BUN BLD-MCNC: 19 MG/DL (ref 9–23)
BUN/CREAT SERPL: 15.4 (ref 10–20)
CALCIUM BLD-MCNC: 9.6 MG/DL (ref 8.7–10.4)
CHLORIDE SERPL-SCNC: 101 MMOL/L (ref 98–112)
CHOLEST SERPL-MCNC: 124 MG/DL (ref ?–200)
CO2 SERPL-SCNC: 30 MMOL/L (ref 21–32)
COMPLEXED PSA SERPL-MCNC: 1.97 NG/ML (ref ?–4)
CREAT BLD-MCNC: 1.23 MG/DL (ref 0.7–1.3)
DEPRECATED RDW RBC AUTO: 42.3 FL (ref 35.1–46.3)
EGFRCR SERPLBLD CKD-EPI 2021: 60 ML/MIN/1.73M2 (ref 60–?)
EOSINOPHIL # BLD AUTO: 0.18 X10(3) UL (ref 0–0.7)
EOSINOPHIL NFR BLD AUTO: 2.3 %
ERYTHROCYTE [DISTWIDTH] IN BLOOD BY AUTOMATED COUNT: 12.1 % (ref 11–15)
FASTING PATIENT LIPID ANSWER: YES
FASTING STATUS PATIENT QL REPORTED: YES
GLOBULIN PLAS-MCNC: 2.5 G/DL (ref 2–3.5)
GLUCOSE BLD-MCNC: 91 MG/DL (ref 70–99)
HCT VFR BLD AUTO: 44.8 % (ref 39–53)
HDLC SERPL-MCNC: 40 MG/DL (ref 40–59)
HGB BLD-MCNC: 15.3 G/DL (ref 13–17.5)
IMM GRANULOCYTES # BLD AUTO: 0.02 X10(3) UL (ref 0–1)
IMM GRANULOCYTES NFR BLD: 0.3 %
LDLC SERPL CALC-MCNC: 65 MG/DL (ref ?–100)
LYMPHOCYTES # BLD AUTO: 2.08 X10(3) UL (ref 1–4)
LYMPHOCYTES NFR BLD AUTO: 26.1 %
MCH RBC QN AUTO: 32.2 PG (ref 26–34)
MCHC RBC AUTO-ENTMCNC: 34.2 G/DL (ref 31–37)
MCV RBC AUTO: 94.3 FL (ref 80–100)
MONOCYTES # BLD AUTO: 0.84 X10(3) UL (ref 0.1–1)
MONOCYTES NFR BLD AUTO: 10.6 %
NEUTROPHILS # BLD AUTO: 4.8 X10 (3) UL (ref 1.5–7.7)
NEUTROPHILS # BLD AUTO: 4.8 X10(3) UL (ref 1.5–7.7)
NEUTROPHILS NFR BLD AUTO: 60.2 %
NONHDLC SERPL-MCNC: 84 MG/DL (ref ?–130)
OSMOLALITY SERPL CALC.SUM OF ELEC: 290 MOSM/KG (ref 275–295)
PLATELET # BLD AUTO: 200 10(3)UL (ref 150–450)
POTASSIUM SERPL-SCNC: 3.6 MMOL/L (ref 3.5–5.1)
PROT SERPL-MCNC: 6.7 G/DL (ref 5.7–8.2)
RBC # BLD AUTO: 4.75 X10(6)UL (ref 3.8–5.8)
SODIUM SERPL-SCNC: 139 MMOL/L (ref 136–145)
TRIGL SERPL-MCNC: 103 MG/DL (ref 30–149)
TSI SER-ACNC: 1.76 UIU/ML (ref 0.55–4.78)
VIT B12 SERPL-MCNC: 300 PG/ML (ref 211–911)
VLDLC SERPL CALC-MCNC: 15 MG/DL (ref 0–30)
WBC # BLD AUTO: 8 X10(3) UL (ref 4–11)

## 2025-04-22 PROCEDURE — 84443 ASSAY THYROID STIM HORMONE: CPT

## 2025-04-22 PROCEDURE — 85025 COMPLETE CBC W/AUTO DIFF WBC: CPT

## 2025-04-22 PROCEDURE — 82607 VITAMIN B-12: CPT

## 2025-04-22 PROCEDURE — 80061 LIPID PANEL: CPT

## 2025-04-22 PROCEDURE — 80053 COMPREHEN METABOLIC PANEL: CPT

## 2025-04-22 PROCEDURE — 36415 COLL VENOUS BLD VENIPUNCTURE: CPT

## 2025-04-24 ENCOUNTER — HOSPITAL ENCOUNTER (OUTPATIENT)
Dept: ULTRASOUND IMAGING | Facility: HOSPITAL | Age: 75
Discharge: HOME | End: 2025-04-24
Attending: INTERNAL MEDICINE
Payer: MEDICARE

## 2025-04-24 DIAGNOSIS — M79.671 CHRONIC PAIN OF BOTH FEET: ICD-10-CM

## 2025-04-24 DIAGNOSIS — G89.29 CHRONIC PAIN OF BOTH FEET: ICD-10-CM

## 2025-04-24 DIAGNOSIS — M79.672 CHRONIC PAIN OF BOTH FEET: ICD-10-CM

## 2025-04-24 DIAGNOSIS — I25.10 ASHD (ARTERIOSCLEROTIC HEART DISEASE): ICD-10-CM

## 2025-04-24 PROCEDURE — 93923 UPR/LXTR ART STDY 3+ LVLS: CPT | Performed by: INTERNAL MEDICINE

## 2025-04-28 ENCOUNTER — OFFICE VISIT (OUTPATIENT)
Facility: CLINIC | Age: 75
End: 2025-04-28
Payer: MEDICARE

## 2025-04-28 VITALS
DIASTOLIC BLOOD PRESSURE: 74 MMHG | BODY MASS INDEX: 29.12 KG/M2 | SYSTOLIC BLOOD PRESSURE: 118 MMHG | WEIGHT: 208 LBS | HEIGHT: 71 IN

## 2025-04-28 DIAGNOSIS — L81.9 DISCOLORATION OF SKIN OF FOOT: ICD-10-CM

## 2025-04-28 DIAGNOSIS — I87.2 VENOUS INSUFFICIENCY: Primary | ICD-10-CM

## 2025-04-28 PROCEDURE — 1159F MED LIST DOCD IN RCRD: CPT | Performed by: STUDENT IN AN ORGANIZED HEALTH CARE EDUCATION/TRAINING PROGRAM

## 2025-04-28 PROCEDURE — 3078F DIAST BP <80 MM HG: CPT | Performed by: STUDENT IN AN ORGANIZED HEALTH CARE EDUCATION/TRAINING PROGRAM

## 2025-04-28 PROCEDURE — 99204 OFFICE O/P NEW MOD 45 MIN: CPT | Performed by: STUDENT IN AN ORGANIZED HEALTH CARE EDUCATION/TRAINING PROGRAM

## 2025-04-28 PROCEDURE — 3074F SYST BP LT 130 MM HG: CPT | Performed by: STUDENT IN AN ORGANIZED HEALTH CARE EDUCATION/TRAINING PROGRAM

## 2025-04-28 PROCEDURE — 1160F RVW MEDS BY RX/DR IN RCRD: CPT | Performed by: STUDENT IN AN ORGANIZED HEALTH CARE EDUCATION/TRAINING PROGRAM

## 2025-04-28 PROCEDURE — 3008F BODY MASS INDEX DOCD: CPT | Performed by: STUDENT IN AN ORGANIZED HEALTH CARE EDUCATION/TRAINING PROGRAM

## 2025-04-29 NOTE — PROGRESS NOTES
Marty Boswell MD.  Vascular and Endovascular Surgery        VASCULAR SURGERY CONSULT NOTE      Amaris Ocasio   :  1950  MR#  [id number]    REFERRING PHYSICIAN:  Bubba Nobles  PRIMARY CARE PHYSICIAN:  Bubba Nobles MD    I was requested by Mallorie to visit Amaris ROLON Ninfa for B foot discoloration    Chief Complaint   Patient presents with    Referral     Referral from Dr. Bubba Nobles for PAD. The patient has edema and discoloration in Bilat Lower extremities. The patient reports he has minimal pain.             HPI:    The patient is a 75 year old male who has been referred to the clinic today for an evaluation of his bilateral feet discoloration. He denies any heaviness, tiredness and pain after prolonged standing.   He does not have symptoms compatible with short distance claudication, ischemic rest pain or nonhealing wounds of bilateral lower extremity.  He has not worn compression stockings in the recent past. He has no history of deep venous thrombosis, episodes of thrombophlebitis, or bleeding from these veins.    PAST MEDICAL HISTORY:   Past Medical History[1]    PAST SURGICAL HISTORY:   Past Surgical History[2]     MEDICATIONS:   Current Medications[3]    ALLERGIES:   Allergies[4]    SOCIAL HISTORY:   Short Social Hx on File[5]     FAMILY HISTORY:   Family History[6]    ROS:   A 12 point review of systems with pertinent positives and negatives listed in the HPI.    PHYSICAL EXAM:   /74 (BP Location: Right arm, Patient Position: Sitting)   Ht 5' 11\" (1.803 m)   Wt 208 lb (94.3 kg)   BMI 29.01 kg/m²   GENERAL: alert and orientated X 3, well developed, well nourished, in no apparent   distress  HEENT: ears and throat are clear  NECK: supple, no lymphadenopathy, thyroid wnl  CAROTID: No bruits  RESPIRATORY: no rales, rhonchi, or wheezes B  CARDIO: RRR without murmur, no murmur, no gallop   ABDOMEN: soft, non-tender with no palpable aneurysm or masses  BACK: normal, no  tenderness  SKIN: no rashes, warm and dry  NEURO/PSYCH: orientated x3, normal mood and affect, no sensory or motor deficit  EXTREMITIES: full range of motion, no tenderness or edema in either leg.   VASCULAR  Pulse exam right: femoral 1+, DP  non-palpable, PT  non-palpable  Pulse exam left: femoral  1+, DP  non-palpable, PT  non-palpable      Vein Assessment:   Derm: there is no significant hemosiderin deposition or lipodermatosclerosis present in either leg.       IMPRESSION:   75 year old male who has been referred to the clinic today for an evaluation of his bilateral feet discoloration. He denies any heaviness, tiredness and pain after prolonged standing.   He does not have symptoms compatible with short distance claudication, ischemic rest pain or nonhealing wounds of bilateral lower extremity.    PLAN:     I explained to the patient that lack of leg heaviness, pain, short distance claudication or ischemic rest pain are all in favor of asymptomatic venous or arterial disease.  I am going to send the patient for venous duplex ultrasound of bilateral lower extremity to ensure that the skin discoloration is not related to venous reflux.  I also gave the patient a prescription for bilateral lower extremity compression stockings to see if patient's symptoms improve with some compression.  I am going to reach out to the patient with the venous duplex ultrasound results.  The patient understood and agreed to proceed with this treatment plan, all of his questions were answered during this clinic visit. He was asked to FU when he develops any new symptoms.      Thank you for allowing to participate in the care of your patients. Please do not hesitate to contact my office with any questions.    Sincerely,  Marty Boswell MD  [facility], Division of Vascular Surgery         [1]   Past Medical History:   ASHD (arteriosclerotic heart disease)    Acute inferolateral MI s/p stent ×3; Lexiscan GXT 3-23 with small  moderate intensity reversible lateral wall defect with EF 70%    Atherosclerosis of aorta    CXR 2-13    BPH (benign prostatic hyperplasia)    COPD (chronic obstructive pulmonary disease) ([facility])    CXR 1-22    Erectile dysfunction    Essential hypertension    History of colon polyps    Hypercholesterolemia    Ischemic cardiomyopathy    EF 45-50% on 9/2015; Echo 3-21 with mild inferolateral HK, EF 45%, mild MR, mild LAE    Lumbar spinal stenosis    MRI 8-18 with lumbar spinal stenosis moderate-severe L3-L4, moderate L2-L3, mild-moderate L4-L5 and mild L1-L2 and L5-S1    Osteoarthritis    Right L1-2 synovical cyst   [2]   Past Surgical History:  Procedure Laterality Date    Angioplasty (coronary)  12/29/2013    Colonoscopy N/A 08/03/2021    Procedure: COLONOSCOPY;  Surgeon: Morgan Rosales MD;  Location: [facility] ENDOSCOPY    Coronary stent placement  2013    Foot/toes surgery proc unlisted Right 1974    Traumatic amputation first and second toes    Inguinal hernia repair Right 06/21/2017   [3]   Current Outpatient Medications   Medication Sig Dispense Refill    hydroCHLOROthiazide 25 MG Oral Tab Take 1 tablet (25 mg total) by mouth every morning. 90 tablet 3    lisinopril 5 MG Oral Tab Take 1 tablet (5 mg total) by mouth daily.      atorvastatin 80 MG Oral Tab Take 1 tablet (80 mg total) by mouth every evening. 90 tablet 3    tamsulosin 0.4 MG Oral Cap Take 1 capsule (0.4 mg total) by mouth daily. TAKE 1/2 HOUR FOLLOWING THE SAME MEAL EACH DAY 90 capsule 3    temazepam 30 MG Oral Cap Take 1 capsule (30 mg total) by mouth nightly as needed for Sleep. 30 capsule 0    gabapentin 100 MG Oral Cap Take 2 capsules (200 mg total) by mouth 2 (two) times daily. 360 capsule 3    Potassium Chloride ER 20 MEQ Oral Tab CR Take 1 tablet by mouth daily. 90 tablet 1    clopidogrel 75 MG Oral Tab Take 1 tablet (75 mg total) by mouth daily. 90 tablet 3    aspirin 81 MG Oral Tab Take 1 tablet (81 mg total) by mouth daily.       acetaminophen 500 MG Oral Tab Take 1 tablet (500 mg total) by mouth every 6 (six) hours as needed for Pain.      Cholecalciferol (VITAMIN D) 1000 UNITS Oral Cap Take 1 capsule by mouth daily.       [4] No Known Allergies  [5]   Social History  Socioeconomic History    Marital status:     Number of children: 3   Occupational History    Occupation: [employer],    Tobacco Use    Smoking status: Former     Current packs/day: 0.00     Average packs/day: 1 pack/day for 10.0 years (10.0 ttl pk-yrs)     Types: Cigarettes     Start date: 2003     Quit date: 2013     Years since quittin.3    Smokeless tobacco: Never   Vaping Use    Vaping status: Never Used   Substance and Sexual Activity    Alcohol use: Yes     Alcohol/week: 0.0 standard drinks of alcohol     Comment: 1-2 beers weekly    Drug use: No   Other Topics Concern    Caffeine Concern Yes     Comment: Coffee, 4 cups daily;     Exercise No   Social History Narrative    The patient does not use an assistive device..      The patient does live in a home with stairs.     Social Drivers of Health     Food Insecurity: No Food Insecurity (2025)    NCSS - Food Insecurity     Worried About Running Out of Food in the Last Year: No     Ran Out of Food in the Last Year: No   Transportation Needs: No Transportation Needs (2025)    NCSS - Transportation     Lack of Transportation: No   Housing Stability: Not At Risk (2025)    NCSS - Housing/Utilities     Has Housing: Yes     Worried About Losing Housing: No     Unable to Get Utilities: No   [6]   Family History  Problem Relation Age of Onset    Other (COPD [Other]) Father     Other (Brain Cancer [Other]) Mother     Cancer Mother

## 2025-05-10 DIAGNOSIS — F51.04 CHRONIC INSOMNIA: ICD-10-CM

## 2025-05-12 RX ORDER — TEMAZEPAM 30 MG/1
30 CAPSULE ORAL NIGHTLY PRN
Qty: 30 CAPSULE | Refills: 0 | Status: SHIPPED | OUTPATIENT
Start: 2025-05-13

## 2025-05-12 RX ORDER — TEMAZEPAM 30 MG/1
30 CAPSULE ORAL NIGHTLY PRN
Qty: 30 CAPSULE | Refills: 0 | OUTPATIENT
Start: 2025-05-13

## 2025-05-12 NOTE — TELEPHONE ENCOUNTER
Please review. Refill failed protocol.     Recent fills each # 30 : 4/14/25 , 3/13/25 , 2/10/25 , 1/11/25  Last prescription written: 4/14/25  Last office visit:  4/21/2025    Future Appointments   Date Time Provider Department Center   5/21/2025  2:00 PM [facility] VASCULAR SURGERY LAB [facility]   10/22/2025  1:00 PM [facility]      Sent 66. comt message to patient the refill request was forwarded to provider.

## 2025-05-13 DIAGNOSIS — F51.04 CHRONIC INSOMNIA: ICD-10-CM

## 2025-05-13 RX ORDER — TEMAZEPAM 30 MG/1
30 CAPSULE ORAL NIGHTLY PRN
Qty: 30 CAPSULE | Refills: 0 | OUTPATIENT
Start: 2025-05-13

## 2025-05-13 NOTE — TELEPHONE ENCOUNTER
temazepam 30 MG Oral Cap 30 capsule 0 5/13/2025 --    Sig - Route: Take 1 capsule (30 mg total) by mouth nightly as needed for Sleep. - Oral    Sent to pharmacy as: Temazepam 30 MG Oral Capsule (Restoril)    E-Prescribing Status: Receipt confirmed by pharmacy (5/12/2025  1:20 PM CDT)      Associated Diagnoses    Chronic insomnia        Pharmacy    [id number] - [address] [phone], [phone]

## 2025-05-21 ENCOUNTER — TELEPHONE (OUTPATIENT)
Facility: CLINIC | Age: 75
End: 2025-05-21

## 2025-05-21 ENCOUNTER — NURSE ONLY (OUTPATIENT)
Facility: CLINIC | Age: 75
End: 2025-05-21
Payer: MEDICARE

## 2025-05-21 DIAGNOSIS — I70.209 SUPERFICIAL FEMORAL ARTERY OCCLUSION: ICD-10-CM

## 2025-05-21 DIAGNOSIS — I87.2 VENOUS INSUFFICIENCY: ICD-10-CM

## 2025-05-21 PROCEDURE — 93970 EXTREMITY STUDY: CPT | Performed by: STUDENT IN AN ORGANIZED HEALTH CARE EDUCATION/TRAINING PROGRAM

## 2025-05-21 PROCEDURE — 93922 UPR/L XTREMITY ART 2 LEVELS: CPT | Performed by: STUDENT IN AN ORGANIZED HEALTH CARE EDUCATION/TRAINING PROGRAM

## 2025-05-21 NOTE — TELEPHONE ENCOUNTER
Per [facility] prior auth list. CPT CODE 04770/49668 not on list for prior auth

## 2025-05-24 NOTE — TELEPHONE ENCOUNTER
Current Outpatient Medications:     Potassium Chloride ER 20 MEQ Oral Tab CR, Take 1 tablet by mouth daily., Disp: 90 tablet, Rfl: 1

## 2025-05-27 RX ORDER — POTASSIUM CHLORIDE 1500 MG/1
1 TABLET, EXTENDED RELEASE ORAL DAILY
Qty: 90 TABLET | Refills: 1 | OUTPATIENT
Start: 2025-05-27

## 2025-05-27 RX ORDER — POTASSIUM CHLORIDE 1500 MG/1
1 TABLET, EXTENDED RELEASE ORAL DAILY
Qty: 90 TABLET | Refills: 0 | OUTPATIENT
Start: 2025-05-27

## 2025-05-28 ENCOUNTER — TELEPHONE (OUTPATIENT)
Facility: CLINIC | Age: 75
End: 2025-05-28

## 2025-05-28 RX ORDER — POTASSIUM CHLORIDE 1500 MG/1
1 TABLET, EXTENDED RELEASE ORAL DAILY
Qty: 90 TABLET | Refills: 1 | Status: SHIPPED | OUTPATIENT
Start: 2025-05-28

## 2025-05-28 RX ORDER — POTASSIUM CHLORIDE 1500 MG/1
1 TABLET, EXTENDED RELEASE ORAL DAILY
Qty: 90 TABLET | Refills: 1 | OUTPATIENT
Start: 2025-05-28

## 2025-05-28 NOTE — TELEPHONE ENCOUNTER
Potassium Chloride ER 20 MEQ Oral Tab CR 90 tablet 1 5/28/2025 --    Sig - Route: Take 1 tablet by mouth daily. - Oral    Sent to pharmacy as: Potassium Chloride ER 20 MEQ Oral Tablet Extended Release    E-Prescribing Status: Receipt confirmed by pharmacy (5/28/2025 10:00 AM CDT)      Pharmacy    [id number] - [place], [place] - [address] [place] [address] [phone], [phone]

## 2025-05-28 NOTE — TELEPHONE ENCOUNTER
Per [facility] prior auth list. CPT CODE 42905 [id number] not on list for prior auth

## 2025-06-08 DIAGNOSIS — F51.04 CHRONIC INSOMNIA: ICD-10-CM

## 2025-06-08 RX ORDER — TEMAZEPAM 30 MG/1
30 CAPSULE ORAL NIGHTLY PRN
Qty: 30 CAPSULE | Refills: 0 | Status: CANCELLED | OUTPATIENT
Start: 2025-06-08

## 2025-06-09 DIAGNOSIS — F51.04 CHRONIC INSOMNIA: ICD-10-CM

## 2025-06-09 RX ORDER — TEMAZEPAM 30 MG/1
30 CAPSULE ORAL NIGHTLY PRN
Qty: 30 CAPSULE | Refills: 0 | Status: SHIPPED | OUTPATIENT
Start: 2025-06-09

## 2025-06-09 NOTE — TELEPHONE ENCOUNTER
Please review.  Protocol Failed.    Recent fills each # 30 : 3/13, 4/14, 5/13  Due 6/13/25  Last prescription written: 5/12/25  Last office visit: 4/21/2025    Future Appointments   Date Time Provider Department Center   8/28/2025  1:00 PM [facility]ASCULAR SURGERY LAB [facility]   10/22/2025  1:00 PM [facility]         Requested Prescriptions   Pending Prescriptions Disp Refills    TEMAZEPAM 30 MG Oral Cap [Pharmacy Med Name: Temazepam 30 Mg Cap Spec] 30 capsule 0     Sig: Take 1 capsule by mouth nightly as needed for Sleep.       Controlled Substance Medication Failed - 6/9/2025 11:53 AM        Failed - This medication is a controlled substance - forward to provider to refill        Passed - Medication is active on med list

## 2025-06-09 NOTE — TELEPHONE ENCOUNTER
Please review.  Protocol failed/has no protocol.    Recent fills each # 30 : 05/13/2025,04/14/2025  Last prescription written: 05/13/2025  Last office visit:  4/21/2025    Future Appointments   Date Time Provider Department Center          10/22/2025  1:00 PM Bubba Nobles MD [facility]

## 2025-06-10 RX ORDER — TEMAZEPAM 30 MG/1
30 CAPSULE ORAL NIGHTLY PRN
Qty: 30 CAPSULE | Refills: 0 | OUTPATIENT
Start: 2025-06-10

## 2025-06-10 NOTE — TELEPHONE ENCOUNTER
Temazepam 30 mg : 30 caps was sent on 06/09/2025 to [place] .          Outpatient Medication Detail     Disp Refills Start End    temazepam 30 MG Oral Cap 30 capsule 0 6/9/2025 --    Sig - Route: Take 1 capsule (30 mg total) by mouth nightly as needed for Sleep. - Oral    Sent to pharmacy as: Temazepam 30 MG Oral Capsule (Restoril)    E-Prescribing Status: Receipt confirmed by pharmacy (6/9/2025  1:57 PM CDT)      Associated Diagnoses    Chronic insomnia        Pharmacy    [id number] - [place], [place] - [address] [place] [address] [phone], [phone]

## 2025-07-07 DIAGNOSIS — F51.04 CHRONIC INSOMNIA: ICD-10-CM

## 2025-07-08 DIAGNOSIS — F51.04 CHRONIC INSOMNIA: ICD-10-CM

## 2025-07-08 RX ORDER — TEMAZEPAM 30 MG/1
30 CAPSULE ORAL NIGHTLY PRN
Qty: 30 CAPSULE | Refills: 0 | OUTPATIENT
Start: 2025-07-08

## 2025-07-08 NOTE — TELEPHONE ENCOUNTER
Please review. Protocol Failed; No Protocol      Recent fills: 5/13/2025, 6/9/2025  Last Rx written: 6/9/2025  Last office visit: 4/21/2025    Future Appointments  Date Type Provider Dept   10/22/25 Appointment Bubba Nobles MD [facility]   Showing future appointments within next 150 days with a meds authorizing provider and meeting all other requirements

## 2025-07-09 RX ORDER — TEMAZEPAM 30 MG/1
30 CAPSULE ORAL NIGHTLY PRN
Qty: 30 CAPSULE | Refills: 0 | OUTPATIENT
Start: 2025-07-09

## 2025-07-09 RX ORDER — TEMAZEPAM 30 MG/1
30 CAPSULE ORAL NIGHTLY PRN
Qty: 30 CAPSULE | Refills: 0 | Status: SHIPPED | OUTPATIENT
Start: 2025-07-09

## 2025-07-09 NOTE — TELEPHONE ENCOUNTER
temazepam 30 MG Oral Cap 30 capsule 0 7/9/2025 --    Sig - Route: Take 1 capsule (30 mg total) by mouth nightly as needed for Sleep. - Oral    Sent to pharmacy as: Temazepam 30 MG Oral Capsule (Restoril)    E-Prescribing Status: Receipt confirmed by pharmacy (7/9/2025  8:31 AM CDT)      Associated Diagnoses    Chronic insomnia        Pharmacy    [id number] - [address] [phone], [phone]

## 2025-08-04 DIAGNOSIS — F51.04 CHRONIC INSOMNIA: ICD-10-CM

## 2025-08-04 RX ORDER — TEMAZEPAM 30 MG/1
30 CAPSULE ORAL NIGHTLY PRN
Qty: 30 CAPSULE | Refills: 0 | Status: CANCELLED | OUTPATIENT
Start: 2025-08-04

## 2025-08-06 RX ORDER — TEMAZEPAM 30 MG/1
30 CAPSULE ORAL NIGHTLY PRN
Qty: 30 CAPSULE | Refills: 0 | Status: SHIPPED | OUTPATIENT
Start: 2025-08-06

## (undated) DEVICE — GOWN SURG AERO BLUE PERF LG

## (undated) DEVICE — E-Z CLEAN, NON-STICK, PTFE COATED, ELECTROSURGICAL BLADE ELECTRODE, MODIFIED EXTENDED INSULATION, 2.5 INCH (6.35 CM): Brand: MEGADYNE

## (undated) DEVICE — CLIPPER BLADE 3M

## (undated) DEVICE — VIOLET BRAIDED (POLYGLACTIN 910), SYNTHETIC ABSORBABLE SUTURE: Brand: COATED VICRYL

## (undated) DEVICE — Device: Brand: DEFENDO AIR/WATER/SUCTION AND BIOPSY VALVE

## (undated) DEVICE — SPONGE: SPECIALTY PEANUT XR 100/CS: Brand: MEDICAL ACTION INDUSTRIES

## (undated) DEVICE — TRAY SRGPRP PVP IOD WT SCRB SM

## (undated) DEVICE — CHLORAPREP 26ML APPLICATOR

## (undated) DEVICE — 35 ML SYRINGE REGULAR TIP: Brand: MONOJECT

## (undated) DEVICE — UNDYED BRAIDED (POLYGLACTIN 910), SYNTHETIC ABSORBABLE SUTURE: Brand: COATED VICRYL

## (undated) DEVICE — DRAIN INCS 3/8IN 12IN PNRS RBR

## (undated) DEVICE — DERMABOND LIQUID ADHESIVE

## (undated) DEVICE — SOL  .9 1000ML BTL

## (undated) DEVICE — MINOR GENERAL: Brand: MEDLINE INDUSTRIES, INC.

## (undated) DEVICE — Device: Brand: CUSTOM PROCEDURE KIT

## (undated) DEVICE — LINE MNTR ADLT SET O2 INTMD

## (undated) DEVICE — KENDALL SCD EXPRESS SLEEVES, KNEE LENGTH, MEDIUM: Brand: KENDALL SCD

## (undated) DEVICE — MEDI-VAC NON-CONDUCTIVE SUCTION TUBING 6MM X 1.8M (6FT.) L: Brand: CARDINAL HEALTH

## (undated) DEVICE — DRAPE SHEET TRANSVERSE LAP

## (undated) DEVICE — COVER SGL STRL LGHT HNDL BLU

## (undated) DEVICE — SNARE OPTMZ PLPCTM TRP

## (undated) DEVICE — SNARE CAPTIFLEX MICRO-OVL OLY

## (undated) DEVICE — SUCTION CANISTER, 3000CC,SAFELINER: Brand: DEROYAL

## (undated) DEVICE — REM POLYHESIVE ADULT PATIENT RETURN ELECTRODE: Brand: VALLEYLAB

## (undated) DEVICE — STERILE LATEX POWDER-FREE SURGICAL GLOVESWITH NITRILE COATING: Brand: PROTEXIS

## (undated) NOTE — MR AVS SNAPSHOT
[facility]  [address]  [phone]               Thank you for choosing us for your health care visit with Dandy Becerra MD.  We are glad to serve you and happy to provide you with this summary Commonly known as:  PRINIVIL,ZESTRIL           Metoprolol Succinate ER 25 MG Tb24   Take 25 mg by mouth daily. Commonly known as:   Toprol XL           temazepam 30 MG Caps   TAKE ONE CAPSULE BY MOUTH EVERY NIGHT AT BEDTIME AS NEEDED FOR INSOMNIA   Common HOW TO GET STARTED: HOW TO STAY MOTIVATED:   Start activities slowly and build up over time Do what you like   Get your heart pumping – brisk walking, biking, swimming Even 10 minute increments are effective and add up over the week   2 ½ hours per week –

## (undated) NOTE — LETTER
No referring provider defined for this encounter. 07/06/17        Patient: Claretta Dose   YOB: 1950   Date of Visit: 7/6/2017       Dear  Dr. Lorie Cortes MD,      Thank you for referring Claretta Dose to my practice.   P

## (undated) NOTE — LETTER
[address], [address], [address] [facility]  [address] [facility], [place] [address]  [phone]            August 14, 2018    The purpose of this Agreement is to prevent misunderstandings about certain medication character and intensity of my pain, the effect of the pain on my daily life, and how well medicine is helping to relieve pain.    _______ I will not use any illegal controlled substances, including marijuana, cocaine, etc., nor will I misuse or self-prescr Date                 Time                 Signature of Witness

## (undated) NOTE — LETTER
[address], [place]  Authorization for Invasive Procedures  1.  I hereby authorize Dr. Arnaldo Gardner , my physician and whomever may be designated as the doctor's assistant, to perform the following operation and/or procedure:  Col fever and allergic reactions, hemolytic reactions, transmission of disease such as hepatitis, AIDS, cytomegalovirus (CMV), and flluid overload.  In the event that I wish to have autologous transfusions of my own blood, or a directed donor transfusion, I ch[place] Signature of Patient:  ________________________________________________ Date: _________Time: _________    Responsible person in case of minor or unconscious: _____________________________Relationship: ____________     Witness Signature: ___________________

## (undated) NOTE — IP AVS SNAPSHOT
[facility]    [address], [place], [place] ~ [phone]                Discharge Summary   6/21/2017    [address]           Admission Information        Provider Department    6/21/2017 Dandy Becerra MD [facility] P · Do not drive any motor vehicle or bicycle   · Avoid mowing the lawn, playing sports, or working with power tools/applicances (power saws, electric knives or mixers)   · That you have someone stay with you on your first night home   · Do not drink alcohol Rose Blank and your surgeon are proudly participating in the [employer] \"National Surgical quality Improvement Program\" (NSQIP).   Questionnaires will be mailed to randomly selected surgery patients 30 days after their surger coverage. Patient [address] is a Federal Navigator program that can help with your Affordable Care Act coverage, as well as all types of Medicaid plans.   To get signed up and covered, please call [phone] and ask to get set up for an insuran

## (undated) NOTE — LETTER
AUTHORIZATION FOR SURGICAL OPERATION OR OTHER PROCEDURE    1.  I hereby authorize Dr. Lory Lefort and the [facility] staff assigned to my case to perform the following operation and/or procedure at the [facility]:    Right lumbar paraspinal trigger point inject Time:  ________ A. M.  P.M.        Patient Name:Amaris Fairchild  [id number]  2/7/1950         Patient signature:  ___________________________________________________             Relationship to Patient:           []  Parent    Responsib

## (undated) NOTE — LETTER
7/27/2017          To Whom It May Concern:    Zoraiad Sandoval is currently under my medical care. Pt. To continue with light duty, no lifting over 10 lbs. , no strenuous activity until 8-14-17.     If you require additional information please contact

## (undated) NOTE — Clinical Note
No referring provider defined for this encounter. 05/08/2017        Patient: Lynda Clark   YOB: 1950   Date of Visit: 5/8/2017       Dear  Dr. Yvette Clark MD,      Thank you for referring Lynda Clark to my practice.

## (undated) NOTE — LETTER
[facility] SURGERY SCHEDULING FORM   [address]   [phone] (scheduling phone) [phone] (scheduling fax)     PATIENT INFORMATION   Last Name:      Vianney Anglin      First Name:    Tyesha []  No or using our own   Allergies: Patient has no known allergies.          Completed by:    Latoya Ley      Date:    8/27/2018

## (undated) NOTE — LETTER
Medical Grade Compression Hose     Patient: Amaris ROLON Przybylinski     YOB: 1950         Diagnosis: Limb Swelling: M79.89  Compression: 20-30mmHg- Moderate  Style: Knee-High        Amount: X 2  HCPS: - Knee High          Physician Signature: _____________________  Date: 4/28/2025     Marty Boswell MD

## (undated) NOTE — LETTER
[facility]  Administration of Anesthesia  1.  Bryan Villalba, or _________________________________ acting on his behalf, (Patient) (Dependent/Representative) request to receive anesthesia for my pending procedure/operation/treatme spinal bleeding, seizure, cardiac arrest and death. 7. AWARENESS: I understand that it is possible (but unlikely) to have explicit memory of events from the operating room while under general anesthesia.   8. ELECTROCONVULSIVE THERAPY PATIENTS: This consen signature below affirms that prior to the time of the procedure, I have explained to the patient and/or his/her guardian, the risks and benefits of undergoing anesthesia, as well as any reasonable alternatives.     __________________________________________

## (undated) NOTE — LETTER
7/6/2017          To Whom It May Concern:    Ramya Cornelius is currently under my medical care and may return to work  July 17th light duty:  No lifting over 10 lbs and no strenuous activity.   She may return to full duty effective July 31st.    If y

## (undated) NOTE — LETTER
[facility] SURGERY SCHEDULING FORM   [address] [facility]   [phone] (scheduling phone) [phone] (scheduling fax)     PATIENT INFORMATION   Last Name:      Jason Holcomb      First Name:    Mohamud Mendoza [x]  Yes  []  No or using our own   Allergies: Patient has no known allergies.          Completed by:   Mei Adair      Date:   10/17/2018

## (undated) NOTE — Clinical Note
6/3/2017              Rachel Sanchez Przybylinski        [address]         To Whom It May Concern,    Hernan Vinicio is currently under my medical care. Reyes Khris needs to be on light duty and cannot lift over 10 lbs.   I

## (undated) NOTE — LETTER
8/1/2017          To Whom It May Concern:    Saurabh Cruz is currently under my medical care and may return to work on 08/01/2017.     Activity is restricted as follows: Light duty including no heavy lifting over 25 pounds and no strenuous activity

## (undated) NOTE — ED AVS SNAPSHOT
Ej Alba   MRN: [id number]    Department:  [facility] Emergency Department   Date of Visit:  10/4/2017           Disclosure     Insurance plans vary and the physician(s) referred by the ER may not be covered by your plan.  Please co CARE PHYSICIAN AT ONCE OR RETURN IMMEDIATELY TO THE EMERGENCY DEPARTMENT. If you have been prescribed any medication(s), please fill your prescription right away and begin taking the medication(s) as directed.   If you believe that any of the medications

## (undated) NOTE — LETTER
Katey Rhoades, [address]       10/20/21        Patient: Ramya Cornelius   YOB: 1950   Date of Visit: 10/20/2021       Dear  Dr. Yanni Baugh MD,      Thank you for referring Ramya Cornelius 3.5 x 5 cm, moderate. We discussed scrotal US vs observation.  I fully explained to patient the benefits, risks, and alternatives to this treatment option and I answered patient's questions; patient decides to undergo Scrotum US.     (R39.14) Feeling of inc problem.     3.   Please schedule testicular/scrotal ultrasound--reevaluate the testicles on the spermatoceles on each side.     4.  Please schedule bladder ultrasound     Both ultrasound tests are scheduled by calling [phone].     5.   Return visit wi

## (undated) NOTE — LETTER
10/10/2017              Ruben Holguin         To Whom It May Concern,     Ej Alba saw me today in my office for evaluation of an acute episode of low back pain.   Until further n